# Patient Record
Sex: FEMALE | Race: WHITE | Employment: OTHER | URBAN - METROPOLITAN AREA
[De-identification: names, ages, dates, MRNs, and addresses within clinical notes are randomized per-mention and may not be internally consistent; named-entity substitution may affect disease eponyms.]

---

## 2018-07-09 ENCOUNTER — HOSPITAL ENCOUNTER (EMERGENCY)
Age: 33
Discharge: HOME | End: 2018-07-09
Attending: EMERGENCY MEDICINE
Payer: COMMERCIAL

## 2018-07-09 VITALS
RESPIRATION RATE: 16 BRPM | SYSTOLIC BLOOD PRESSURE: 121 MMHG | OXYGEN SATURATION: 100 % | TEMPERATURE: 98.7 F | DIASTOLIC BLOOD PRESSURE: 76 MMHG | HEART RATE: 74 BPM

## 2018-07-09 DIAGNOSIS — F41.9 ANXIETY: Primary | ICD-10-CM

## 2018-07-09 PROCEDURE — 93005 ELECTROCARDIOGRAM TRACING: CPT

## 2018-07-09 PROCEDURE — 74011250637 HC RX REV CODE- 250/637: Performed by: EMERGENCY MEDICINE

## 2018-07-09 PROCEDURE — 99283 EMERGENCY DEPT VISIT LOW MDM: CPT

## 2018-07-09 RX ORDER — QUETIAPINE FUMARATE 300 MG/1
600 TABLET, FILM COATED ORAL
COMMUNITY
End: 2018-09-28 | Stop reason: SDUPTHER

## 2018-07-09 RX ORDER — DIAZEPAM 5 MG/1
10 TABLET ORAL
Status: COMPLETED | OUTPATIENT
Start: 2018-07-09 | End: 2018-07-09

## 2018-07-09 RX ORDER — MIRTAZAPINE 45 MG/1
45 TABLET, FILM COATED ORAL
COMMUNITY
End: 2018-09-03

## 2018-07-09 RX ORDER — SERTRALINE HYDROCHLORIDE 100 MG/1
200 TABLET, FILM COATED ORAL DAILY
Qty: 20 TAB | Refills: 0 | Status: SHIPPED | OUTPATIENT
Start: 2018-07-09 | End: 2018-07-19

## 2018-07-09 RX ORDER — ATENOLOL 25 MG/1
25 TABLET ORAL DAILY
COMMUNITY
End: 2018-09-03

## 2018-07-09 RX ORDER — SERTRALINE HYDROCHLORIDE 100 MG/1
100 TABLET, FILM COATED ORAL DAILY
COMMUNITY
End: 2018-07-09

## 2018-07-09 RX ADMIN — DIAZEPAM 10 MG: 5 TABLET ORAL at 18:34

## 2018-07-09 NOTE — ED TRIAGE NOTES
Situational anxiety   Moved here from [facility] 6 weeks ago. Sober for 9 months from meth. Coming up on mother's death.     Needs some anxiety medication

## 2018-07-09 NOTE — DISCHARGE INSTRUCTIONS
Anxiety Disorder: Care Instructions  Your Care Instructions    Anxiety is a normal reaction to stress. Difficult situations can cause you to have symptoms such as sweaty palms and a nervous feeling. In an anxiety disorder, the symptoms are far more severe. Constant worry, muscle tension, trouble sleeping, nausea and diarrhea, and other symptoms can make normal daily activities difficult or impossible. These symptoms may occur for no reason, and they can affect your work, school, or social life. Medicines, counseling, and self-care can all help. Follow-up care is a key part of your treatment and safety. Be sure to make and go to all appointments, and call your doctor if you are having problems. It's also a good idea to know your test results and keep a list of the medicines you take. How can you care for yourself at home? · Take medicines exactly as directed. Call your doctor if you think you are having a problem with your medicine. · Go to your counseling sessions and follow-up appointments. · Recognize and accept your anxiety. Then, when you are in a situation that makes you anxious, say to yourself, \"This is not an emergency. I feel uncomfortable, but I am not in danger. I can keep going even if I feel anxious. \"  · Be kind to your body:  ¨ Relieve tension with exercise or a massage. ¨ Get enough rest.  ¨ Avoid alcohol, caffeine, nicotine, and illegal drugs. They can increase your anxiety level and cause sleep problems. ¨ Learn and do relaxation techniques. See below for more about these techniques. · Engage your mind. Get out and do something you enjoy. Go to a funny movie, or take a walk or hike. Plan your day. Having too much or too little to do can make you anxious. · Keep a record of your symptoms. Discuss your fears with a good friend or family member, or join a support group for people with similar problems. Talking to others sometimes relieves stress.   · Get involved in social groups, or volunteer to help others. Being alone sometimes makes things seem worse than they are. · Get at least 30 minutes of exercise on most days of the week to relieve stress. Walking is a good choice. You also may want to do other activities, such as running, swimming, cycling, or playing tennis or team sports. Relaxation techniques  Do relaxation exercises 10 to 20 minutes a day. You can play soothing, relaxing music while you do them, if you wish. · Tell others in your house that you are going to do your relaxation exercises. Ask them not to disturb you. · Find a comfortable place, away from all distractions and noise. · Lie down on your back, or sit with your back straight. · Focus on your breathing. Make it slow and steady. · Breathe in through your nose. Breathe out through either your nose or mouth. · Breathe deeply, filling up the area between your navel and your rib cage. Breathe so that your belly goes up and down. · Do not hold your breath. · Breathe like this for 5 to 10 minutes. Notice the feeling of calmness throughout your whole body. As you continue to breathe slowly and deeply, relax by doing the following for another 5 to 10 minutes:  · Tighten and relax each muscle group in your body. You can begin at your toes and work your way up to your head. · Imagine your muscle groups relaxing and becoming heavy. · Empty your mind of all thoughts. · Let yourself relax more and more deeply. · Become aware of the state of calmness that surrounds you. · When your relaxation time is over, you can bring yourself back to alertness by moving your fingers and toes and then your hands and feet and then stretching and moving your entire body. Sometimes people fall asleep during relaxation, but they usually wake up shortly afterward. · Always give yourself time to return to full alertness before you drive a car or do anything that might cause an accident if you are not fully alert.  Never play a relaxation tape while you drive a car. When should you call for help? Call 911 anytime you think you may need emergency care. For example, call if:  ? · You feel you cannot stop from hurting yourself or someone else. ? Keep the numbers for these national suicide hotlines: 0-703-971-TALK (3-100.942.2728) and 5-053-XNMBJGJ (1-994.528.6388). If you or someone you know talks about suicide or feeling hopeless, get help right away. ? Watch closely for changes in your health, and be sure to contact your doctor if:  ? · You have anxiety or fear that affects your life. ? · You have symptoms of anxiety that are new or different from those you had before. Where can you learn more? Go to http://ascencion-kenzie.info/. Enter P754 in the search box to learn more about \"Anxiety Disorder: Care Instructions. \"  Current as of: May 12, 2017  Content Version: 11.4  © 6075-6637 [employer]. Care instructions adapted under license by [employer] (which disclaims liability or warranty for this information). If you have questions about a medical condition or this instruction, always ask your healthcare professional. [address] any warranty or liability for your use of this information.

## 2018-07-09 NOTE — ED PROVIDER NOTES
EMERGENCY DEPARTMENT HISTORY AND PHYSICAL EXAM    Date: 7/9/2018  Patient Name: Malik Willis    History of Presenting Illness     Chief Complaint   Patient presents with    Anxiety         History Provided By: Patient    Chief Complaint: anxiety  Duration: 2 Days  Timing:  Acute  Location: NA  Quality: anxousness  Severity: Moderate  Modifying Factors: h/o schizophrenia, bipolar and PTSD; mother's death's anniversary this month  Associated Symptoms: denies any other associated signs or symptoms      Additional History (Context): Malik Willis is a 27 y.o. female with anxiety, schizophrenia, BIPOLAR, meth addiction who presents with anxiousness with upcoming mother's death's anniversary. Says she moved here from [place] 6 weeks ago and has filed paperwork with [employer] today to get coverage. On seroquel, zoloft, metoprolol. Says she's been narcotic and benzo free x 3yrs. Prior hysterectomy. Moved here b/c of  and his family. PCP: None    Current Facility-Administered Medications   Medication Dose Route Frequency Provider Last Rate Last Dose    diazePAM (VALIUM) tablet 10 mg  10 mg Oral NOW DIMAS Gallegos         Current Outpatient Prescriptions   Medication Sig Dispense Refill    QUEtiapine (SEROQUEL) 300 mg tablet Take 600 mg by mouth nightly.  atenolol (TENORMIN) 25 mg tablet Take 25 mg by mouth daily.  mirtazapine (REMERON) 45 mg tablet Take 45 mg by mouth nightly.  sertraline (ZOLOFT) 100 mg tablet Take 2 Tabs by mouth daily for 10 days. Indications: Post Traumatic Stress Disorder 20 Tab 0       Past History     Past Medical History:  Past Medical History:   Diagnosis Date    Hep C w/o coma, chronic (HCC)     Schizo[facility]affective psychosis ([facility] [address]     Tachycardia        Past Surgical History:  No past surgical history on file. Family History:  No family history on file.     Social History:  Social History   Substance Use Topics    Smoking status: Not on file    Smokeless tobacco: Not on file    Alcohol use Not on file       Allergies: Allergies   Allergen Reactions    Gabapentin Anaphylaxis    Amoxicillin Hives    Vistaril [Hydroxyzine Pamoate] Unknown (comments)         Review of Systems   Review of Systems   Constitutional: Negative for fever. Psychiatric/Behavioral: Negative for agitation, behavioral problems, confusion, decreased concentration, dysphoric mood, hallucinations, self-injury, sleep disturbance and suicidal ideas. The patient is nervous/anxious. The patient is not hyperactive. All Other Systems Negative  Physical Exam     Vitals:    07/09/18 1743   BP: 121/76   Pulse: 74   Resp: 16   Temp: 98.7 °F (37.1 °C)   SpO2: 100%     Physical Exam   Constitutional: She is oriented to person, place, and time. She appears well-developed. HENT:   Head: Normocephalic and atraumatic. Eyes: Pupils are equal, round, and reactive to light. Neck: No JVD present. No tracheal deviation present. No thyromegaly present. Cardiovascular: Normal rate, regular rhythm and normal heart sounds. Exam reveals no gallop and no friction rub. No murmur heard. Pulmonary/Chest: Effort normal and breath sounds normal. No stridor. No respiratory distress. She has no wheezes. She has no rales. She exhibits no tenderness. Abdominal: Soft. She exhibits no distension and no mass. There is no tenderness. There is no rebound and no guarding. Musculoskeletal: She exhibits no edema or tenderness. Lymphadenopathy:     She has no cervical adenopathy. Neurological: She is alert and oriented to person, place, and time. Skin: Skin is warm and dry. No rash noted. No erythema. No pallor. Psychiatric: She has a normal mood and affect. Her behavior is normal. Thought content normal.   Anxious affect   Nursing note and vitals reviewed.              Diagnostic Study Results     Labs -     Recent Results (from the past 12 hour(s))   EKG, 12 LEAD, INITIAL    Collection Time: 07/09/18  5:28 PM   Result Value Ref Range    Ventricular Rate 91 BPM    Atrial Rate 91 BPM    P-R Interval 142 ms    QRS Duration 76 ms    Q-T Interval 350 ms    QTC Calculation (Bezet) 430 ms    Calculated P Axis 67 degrees    Calculated R Axis 69 degrees    Calculated T Axis 9 degrees    Diagnosis       Normal sinus rhythm  ST abnormality, possible digitalis effect  Abnormal ECG  No previous ECGs available         Radiologic Studies -   No orders to display     CT Results  (Last 48 hours)    None        CXR Results  (Last 48 hours)    None            Medical Decision Making   I am the first provider for this patient. I reviewed the vital signs, available nursing notes, past medical history, past surgical history, family history and social history. Vital Signs-Reviewed the patient's vital signs. Records Reviewed: Nursing Notes and     Procedures:  Procedures    Provider Notes (Medical Decision Making):  performed; pt does have two Rx from [place] this year for narcotics, <25tabs. Give dose of valium PO once here then increase her Zoloft to 200mg daily to help w/anxiety. F/up with psych/addiciton specialist and . Not SI or HI. MED RECONCILIATION:  Current Facility-Administered Medications   Medication Dose Route Frequency    diazePAM (VALIUM) tablet 10 mg  10 mg Oral NOW     Current Outpatient Prescriptions   Medication Sig    QUEtiapine (SEROQUEL) 300 mg tablet Take 600 mg by mouth nightly.  atenolol (TENORMIN) 25 mg tablet Take 25 mg by mouth daily.  mirtazapine (REMERON) 45 mg tablet Take 45 mg by mouth nightly.  sertraline (ZOLOFT) 100 mg tablet Take 2 Tabs by mouth daily for 10 days. Indications: Post Traumatic Stress Disorder       Disposition:  home    DISCHARGE NOTE:   6:28 PM    Pt has been reexamined. Patient has no new complaints, changes, or physical findings. Care plan outlined and precautions discussed. Results of exam were reviewed with the patient.  All medications were reviewed with the patient; will d/c home with zolof increase. All of pt's questions and concerns were addressed. Patient was instructed and agrees to follow up with psych, as well as to return to the ED upon further deterioration. Patient is ready to go home. Follow-up Information     Follow up With Details Comments [facility] Call in 1 day please discuss your follow up care arrangements [address] [facility] [address] [place] [id number]    Jhoan Calvo MD Schedule an appointment as soon as possible for a visit in 1 day  [facility] [address] EMERGENCY DEPT  If symptoms worsen return immediately [address]  [phone]          Current Discharge Medication List      CONTINUE these medications which have CHANGED    Details   sertraline (ZOLOFT) 100 mg tablet Take 2 Tabs by mouth daily for 10 days. Indications: Post Traumatic Stress Disorder  Qty: 20 Tab, Refills: 0    Associated Diagnoses: Anxiety              Diagnosis     Clinical Impression:   1.  Anxiety

## 2018-07-10 LAB
ATRIAL RATE: 91 BPM
CALCULATED P AXIS, ECG09: 67 DEGREES
CALCULATED R AXIS, ECG10: 69 DEGREES
CALCULATED T AXIS, ECG11: 9 DEGREES
DIAGNOSIS, 93000: NORMAL
P-R INTERVAL, ECG05: 142 MS
Q-T INTERVAL, ECG07: 350 MS
QRS DURATION, ECG06: 76 MS
QTC CALCULATION (BEZET), ECG08: 430 MS
VENTRICULAR RATE, ECG03: 91 BPM

## 2018-07-12 ENCOUNTER — HOSPITAL ENCOUNTER (EMERGENCY)
Age: 33
Discharge: HOME | End: 2018-07-13
Attending: EMERGENCY MEDICINE
Payer: MEDICAID

## 2018-07-12 DIAGNOSIS — R51.9 NONINTRACTABLE HEADACHE, UNSPECIFIED CHRONICITY PATTERN, UNSPECIFIED HEADACHE TYPE: Primary | ICD-10-CM

## 2018-07-12 PROCEDURE — 85025 COMPLETE CBC W/AUTO DIFF WBC: CPT | Performed by: EMERGENCY MEDICINE

## 2018-07-12 PROCEDURE — 99284 EMERGENCY DEPT VISIT MOD MDM: CPT

## 2018-07-12 PROCEDURE — 80053 COMPREHEN METABOLIC PANEL: CPT | Performed by: EMERGENCY MEDICINE

## 2018-07-12 PROCEDURE — 84703 CHORIONIC GONADOTROPIN ASSAY: CPT | Performed by: EMERGENCY MEDICINE

## 2018-07-12 RX ORDER — METOCLOPRAMIDE HYDROCHLORIDE 5 MG/ML
10 INJECTION INTRAMUSCULAR; INTRAVENOUS
Status: COMPLETED | OUTPATIENT
Start: 2018-07-12 | End: 2018-07-13

## 2018-07-12 RX ORDER — DIPHENHYDRAMINE HYDROCHLORIDE 50 MG/ML
25 INJECTION, SOLUTION INTRAMUSCULAR; INTRAVENOUS
Status: COMPLETED | OUTPATIENT
Start: 2018-07-12 | End: 2018-07-13

## 2018-07-13 ENCOUNTER — APPOINTMENT (OUTPATIENT)
Dept: CT IMAGING | Age: 33
End: 2018-07-13
Attending: EMERGENCY MEDICINE
Payer: MEDICAID

## 2018-07-13 VITALS
RESPIRATION RATE: 16 BRPM | WEIGHT: 140 LBS | TEMPERATURE: 98.9 F | OXYGEN SATURATION: 94 % | BODY MASS INDEX: 25.76 KG/M2 | DIASTOLIC BLOOD PRESSURE: 60 MMHG | HEART RATE: 113 BPM | HEIGHT: 62 IN | SYSTOLIC BLOOD PRESSURE: 93 MMHG

## 2018-07-13 LAB
ALBUMIN SERPL-MCNC: 3.5 G/DL (ref 3.4–5)
ALBUMIN/GLOB SERPL: 1.2 {RATIO} (ref 0.8–1.7)
ALP SERPL-CCNC: 92 U/L (ref 45–117)
ALT SERPL-CCNC: 31 U/L (ref 13–56)
ANION GAP SERPL CALC-SCNC: 6 MMOL/L (ref 3–18)
AST SERPL-CCNC: 22 U/L (ref 15–37)
BASOPHILS # BLD: 0 K/UL (ref 0–0.1)
BASOPHILS NFR BLD: 0 % (ref 0–2)
BILIRUB SERPL-MCNC: 0.2 MG/DL (ref 0.2–1)
BUN SERPL-MCNC: 13 MG/DL (ref 7–18)
BUN/CREAT SERPL: 20 (ref 12–20)
CALCIUM SERPL-MCNC: 8 MG/DL (ref 8.5–10.1)
CHLORIDE SERPL-SCNC: 108 MMOL/L (ref 100–108)
CO2 SERPL-SCNC: 26 MMOL/L (ref 21–32)
CREAT SERPL-MCNC: 0.65 MG/DL (ref 0.6–1.3)
DIFFERENTIAL METHOD BLD: ABNORMAL
EOSINOPHIL # BLD: 0.2 K/UL (ref 0–0.4)
EOSINOPHIL NFR BLD: 3 % (ref 0–5)
ERYTHROCYTE [DISTWIDTH] IN BLOOD BY AUTOMATED COUNT: 12.1 % (ref 11.6–14.5)
GLOBULIN SER CALC-MCNC: 3 G/DL (ref 2–4)
GLUCOSE SERPL-MCNC: 106 MG/DL (ref 74–99)
HCG SERPL QL: NEGATIVE
HCT VFR BLD AUTO: 36.8 % (ref 35–45)
HGB BLD-MCNC: 13.3 G/DL (ref 12–16)
LYMPHOCYTES # BLD: 3.7 K/UL (ref 0.9–3.6)
LYMPHOCYTES NFR BLD: 40 % (ref 21–52)
MCH RBC QN AUTO: 33.1 PG (ref 24–34)
MCHC RBC AUTO-ENTMCNC: 36.1 G/DL (ref 31–37)
MCV RBC AUTO: 91.5 FL (ref 74–97)
MONOCYTES # BLD: 0.8 K/UL (ref 0.05–1.2)
MONOCYTES NFR BLD: 9 % (ref 3–10)
NEUTS SEG # BLD: 4.4 K/UL (ref 1.8–8)
NEUTS SEG NFR BLD: 48 % (ref 40–73)
PLATELET # BLD AUTO: 289 K/UL (ref 135–420)
PMV BLD AUTO: 8.8 FL (ref 9.2–11.8)
POTASSIUM SERPL-SCNC: 3.3 MMOL/L (ref 3.5–5.5)
PROT SERPL-MCNC: 6.5 G/DL (ref 6.4–8.2)
RBC # BLD AUTO: 4.02 M/UL (ref 4.2–5.3)
SODIUM SERPL-SCNC: 140 MMOL/L (ref 136–145)
WBC # BLD AUTO: 9.2 K/UL (ref 4.6–13.2)

## 2018-07-13 PROCEDURE — 96375 TX/PRO/DX INJ NEW DRUG ADDON: CPT

## 2018-07-13 PROCEDURE — 96374 THER/PROPH/DIAG INJ IV PUSH: CPT

## 2018-07-13 PROCEDURE — 70450 CT HEAD/BRAIN W/O DYE: CPT

## 2018-07-13 PROCEDURE — 74011250637 HC RX REV CODE- 250/637: Performed by: EMERGENCY MEDICINE

## 2018-07-13 PROCEDURE — 96361 HYDRATE IV INFUSION ADD-ON: CPT

## 2018-07-13 PROCEDURE — 74011250636 HC RX REV CODE- 250/636: Performed by: EMERGENCY MEDICINE

## 2018-07-13 RX ORDER — ACETAMINOPHEN 500 MG
1000 TABLET ORAL
Qty: 50 TAB | Refills: 0 | Status: SHIPPED | OUTPATIENT
Start: 2018-07-13 | End: 2018-08-06

## 2018-07-13 RX ORDER — ONDANSETRON 4 MG/1
4 TABLET, ORALLY DISINTEGRATING ORAL
Qty: 12 TAB | Refills: 0 | Status: SHIPPED | OUTPATIENT
Start: 2018-07-13 | End: 2018-08-06

## 2018-07-13 RX ORDER — IBUPROFEN 600 MG/1
600 TABLET ORAL
Qty: 20 TAB | Refills: 0 | Status: SHIPPED | OUTPATIENT
Start: 2018-07-13 | End: 2018-08-06

## 2018-07-13 RX ORDER — POTASSIUM CHLORIDE 20 MEQ/1
20 TABLET, EXTENDED RELEASE ORAL
Status: COMPLETED | OUTPATIENT
Start: 2018-07-13 | End: 2018-07-13

## 2018-07-13 RX ADMIN — SODIUM CHLORIDE 1000 ML: 900 INJECTION, SOLUTION INTRAVENOUS at 00:24

## 2018-07-13 RX ADMIN — METOCLOPRAMIDE 10 MG: 5 INJECTION, SOLUTION INTRAMUSCULAR; INTRAVENOUS at 00:27

## 2018-07-13 RX ADMIN — DIPHENHYDRAMINE HYDROCHLORIDE 25 MG: 50 INJECTION, SOLUTION INTRAMUSCULAR; INTRAVENOUS at 00:25

## 2018-07-13 RX ADMIN — POTASSIUM CHLORIDE 20 MEQ: 1500 TABLET, EXTENDED RELEASE ORAL at 00:49

## 2018-07-13 NOTE — ED NOTES
Pt was laying on left side asleep. When pt woken to take medications patient stated that her pain was 9/9. Pt has low BP but is asymptomatic.

## 2018-07-13 NOTE — ED PROVIDER NOTES
HPI Comments: Ayan Nogueira is a 27 y.o. Female who states she has h/o chiari for several years with c/o increased posterior headaches, intermittent tingling in hands, dropping things, nv for last several days to weeks. Seems to be worse in last few days. No fever, syncope, diff swallowing. Increased pressure to back of head. Taking tylenol with min relief. Blurred vision with no diplopia    The history is provided by the patient. Past Medical History:   Diagnosis Date    Hep C w/o coma, chronic (HCC)     Schizo-affective psychosis (HCC)     Tachycardia        History reviewed. No pertinent surgical history. History reviewed. No pertinent family history. Social History     Social History    Marital status: LEGALLY      Spouse name: N/A    Number of children: N/A    Years of education: N/A     Occupational History    Not on file. Social History Main Topics    Smoking status: Former Smoker    Smokeless tobacco: Never Used    Alcohol use No    Drug use: No    Sexual activity: Not on file     Other Topics Concern    Not on file     Social History Narrative         ALLERGIES: Gabapentin; Sulfa (sulfonamide antibiotics); Amoxicillin; Toradol [ketorolac]; and Vistaril [hydroxyzine pamoate]    Review of Systems   Constitutional: Negative for fever. HENT: Negative for facial swelling, sore throat and trouble swallowing. Eyes: Positive for photophobia and visual disturbance. Respiratory: Negative for cough and shortness of breath. Cardiovascular: Negative for chest pain. Gastrointestinal: Negative for abdominal pain. Endocrine: Negative for polyuria. Genitourinary: Negative for difficulty urinating and dysuria. No incontinence, saddle anesthesia, bowel issues     Musculoskeletal: Negative for gait problem. Skin: Negative for rash. Allergic/Immunologic: Negative for immunocompromised state. Neurological: Positive for dizziness, light-headedness and headaches. Negative for syncope. Psychiatric/Behavioral: Negative for sleep disturbance. Vitals:    07/13/18 0017 07/13/18 0020 07/13/18 0030 07/13/18 0115   BP: 106/64  96/46 93/47   Pulse:       Resp:       Temp:       SpO2:  94% 93% 92%   Weight:       Height:                Physical Exam   Constitutional: She is oriented to person, place, and time. She appears well-developed and well-nourished. No distress. HENT:   Head: Normocephalic and atraumatic. Right Ear: External ear normal.   Left Ear: External ear normal.   Nose: Nose normal.   Mouth/Throat: Uvula is midline, oropharynx is clear and moist and mucous membranes are normal.   Eyes: Conjunctivae are normal. No scleral icterus. Neck: Neck supple. Cardiovascular: Regular rhythm, normal heart sounds and intact distal pulses. Tachycardia present. Pulmonary/Chest: Effort normal and breath sounds normal.   Abdominal: Soft. There is no tenderness. Musculoskeletal: She exhibits no edema. Neurological: She is alert and oriented to person, place, and time. She displays no atrophy and no tremor. No cranial nerve deficit (3-12) or sensory deficit. She exhibits normal muscle tone. She displays no seizure activity. Coordination and gait normal. GCS eye subscore is 4. GCS verbal subscore is 5. GCS motor subscore is 6. Skin: Skin is warm and dry. She is not diaphoretic. Psychiatric: Her behavior is normal.   Nursing note and vitals reviewed.        [facility]      ED Course       Procedures      Vitals:  Patient Vitals for the past 12 hrs:   Temp Pulse Resp BP SpO2   07/13/18 0115 - - - 93/47 92 %   07/13/18 0030 - - - 96/46 93 %   07/13/18 0020 - - - - 94 %   07/13/18 0017 - - - 106/64 -   07/12/18 2345 - - - 91/52 93 %   07/12/18 2330 - - - 102/62 94 %   07/12/18 2256 98.9 °F (37.2 °C) (!) 113 16 132/78 97 %         Medications ordered:   Medications   sodium chloride 0.9 % bolus infusion 1,000 mL (1,000 mL IntraVENous New Bag 7/13/18 0024)   metoclopramide HCl [facility]) injection 10 mg (10 mg IntraVENous Given 7/13/18 0027)   diphenhydrAMINE (BENADRYL) injection 25 mg (25 mg IntraVENous Given 7/13/18 0025)   potassium chloride (K-DUR, KLOR-CON) SR tablet 20 mEq (20 mEq Oral Given 7/13/18 0049)         Lab findings:  Recent Results (from the past 12 hour(s))   CBC WITH AUTOMATED DIFF    Collection Time: 07/12/18 11:23 PM   Result Value Ref Range    WBC 9.2 4.6 - 13.2 K/uL    RBC 4.02 (L) 4.20 - 5.30 M/uL    HGB 13.3 12.0 - 16.0 g/dL    HCT 36.8 35.0 - 45.0 %    MCV 91.5 74.0 - 97.0 FL    MCH 33.1 24.0 - 34.0 PG    MCHC 36.1 31.0 - 37.0 g/dL    RDW 12.1 11.6 - 14.5 %    PLATELET 842 018 - 780 K/uL    MPV 8.8 (L) 9.2 - 11.8 FL    NEUTROPHILS 48 40 - 73 %    LYMPHOCYTES 40 21 - 52 %    MONOCYTES 9 3 - 10 %    EOSINOPHILS 3 0 - 5 %    BASOPHILS 0 0 - 2 %    ABS. NEUTROPHILS 4.4 1.8 - 8.0 K/UL    ABS. LYMPHOCYTES 3.7 (H) 0.9 - 3.6 K/UL    ABS. MONOCYTES 0.8 0.05 - 1.2 K/UL    ABS. EOSINOPHILS 0.2 0.0 - 0.4 K/UL    ABS. BASOPHILS 0.0 0.0 - 0.1 K/UL    DF AUTOMATED     METABOLIC PANEL, COMPREHENSIVE    Collection Time: 07/12/18 11:23 PM   Result Value Ref Range    Sodium 140 136 - 145 mmol/L    Potassium 3.3 (L) 3.5 - 5.5 mmol/L    Chloride 108 100 - 108 mmol/L    CO2 26 21 - 32 mmol/L    Anion gap 6 3.0 - 18 mmol/L    Glucose 106 (H) 74 - 99 mg/dL    BUN 13 7.0 - 18 MG/DL    Creatinine 0.65 0.6 - 1.3 MG/DL    BUN/Creatinine ratio 20 12 - 20      GFR est AA >60 >60 ml/min/1.73m2    GFR est non-AA >60 >60 ml/min/1.73m2    Calcium 8.0 (L) 8.5 - 10.1 MG/DL    Bilirubin, total 0.2 0.2 - 1.0 MG/DL    ALT (SGPT) 31 13 - 56 U/L    AST (SGOT) 22 15 - 37 U/L    Alk.  phosphatase 92 45 - 117 U/L    Protein, total 6.5 6.4 - 8.2 g/dL    Albumin 3.5 3.4 - 5.0 g/dL    Globulin 3.0 2.0 - 4.0 g/dL    A-G Ratio 1.2 0.8 - 1.7     HCG QL SERUM    Collection Time: 07/12/18 11:23 PM   Result Value Ref Range    HCG, Ql. NEGATIVE  NEG         EKG interpretation by ED Physician:      X-Ray, CT or other radiology findings or impressions:  CT HEAD WO CONT    (Results Pending)   nothing acute per prelim report      Progress notes, Consult notes or additional Procedure notes:   Doubt need for emergent mri, consult  Can f/u as pt  I have discussed with patient and/or family/sig other the results, interpretation of any imaging if performed, suspected diagnosis and treatment plan to include instructions regarding the diagnoses listed to which understanding was expressed with all questions answered      Reevaluation of patient:   stable    Disposition:  Diagnosis:   1. Nonintractable headache, unspecified chronicity pattern, unspecified headache type        Disposition: home    Follow-up Information     Follow up With Details Comments Contact Info    [facility] Schedule an appointment as soon as possible for a visit  [address] [place] [address] [phone]            Patient's Medications   Start Taking    ACETAMINOPHEN (TYLENOL EXTRA STRENGTH) 500 MG TABLET    Take 2 Tabs by mouth every six (6) hours as needed for Pain. IBUPROFEN (MOTRIN) 600 MG TABLET    Take 1 Tab by mouth every six (6) hours as needed for Pain. ONDANSETRON (ZOFRAN ODT) 4 MG DISINTEGRATING TABLET    Take 1 Tab by mouth every eight (8) hours as needed for Nausea. Continue Taking    ATENOLOL (TENORMIN) 25 MG TABLET    Take 25 mg by mouth daily. MIRTAZAPINE (REMERON) 45 MG TABLET    Take 45 mg by mouth nightly. QUETIAPINE (SEROQUEL) 300 MG TABLET    Take 600 mg by mouth nightly. SERTRALINE (ZOLOFT) 100 MG TABLET    Take 2 Tabs by mouth daily for 10 days.  Indications: Post Traumatic Stress Disorder   These Medications have changed    No medications on file   Stop Taking    No medications on file

## 2018-08-06 ENCOUNTER — APPOINTMENT (OUTPATIENT)
Dept: CT IMAGING | Age: 33
End: 2018-08-06
Attending: EMERGENCY MEDICINE
Payer: MEDICAID

## 2018-08-06 ENCOUNTER — HOSPITAL ENCOUNTER (EMERGENCY)
Age: 33
Discharge: HOME | End: 2018-08-06
Attending: EMERGENCY MEDICINE
Payer: MEDICAID

## 2018-08-06 ENCOUNTER — APPOINTMENT (OUTPATIENT)
Dept: GENERAL RADIOLOGY | Age: 33
End: 2018-08-06
Attending: EMERGENCY MEDICINE
Payer: MEDICAID

## 2018-08-06 VITALS
BODY MASS INDEX: 25.61 KG/M2 | SYSTOLIC BLOOD PRESSURE: 134 MMHG | TEMPERATURE: 99.9 F | RESPIRATION RATE: 20 BRPM | DIASTOLIC BLOOD PRESSURE: 75 MMHG | HEART RATE: 106 BPM | WEIGHT: 140 LBS | OXYGEN SATURATION: 98 %

## 2018-08-06 DIAGNOSIS — R10.84 ABDOMINAL PAIN, GENERALIZED: Primary | ICD-10-CM

## 2018-08-06 DIAGNOSIS — R10.9 BILATERAL FLANK PAIN: ICD-10-CM

## 2018-08-06 DIAGNOSIS — R11.2 INTRACTABLE VOMITING WITH NAUSEA, UNSPECIFIED VOMITING TYPE: ICD-10-CM

## 2018-08-06 LAB
ALBUMIN SERPL-MCNC: 3.9 G/DL (ref 3.4–5)
ALBUMIN/GLOB SERPL: 1.2 {RATIO} (ref 0.8–1.7)
ALP SERPL-CCNC: 88 U/L (ref 45–117)
ALT SERPL-CCNC: 20 U/L (ref 13–56)
AMPHET UR QL SCN: NEGATIVE
ANION GAP SERPL CALC-SCNC: 9 MMOL/L (ref 3–18)
APPEARANCE UR: CLEAR
AST SERPL-CCNC: 14 U/L (ref 15–37)
BACTERIA URNS QL MICRO: NEGATIVE /HPF
BARBITURATES UR QL SCN: NEGATIVE
BASOPHILS # BLD: 0 K/UL (ref 0–0.1)
BASOPHILS NFR BLD: 0 % (ref 0–2)
BENZODIAZ UR QL: NEGATIVE
BILIRUB DIRECT SERPL-MCNC: <0.1 MG/DL (ref 0–0.2)
BILIRUB SERPL-MCNC: 0.4 MG/DL (ref 0.2–1)
BILIRUB UR QL: NEGATIVE
BUN SERPL-MCNC: 5 MG/DL (ref 7–18)
BUN/CREAT SERPL: 8 (ref 12–20)
CALCIUM SERPL-MCNC: 8.7 MG/DL (ref 8.5–10.1)
CANNABINOIDS UR QL SCN: POSITIVE
CHLORIDE SERPL-SCNC: 110 MMOL/L (ref 100–108)
CO2 SERPL-SCNC: 23 MMOL/L (ref 21–32)
COCAINE UR QL SCN: NEGATIVE
COLOR UR: YELLOW
CREAT SERPL-MCNC: 0.65 MG/DL (ref 0.6–1.3)
DIFFERENTIAL METHOD BLD: ABNORMAL
EOSINOPHIL # BLD: 0.4 K/UL (ref 0–0.4)
EOSINOPHIL NFR BLD: 3 % (ref 0–5)
EPITH CASTS URNS QL MICRO: NORMAL /LPF (ref 0–5)
ERYTHROCYTE [DISTWIDTH] IN BLOOD BY AUTOMATED COUNT: 13 % (ref 11.6–14.5)
GLOBULIN SER CALC-MCNC: 3.3 G/DL (ref 2–4)
GLUCOSE SERPL-MCNC: 117 MG/DL (ref 74–99)
GLUCOSE UR STRIP.AUTO-MCNC: NEGATIVE MG/DL
HCG UR QL: NEGATIVE
HCT VFR BLD AUTO: 35.9 % (ref 35–45)
HDSCOM,HDSCOM: ABNORMAL
HGB BLD-MCNC: 12.7 G/DL (ref 12–16)
HGB UR QL STRIP: NEGATIVE
KETONES UR QL STRIP.AUTO: NEGATIVE MG/DL
LACTATE BLD-SCNC: 1.5 MMOL/L (ref 0.4–2)
LEUKOCYTE ESTERASE UR QL STRIP.AUTO: ABNORMAL
LIPASE SERPL-CCNC: 121 U/L (ref 73–393)
LYMPHOCYTES # BLD: 3.5 K/UL (ref 0.9–3.6)
LYMPHOCYTES NFR BLD: 30 % (ref 21–52)
MCH RBC QN AUTO: 33.1 PG (ref 24–34)
MCHC RBC AUTO-ENTMCNC: 35.4 G/DL (ref 31–37)
MCV RBC AUTO: 93.5 FL (ref 74–97)
METHADONE UR QL: NEGATIVE
MONOCYTES # BLD: 0.9 K/UL (ref 0.05–1.2)
MONOCYTES NFR BLD: 7 % (ref 3–10)
NEUTS SEG # BLD: 6.9 K/UL (ref 1.8–8)
NEUTS SEG NFR BLD: 60 % (ref 40–73)
NITRITE UR QL STRIP.AUTO: NEGATIVE
OPIATES UR QL: NEGATIVE
PCP UR QL: NEGATIVE
PH UR STRIP: 5 [PH] (ref 5–8)
PLATELET # BLD AUTO: 269 K/UL (ref 135–420)
PMV BLD AUTO: 9.2 FL (ref 9.2–11.8)
POTASSIUM SERPL-SCNC: 3.4 MMOL/L (ref 3.5–5.5)
PROT SERPL-MCNC: 7.2 G/DL (ref 6.4–8.2)
PROT UR STRIP-MCNC: NEGATIVE MG/DL
RBC # BLD AUTO: 3.84 M/UL (ref 4.2–5.3)
RBC #/AREA URNS HPF: 0 /HPF (ref 0–5)
SODIUM SERPL-SCNC: 142 MMOL/L (ref 136–145)
SP GR UR REFRACTOMETRY: 1.01 (ref 1–1.03)
UROBILINOGEN UR QL STRIP.AUTO: 0.2 EU/DL (ref 0.2–1)
WBC # BLD AUTO: 11.7 K/UL (ref 4.6–13.2)
WBC URNS QL MICRO: NORMAL /HPF (ref 0–4)

## 2018-08-06 PROCEDURE — 80076 HEPATIC FUNCTION PANEL: CPT | Performed by: EMERGENCY MEDICINE

## 2018-08-06 PROCEDURE — 96374 THER/PROPH/DIAG INJ IV PUSH: CPT

## 2018-08-06 PROCEDURE — 96375 TX/PRO/DX INJ NEW DRUG ADDON: CPT

## 2018-08-06 PROCEDURE — 81025 URINE PREGNANCY TEST: CPT | Performed by: EMERGENCY MEDICINE

## 2018-08-06 PROCEDURE — 74011250636 HC RX REV CODE- 250/636: Performed by: EMERGENCY MEDICINE

## 2018-08-06 PROCEDURE — 93005 ELECTROCARDIOGRAM TRACING: CPT

## 2018-08-06 PROCEDURE — 87040 BLOOD CULTURE FOR BACTERIA: CPT | Performed by: EMERGENCY MEDICINE

## 2018-08-06 PROCEDURE — 83690 ASSAY OF LIPASE: CPT | Performed by: EMERGENCY MEDICINE

## 2018-08-06 PROCEDURE — 80048 BASIC METABOLIC PNL TOTAL CA: CPT | Performed by: EMERGENCY MEDICINE

## 2018-08-06 PROCEDURE — 74011250637 HC RX REV CODE- 250/637: Performed by: EMERGENCY MEDICINE

## 2018-08-06 PROCEDURE — 71045 X-RAY EXAM CHEST 1 VIEW: CPT

## 2018-08-06 PROCEDURE — 81001 URINALYSIS AUTO W/SCOPE: CPT | Performed by: EMERGENCY MEDICINE

## 2018-08-06 PROCEDURE — 74177 CT ABD & PELVIS W/CONTRAST: CPT

## 2018-08-06 PROCEDURE — 74011636320 HC RX REV CODE- 636/320: Performed by: EMERGENCY MEDICINE

## 2018-08-06 PROCEDURE — 85025 COMPLETE CBC W/AUTO DIFF WBC: CPT | Performed by: EMERGENCY MEDICINE

## 2018-08-06 PROCEDURE — 83605 ASSAY OF LACTIC ACID: CPT

## 2018-08-06 PROCEDURE — 99285 EMERGENCY DEPT VISIT HI MDM: CPT

## 2018-08-06 PROCEDURE — 80307 DRUG TEST PRSMV CHEM ANLYZR: CPT | Performed by: EMERGENCY MEDICINE

## 2018-08-06 RX ORDER — ONDANSETRON HYDROCHLORIDE 8 MG/1
8 TABLET, FILM COATED ORAL
Qty: 12 TAB | Refills: 0 | Status: SHIPPED | OUTPATIENT
Start: 2018-08-06 | End: 2018-09-28 | Stop reason: SDUPTHER

## 2018-08-06 RX ORDER — LEVOFLOXACIN 5 MG/ML
750 INJECTION, SOLUTION INTRAVENOUS EVERY 24 HOURS
Status: DISCONTINUED | OUTPATIENT
Start: 2018-08-06 | End: 2018-08-06

## 2018-08-06 RX ORDER — OXYCODONE AND ACETAMINOPHEN 5; 325 MG/1; MG/1
1 TABLET ORAL
Status: COMPLETED | OUTPATIENT
Start: 2018-08-06 | End: 2018-08-06

## 2018-08-06 RX ORDER — MORPHINE SULFATE 4 MG/ML
4 INJECTION INTRAVENOUS
Status: COMPLETED | OUTPATIENT
Start: 2018-08-06 | End: 2018-08-06

## 2018-08-06 RX ORDER — METRONIDAZOLE 500 MG/100ML
500 INJECTION, SOLUTION INTRAVENOUS EVERY 8 HOURS
Status: DISCONTINUED | OUTPATIENT
Start: 2018-08-06 | End: 2018-08-06

## 2018-08-06 RX ORDER — SERTRALINE HYDROCHLORIDE 100 MG/1
200 TABLET, FILM COATED ORAL DAILY
COMMUNITY
End: 2018-09-11 | Stop reason: SDUPTHER

## 2018-08-06 RX ORDER — ONDANSETRON 2 MG/ML
4 INJECTION INTRAMUSCULAR; INTRAVENOUS
Status: COMPLETED | OUTPATIENT
Start: 2018-08-06 | End: 2018-08-06

## 2018-08-06 RX ORDER — SODIUM CHLORIDE 0.9 % (FLUSH) 0.9 %
5-10 SYRINGE (ML) INJECTION AS NEEDED
Status: DISCONTINUED | OUTPATIENT
Start: 2018-08-06 | End: 2018-08-07 | Stop reason: HOSPADM

## 2018-08-06 RX ADMIN — ONDANSETRON 4 MG: 2 INJECTION, SOLUTION INTRAMUSCULAR; INTRAVENOUS at 21:39

## 2018-08-06 RX ADMIN — MORPHINE SULFATE 4 MG: 4 INJECTION INTRAVENOUS at 20:59

## 2018-08-06 RX ADMIN — IOPAMIDOL 92 ML: 612 INJECTION, SOLUTION INTRAVENOUS at 21:47

## 2018-08-06 RX ADMIN — OXYCODONE HYDROCHLORIDE AND ACETAMINOPHEN 1 TABLET: 5; 325 TABLET ORAL at 22:12

## 2018-08-07 LAB
ATRIAL RATE: 104 BPM
CALCULATED P AXIS, ECG09: 63 DEGREES
CALCULATED R AXIS, ECG10: 77 DEGREES
CALCULATED T AXIS, ECG11: 26 DEGREES
DIAGNOSIS, 93000: NORMAL
P-R INTERVAL, ECG05: 144 MS
Q-T INTERVAL, ECG07: 348 MS
QRS DURATION, ECG06: 78 MS
QTC CALCULATION (BEZET), ECG08: 457 MS
VENTRICULAR RATE, ECG03: 104 BPM

## 2018-08-07 NOTE — DISCHARGE INSTRUCTIONS

## 2018-08-07 NOTE — ED PROVIDER NOTES
EMERGENCY DEPARTMENT HISTORY AND PHYSICAL EXAM    Date: 8/6/2018  Patient Name: Freddie Godoy    History of Presenting Illness     Chief Complaint   Patient presents with    Vaginal Pain    Flank Pain    Diarrhea         History Provided By: Patient and Fiance    Chief Complaint: abd pain and distension  Duration: 3 Days  Timing:  Acute  Location: generalized distension, RUQ abd pain  Quality: Aching and Pressure  Severity: Severe  Modifying Factors: h/o hepatitis C  Associated Symptoms: subjective fever, chills      Additional History (Context): Freddie Godoy is a 27 y.o. female with methamphetamine abuse, hepatitis C who presents with abd pain, distension, subjective fever/chills x 3d. +nausea. Denies vomiting, but has had frequent loose non-bloody stools. Was approved for harvoni when she lived in [facility] but came to this area for her SO. Is waiting for [facility] approval of her insurance. Pain in her abd also extends to her flank. Feels there's pressure from the distension exerting itself on her pelvic floor. Prior abd surgeries include cholecystectomy and appendectomy. RML wedge resection. PCP: None    Current Facility-Administered Medications   Medication Dose Route Frequency Provider Last Rate Last Dose    sodium chloride (NS) flush 5-10 mL  5-10 mL IntraVENous PRN DIMAS Coronado         Current Outpatient Prescriptions   Medication Sig Dispense Refill    sertraline (ZOLOFT) 100 mg tablet Take 200 mg by mouth daily.  ondansetron hcl (ZOFRAN) 8 mg tablet Take 1 Tab by mouth every eight (8) hours as needed for Nausea for up to 12 doses. 12 Tab 0    QUEtiapine (SEROQUEL) 300 mg tablet Take 600 mg by mouth nightly.  atenolol (TENORMIN) 25 mg tablet Take 25 mg by mouth daily.  mirtazapine (REMERON) 45 mg tablet Take 45 mg by mouth nightly.          Past History     Past Medical History:  Past Medical History:   Diagnosis Date    Hep C w/o coma, chronic ([address]     [facility]ive psychosis [facility]     Tachycardia        Past Surgical History:  Past Surgical History:   Procedure Laterality Date    HX APPENDECTOMY      HX CHOLECYSTECTOMY      HX HYSTERECTOMY      HX OTHER SURGICAL      right middle lobe wedge resection       Family History:  History reviewed. No pertinent family history. Social History:  Social History   Substance Use Topics    Smoking status: Former Smoker     Packs/day: 1.00    Smokeless tobacco: Never Used    Alcohol use No       Allergies: Allergies   Allergen Reactions    Gabapentin Anaphylaxis    Sulfa (Sulfonamide Antibiotics) Anaphylaxis    Amoxicillin Hives    Toradol [Ketorolac] Hives    Vistaril [Hydroxyzine Pamoate] Unknown (comments)         Review of Systems   Review of Systems   Constitutional: Positive for chills. HENT: Negative. Eyes: Negative. Respiratory: Negative. Cardiovascular: Negative. Gastrointestinal: Positive for abdominal distention, abdominal pain, diarrhea and nausea. Negative for blood in stool and vomiting. Endocrine: Negative. Genitourinary: Negative. Musculoskeletal: Negative. Skin: Negative. Allergic/Immunologic: Negative. Neurological: Negative. Hematological: Negative. Psychiatric/Behavioral: Negative. All other systems reviewed and are negative. All Other Systems Negative  Physical Exam     Vitals:    08/06/18 1923 08/06/18 2059 08/06/18 2107   BP: 134/75     Pulse: (!) 106     Resp: 20     Temp: 100.1 °F (37.8 °C)  99.9 °F (37.7 °C)   SpO2: 98%     Weight:  63.5 kg (140 lb)      Physical Exam   Constitutional: She is oriented to person, place, and time. She appears well-developed. She appears distressed. HENT:   Head: Normocephalic and atraumatic. Eyes: Pupils are equal, round, and reactive to light. Neck: No JVD present. No tracheal deviation present. No thyromegaly present. Cardiovascular: Regular rhythm and normal heart sounds.   Exam reveals no gallop and no friction rub.    No murmur heard. Tachycardiac rate   Pulmonary/Chest: Effort normal and breath sounds normal. No stridor. No respiratory distress. She has no wheezes. She has no rales. She exhibits no tenderness. Abdominal: Soft. She exhibits distension. She exhibits no mass. There is tenderness. There is no rebound and no guarding. +RUQ TTP   Musculoskeletal: She exhibits no edema or tenderness. Lymphadenopathy:     She has no cervical adenopathy. Neurological: She is alert and oriented to person, place, and time. Skin: Skin is warm and dry. No rash noted. No erythema. No pallor. Psychiatric: She has a normal mood and affect. Her behavior is normal. Thought content normal.   Nursing note and vitals reviewed.              Diagnostic Study Results     Labs -     Recent Results (from the past 12 hour(s))   URINALYSIS W/ RFLX MICROSCOPIC    Collection Time: 08/06/18  7:27 PM   Result Value Ref Range    Color YELLOW      Appearance CLEAR      Specific gravity 1.013 1.005 - 1.030      pH (UA) 5.0 5.0 - 8.0      Protein NEGATIVE  NEG mg/dL    Glucose NEGATIVE  NEG mg/dL    Ketone NEGATIVE  NEG mg/dL    Bilirubin NEGATIVE  NEG      Blood NEGATIVE  NEG      Urobilinogen 0.2 0.2 - 1.0 EU/dL    Nitrites NEGATIVE  NEG      Leukocyte Esterase TRACE (A) NEG     URINE MICROSCOPIC ONLY    Collection Time: 08/06/18  7:27 PM   Result Value Ref Range    WBC 1 to 3 0 - 4 /hpf    RBC 0 0 - 5 /hpf    Epithelial cells FEW 0 - 5 /lpf    Bacteria NEGATIVE  NEG /hpf   DRUG SCREEN, URINE    Collection Time: 08/06/18  7:27 PM   Result Value Ref Range    BENZODIAZEPINES NEGATIVE  NEG      BARBITURATES NEGATIVE  NEG      THC (TH-CANNABINOL) POSITIVE (A) NEG      OPIATES NEGATIVE  NEG      PCP(PHENCYCLIDINE) NEGATIVE  NEG      COCAINE NEGATIVE  NEG      AMPHETAMINES NEGATIVE  NEG      METHADONE NEGATIVE  NEG      HDSCOM (NOTE)    HCG URINE, QL    Collection Time: 08/06/18  7:27 PM   Result Value Ref Range    HCG urine, QL NEGATIVE  NEG CBC WITH AUTOMATED DIFF    Collection Time: 08/06/18  8:32 PM   Result Value Ref Range    WBC 11.7 4.6 - 13.2 K/uL    RBC 3.84 (L) 4.20 - 5.30 M/uL    HGB 12.7 12.0 - 16.0 g/dL    HCT 35.9 35.0 - 45.0 %    MCV 93.5 74.0 - 97.0 FL    MCH 33.1 24.0 - 34.0 PG    MCHC 35.4 31.0 - 37.0 g/dL    RDW 13.0 11.6 - 14.5 %    PLATELET 212 517 - 985 K/uL    MPV 9.2 9.2 - 11.8 FL    NEUTROPHILS 60 40 - 73 %    LYMPHOCYTES 30 21 - 52 %    MONOCYTES 7 3 - 10 %    EOSINOPHILS 3 0 - 5 %    BASOPHILS 0 0 - 2 %    ABS. NEUTROPHILS 6.9 1.8 - 8.0 K/UL    ABS. LYMPHOCYTES 3.5 0.9 - 3.6 K/UL    ABS. MONOCYTES 0.9 0.05 - 1.2 K/UL    ABS. EOSINOPHILS 0.4 0.0 - 0.4 K/UL    ABS. BASOPHILS 0.0 0.0 - 0.1 K/UL    DF AUTOMATED     METABOLIC PANEL, BASIC    Collection Time: 08/06/18  8:32 PM   Result Value Ref Range    Sodium 142 136 - 145 mmol/L    Potassium 3.4 (L) 3.5 - 5.5 mmol/L    Chloride 110 (H) 100 - 108 mmol/L    CO2 23 21 - 32 mmol/L    Anion gap 9 3.0 - 18 mmol/L    Glucose 117 (H) 74 - 99 mg/dL    BUN 5 (L) 7.0 - 18 MG/DL    Creatinine 0.65 0.6 - 1.3 MG/DL    BUN/Creatinine ratio 8 (L) 12 - 20      GFR est AA >60 >60 ml/min/1.73m2    GFR est non-AA >60 >60 ml/min/1.73m2    Calcium 8.7 8.5 - 10.1 MG/DL   HEPATIC FUNCTION PANEL    Collection Time: 08/06/18  8:32 PM   Result Value Ref Range    Protein, total 7.2 6.4 - 8.2 g/dL    Albumin 3.9 3.4 - 5.0 g/dL    Globulin 3.3 2.0 - 4.0 g/dL    A-G Ratio 1.2 0.8 - 1.7      Bilirubin, total 0.4 0.2 - 1.0 MG/DL    Bilirubin, direct <0.1 0.0 - 0.2 MG/DL    Alk.  phosphatase 88 45 - 117 U/L    AST (SGOT) 14 (L) 15 - 37 U/L    ALT (SGPT) 20 13 - 56 U/L   LIPASE    Collection Time: 08/06/18  8:32 PM   Result Value Ref Range    Lipase 121 73 - 393 U/L   POC LACTIC ACID    Collection Time: 08/06/18  8:32 PM   Result Value Ref Range    Lactic Acid (POC) 1.5 0.4 - 2.0 mmol/L   EKG, 12 LEAD, INITIAL    Collection Time: 08/06/18  9:02 PM   Result Value Ref Range    Ventricular Rate 104 BPM    Atrial Rate 104 BPM    P-R Interval 144 ms    QRS Duration 78 ms    Q-T Interval 348 ms    QTC Calculation (Bezet) 457 ms    Calculated P Axis 63 degrees    Calculated R Axis 77 degrees    Calculated T Axis 26 degrees    Diagnosis       Sinus tachycardia  Possible Left atrial enlargement  Borderline ECG  When compared with ECG of 09-JUL-2018 17:28,  No significant change was found         Radiologic Studies -   XR CHEST PORT    (Results Pending)   CT ABD PELV W CONT    (Results Pending)     CT Results  (Last 48 hours)    None        CXR Results  (Last 48 hours)    None            Medical Decision Making   I am the first provider for this patient. I reviewed the vital signs, available nursing notes, past medical history, past surgical history, family history and social history. Vital Signs-Reviewed the patient's vital signs. Records Reviewed: Nursing Notes    Procedures:  EKG  Date/Time: 8/6/2018 9:42 PM  Performed by: Caroline Duarte by: Vanna Mckenna     Interpretation:     Interpretation: abnormal    Rate:     ECG rate:  106    ECG rate assessment: tachycardic    Rhythm:     Rhythm: sinus tachycardia    Ectopy:     Ectopy: none    QRS:     QRS axis:  Normal    QRS intervals:  Normal  Conduction:     Conduction: normal    ST segments:     ST segments:  Normal  T waves:     T waves: normal          Provider Notes (Medical Decision Making): CT scan shows no acute abnormalities in the abd or pelvis. Lung base ground glass opacities, atelectasis or edema. Less likely infectious. Scarring lung left base. Cholecystectomy, hysterectomy. CXR shows nothing acute. Labs stable. One dose of narcotic and feels well. Gh/o medical marijuana; should remain positive in urine x 90 d from lasat use. F/up with PCP referral.  Asks for Zofran Rx.     MED RECONCILIATION:  Current Facility-Administered Medications   Medication Dose Route Frequency    sodium chloride (NS) flush 5-10 mL  5-10 mL IntraVENous PRN Current Outpatient Prescriptions   Medication Sig    sertraline (ZOLOFT) 100 mg tablet Take 200 mg by mouth daily.  ondansetron hcl (ZOFRAN) 8 mg tablet Take 1 Tab by mouth every eight (8) hours as needed for Nausea for up to 12 doses.  QUEtiapine (SEROQUEL) 300 mg tablet Take 600 mg by mouth nightly.  atenolol (TENORMIN) 25 mg tablet Take 25 mg by mouth daily.  mirtazapine (REMERON) 45 mg tablet Take 45 mg by mouth nightly. Disposition:  home    DISCHARGE NOTE:   11:14 PM    Pt has been reexamined. Patient has no new complaints, changes, or physical findings. Care plan outlined and precautions discussed. Results of labs, CT, CXR were reviewed with the patient. All medications were reviewed with the patient; will d/c home with Zofran. All of pt's questions and concerns were addressed. Patient was instructed and agrees to follow up with PCP, as well as to return to the ED upon further deterioration. Patient is ready to go home. Follow-up Information     Follow up With Details Comments [address], [place] Schedule an appointment as soon as possible for a visit in 1 day  [address] [address] EMERGENCY DEPT  If symptoms worsen return immediately [address]  [phone]          Current Discharge Medication List      START taking these medications    Details   ondansetron hcl (ZOFRAN) 8 mg tablet Take 1 Tab by mouth every eight (8) hours as needed for Nausea for up to 12 doses. Qty: 12 Tab, Refills: 0                 Diagnosis     Clinical Impression:   1. Abdominal pain, generalized    2. Bilateral flank pain    3.  Intractable vomiting with nausea, unspecified vomiting type

## 2018-08-12 LAB
BACTERIA SPEC CULT: NORMAL
BACTERIA SPEC CULT: NORMAL
SERVICE CMNT-IMP: NORMAL
SERVICE CMNT-IMP: NORMAL

## 2018-09-03 ENCOUNTER — APPOINTMENT (OUTPATIENT)
Dept: GENERAL RADIOLOGY | Age: 33
End: 2018-09-03
Attending: PHYSICIAN ASSISTANT
Payer: MEDICAID

## 2018-09-03 ENCOUNTER — HOSPITAL ENCOUNTER (EMERGENCY)
Age: 33
Discharge: HOME | End: 2018-09-03
Attending: EMERGENCY MEDICINE
Payer: MEDICAID

## 2018-09-03 VITALS
HEART RATE: 101 BPM | OXYGEN SATURATION: 99 % | TEMPERATURE: 99.4 F | RESPIRATION RATE: 16 BRPM | DIASTOLIC BLOOD PRESSURE: 86 MMHG | SYSTOLIC BLOOD PRESSURE: 108 MMHG | BODY MASS INDEX: 26.68 KG/M2 | HEIGHT: 62 IN | WEIGHT: 145 LBS

## 2018-09-03 DIAGNOSIS — B37.31 CANDIDA VAGINITIS: Primary | ICD-10-CM

## 2018-09-03 DIAGNOSIS — Z76.0 MEDICATION REFILL: ICD-10-CM

## 2018-09-03 DIAGNOSIS — R07.9 ACUTE CHEST PAIN: ICD-10-CM

## 2018-09-03 LAB
ALBUMIN SERPL-MCNC: 4 G/DL (ref 3.4–5)
ALBUMIN/GLOB SERPL: 1 {RATIO} (ref 0.8–1.7)
ALP SERPL-CCNC: 92 U/L (ref 45–117)
ALT SERPL-CCNC: 22 U/L (ref 13–56)
ANION GAP SERPL CALC-SCNC: 8 MMOL/L (ref 3–18)
APPEARANCE UR: CLEAR
AST SERPL-CCNC: 17 U/L (ref 15–37)
BASOPHILS # BLD: 0.1 K/UL (ref 0–0.1)
BASOPHILS NFR BLD: 1 % (ref 0–2)
BILIRUB SERPL-MCNC: 0.2 MG/DL (ref 0.2–1)
BILIRUB UR QL: NEGATIVE
BUN SERPL-MCNC: 8 MG/DL (ref 7–18)
BUN/CREAT SERPL: 10 (ref 12–20)
CALCIUM SERPL-MCNC: 8.5 MG/DL (ref 8.5–10.1)
CHLORIDE SERPL-SCNC: 109 MMOL/L (ref 100–108)
CK MB CFR SERPL CALC: NORMAL % (ref 0–4)
CK MB SERPL-MCNC: <1 NG/ML (ref 5–25)
CK SERPL-CCNC: 63 U/L (ref 26–192)
CO2 SERPL-SCNC: 22 MMOL/L (ref 21–32)
COLOR UR: YELLOW
CREAT SERPL-MCNC: 0.8 MG/DL (ref 0.6–1.3)
DIFFERENTIAL METHOD BLD: ABNORMAL
EOSINOPHIL # BLD: 0.3 K/UL (ref 0–0.4)
EOSINOPHIL NFR BLD: 3 % (ref 0–5)
ERYTHROCYTE [DISTWIDTH] IN BLOOD BY AUTOMATED COUNT: 12.8 % (ref 11.6–14.5)
GLOBULIN SER CALC-MCNC: 4.1 G/DL (ref 2–4)
GLUCOSE SERPL-MCNC: 104 MG/DL (ref 74–99)
GLUCOSE UR STRIP.AUTO-MCNC: NEGATIVE MG/DL
HCT VFR BLD AUTO: 41.4 % (ref 35–45)
HGB BLD-MCNC: 14.7 G/DL (ref 12–16)
HGB UR QL STRIP: NEGATIVE
KETONES UR QL STRIP.AUTO: NEGATIVE MG/DL
LEUKOCYTE ESTERASE UR QL STRIP.AUTO: NEGATIVE
LYMPHOCYTES # BLD: 3.8 K/UL (ref 0.9–3.6)
LYMPHOCYTES NFR BLD: 38 % (ref 21–52)
MCH RBC QN AUTO: 33.5 PG (ref 24–34)
MCHC RBC AUTO-ENTMCNC: 35.5 G/DL (ref 31–37)
MCV RBC AUTO: 94.3 FL (ref 74–97)
MONOCYTES # BLD: 0.6 K/UL (ref 0.05–1.2)
MONOCYTES NFR BLD: 6 % (ref 3–10)
NEUTS SEG # BLD: 5.1 K/UL (ref 1.8–8)
NEUTS SEG NFR BLD: 52 % (ref 40–73)
NITRITE UR QL STRIP.AUTO: NEGATIVE
PH UR STRIP: 5 [PH] (ref 5–8)
PLATELET # BLD AUTO: 264 K/UL (ref 135–420)
PMV BLD AUTO: 8.6 FL (ref 9.2–11.8)
POTASSIUM SERPL-SCNC: 3.6 MMOL/L (ref 3.5–5.5)
PROT SERPL-MCNC: 8.1 G/DL (ref 6.4–8.2)
PROT UR STRIP-MCNC: NEGATIVE MG/DL
RBC # BLD AUTO: 4.39 M/UL (ref 4.2–5.3)
SERVICE CMNT-IMP: NORMAL
SODIUM SERPL-SCNC: 139 MMOL/L (ref 136–145)
SP GR UR REFRACTOMETRY: 1.02 (ref 1–1.03)
TROPONIN I SERPL-MCNC: <0.02 NG/ML (ref 0–0.04)
UROBILINOGEN UR QL STRIP.AUTO: 1 EU/DL (ref 0.2–1)
WBC # BLD AUTO: 9.8 K/UL (ref 4.6–13.2)
WET PREP GENITAL: NORMAL

## 2018-09-03 PROCEDURE — 71045 X-RAY EXAM CHEST 1 VIEW: CPT

## 2018-09-03 PROCEDURE — 81003 URINALYSIS AUTO W/O SCOPE: CPT | Performed by: PHYSICIAN ASSISTANT

## 2018-09-03 PROCEDURE — 93005 ELECTROCARDIOGRAM TRACING: CPT

## 2018-09-03 PROCEDURE — 99285 EMERGENCY DEPT VISIT HI MDM: CPT

## 2018-09-03 PROCEDURE — 87210 SMEAR WET MOUNT SALINE/INK: CPT | Performed by: EMERGENCY MEDICINE

## 2018-09-03 PROCEDURE — 85025 COMPLETE CBC W/AUTO DIFF WBC: CPT | Performed by: PHYSICIAN ASSISTANT

## 2018-09-03 PROCEDURE — 82550 ASSAY OF CK (CPK): CPT | Performed by: PHYSICIAN ASSISTANT

## 2018-09-03 PROCEDURE — 74011250637 HC RX REV CODE- 250/637: Performed by: EMERGENCY MEDICINE

## 2018-09-03 PROCEDURE — 80053 COMPREHEN METABOLIC PANEL: CPT | Performed by: PHYSICIAN ASSISTANT

## 2018-09-03 PROCEDURE — 87491 CHLMYD TRACH DNA AMP PROBE: CPT | Performed by: EMERGENCY MEDICINE

## 2018-09-03 RX ORDER — ATENOLOL 50 MG/1
50 TABLET ORAL
Status: COMPLETED | OUTPATIENT
Start: 2018-09-03 | End: 2018-09-03

## 2018-09-03 RX ORDER — ATENOLOL 50 MG/1
50 TABLET ORAL DAILY
Qty: 14 TAB | Refills: 0 | Status: SHIPPED | OUTPATIENT
Start: 2018-09-03 | End: 2018-09-28 | Stop reason: SDUPTHER

## 2018-09-03 RX ORDER — FLUCONAZOLE 150 MG/1
150 TABLET ORAL
Status: COMPLETED | OUTPATIENT
Start: 2018-09-03 | End: 2018-09-03

## 2018-09-03 RX ORDER — MIRTAZAPINE 45 MG/1
45 TABLET, FILM COATED ORAL
Qty: 14 TAB | Refills: 0 | Status: SHIPPED | OUTPATIENT
Start: 2018-09-03 | End: 2018-09-17

## 2018-09-03 RX ADMIN — ATENOLOL 50 MG: 50 TABLET ORAL at 13:44

## 2018-09-03 RX ADMIN — FLUCONAZOLE 150 MG: 150 TABLET ORAL at 13:44

## 2018-09-03 NOTE — DISCHARGE INSTRUCTIONS
Candidiasis: Care Instructions  Your Care Instructions  Candidiasis (say \"azu-ppe-WE-uh-barbra\") is a yeast infection. Yeast normally lives in your body. But it can cause problems if your body's defenses don't work as they should. Some medicines can increase your chance of getting a yeast infection. These include antibiotics, steroids, and cancer drugs. And some diseases like AIDS and diabetes can make you more likely to get yeast infections. There are different types of yeast infections. Carly Rios is a yeast infection in the mouth. It usually occurs in people with weak immune systems. It causes white patches inside the mouth and throat. Yeast infections of the skin usually occur in skin folds where the skin stays moist. They cause red, oozing patches on your skin. Babies can get these infections under the diaper. People who often wear gloves can get them on their hands. Many women get vaginal yeast infections. They are most common when women take antibiotics. These infections can cause the vagina to itch and burn. They also cause white discharge that looks like cottage cheese. In rare cases, yeast infects the blood. This can cause serious disease. This kind of infection is treated with medicine given through a needle into a vein (IV). After you start treatment, a yeast infection usually goes away quickly. But if your immune system is weak, the infection may come back. Tell your doctor if you get yeast infections often. Follow-up care is a key part of your treatment and safety. Be sure to make and go to all appointments, and call your doctor if you are having problems. It's also a good idea to know your test results and keep a list of the medicines you take. How can you care for yourself at home? · Take your medicines exactly as prescribed. Call your doctor if you think you are having a problem with your medicine. · Use antibiotics only as directed by your doctor. · Eat yogurt with live cultures.  It has bacteria called lactobacillus. It may help prevent some types of yeast infections. · Keep your skin clean and dry. Put powder on moist places. · If you are using a cream or suppository to treat a vaginal yeast infection, don't use condoms or a diaphragm. Use a different type of birth control. · Eat a healthy diet and get regular exercise. This will help keep your immune system strong. When should you call for help? Watch closely for changes in your health, and be sure to contact your doctor if:    · You do not get better as expected. Where can you learn more? Go to http://[employer].info/. Enter V794 in the search box to learn more about \"Candidiasis: Care Instructions. \"  Current as of: October 6, 2017  Content Version: 11.7  © 6921-7327 [employer]. Care instructions adapted under license by [employer] (which disclaims liability or warranty for this information). If you have questions about a medical condition or this instruction, always ask your healthcare professional. [place] [address] any warranty or liability for your use of this information. Chest Pain: Care Instructions  Your Care Instructions    There are many things that can cause chest pain. Some are not serious and will get better on their own in a few days. But some kinds of chest pain need more testing and treatment. Your doctor may have recommended a follow-up visit in the next 8 to 12 hours. If you are not getting better, you may need more tests or treatment. Even though your doctor has released you, you still need to watch for any problems. The doctor carefully checked you, but sometimes problems can develop later. If you have new symptoms or if your symptoms do not get better, get medical care right away.   If you have worse or different chest pain or pressure that lasts more than 5 minutes or you passed out (lost consciousness), call 911 or seek other emergency help right away. A medical visit is only one step in your treatment. Even if you feel better, you still need to do what your doctor recommends, such as going to all suggested follow-up appointments and taking medicines exactly as directed. This will help you recover and help prevent future problems. How can you care for yourself at home? · Rest until you feel better. · Take your medicine exactly as prescribed. Call your doctor if you think you are having a problem with your medicine. · Do not drive after taking a prescription pain medicine. When should you call for help? Call 911 if:    · You passed out (lost consciousness).     · You have severe difficulty breathing.     · You have symptoms of a heart attack. These may include:  ¨ Chest pain or pressure, or a strange feeling in your chest.  ¨ Sweating. ¨ Shortness of breath. ¨ Nausea or vomiting. ¨ Pain, pressure, or a strange feeling in your back, neck, jaw, or upper belly or in one or both shoulders or arms. ¨ Lightheadedness or sudden weakness. ¨ A fast or irregular heartbeat. After you call 911, the  may tell you to chew 1 adult-strength or 2 to 4 low-dose aspirin. Wait for an ambulance. Do not try to drive yourself.    Call your doctor today if:    · You have any trouble breathing.     · Your chest pain gets worse.     · You are dizzy or lightheaded, or you feel like you may faint.     · You are not getting better as expected.     · You are having new or different chest pain. Where can you learn more? Go to http://[employer]encion-kenzie.info/. Enter A120 in the search box to learn more about \"Chest Pain: Care Instructions. \"  Current as of: November 20, 2017  Content Version: 11.7  © 5411-9138 [employer]. Care instructions adapted under license by [employer] (which disclaims liability or warranty for this information).  If you have questions about a medical condition or this instruction, always ask your healthcare professional. [address] any warranty or liability for your use of this information.

## 2018-09-03 NOTE — ED PROVIDER NOTES
EMERGENCY DEPARTMENT HISTORY AND PHYSICAL EXAM 
 
1:00 PM 
 
 
Date: 9/3/2018 Patient Name: Loco Curtis History of Presenting Illness Chief Complaint Patient presents with  Vaginal Pain x4 months  Chest Pain x1 week History Provided By: Patient Chief Complaint: CP 
Duration: 1 Weeks Timing:  Intermittent Location: Diffuse chest 
Quality: \"like a spiderweb in my chest\", aching Severity: 7 out of 10 Modifying Factors: none reported Associated Symptoms: vaginal pain, swelling, dyspareunia Additional History (Context): Loco Curtis is a 27 y.o. female with hep C, COPD, psychiatric disorder, methamphetamine abuse, tachycardia who presents with intermittent, severe chest discomfort and aching for 1 wk. Pt states she has been out of atenolol, zoloft for 1 month; she still has seroquel. Pt is also c/o dyspareunia, worsening vaginal pain, swelling, and growths x4 months. She states she had a hysterectomy 4 months ago. Pt uses tobacco, no etOH, illicit drug use. Denies SI. Pt is also out of her inhalers. PCP: Rena Rodriguez MD 
 
Current Outpatient Prescriptions Medication Sig Dispense Refill  mirtazapine (REMERON) 45 mg tablet Take 1 Tab by mouth nightly for 14 days. 14 Tab 0  
 atenolol (TENORMIN) 50 mg tablet Take 1 Tab by mouth daily. 14 Tab 0  
 albuterol sulfate (PROAIR RESPICLICK) 90 mcg/actuation aepb Take 2 Puffs by inhalation every four (4) hours as needed. 1 Inhaler 0  
 sertraline (ZOLOFT) 100 mg tablet Take 200 mg by mouth daily.  ondansetron hcl (ZOFRAN) 8 mg tablet Take 1 Tab by mouth every eight (8) hours as needed for Nausea for up to 12 doses. 12 Tab 0  
 QUEtiapine (SEROQUEL) 300 mg tablet Take 600 mg by mouth nightly. Past History Past Medical History: 
Past Medical History:  
Diagnosis Date  Hep C w/o coma, chronic (HCC)  Methamphetamine abuse in remission  Schizo-affective psychosis ([facility] [address]  Tachycardia Past Surgical History: 
Past Surgical History:  
Procedure Laterality Date  HX APPENDECTOMY  HX CHOLECYSTECTOMY  HX HYSTERECTOMY  HX OTHER SURGICAL    
 right middle lobe wedge resection Family History: 
History reviewed. No pertinent family history. Social History: 
Social History Substance Use Topics  Smoking status: Former Smoker Packs/day: 1.00  Smokeless tobacco: Never Used  Alcohol use No  
 
 
Allergies: Allergies Allergen Reactions  Gabapentin Anaphylaxis  Sulfa (Sulfonamide Antibiotics) Anaphylaxis  Amoxicillin Hives  Toradol [Ketorolac] Hives  Vistaril [Hydroxyzine Pamoate] Unknown (comments) Review of Systems Review of Systems Constitutional: Negative for fever. Cardiovascular: Positive for chest pain. Negative for leg swelling. Genitourinary: Positive for dyspareunia and vaginal pain. Positive for vaginal swelling All other systems reviewed and are negative. Physical Exam  
 
Visit Vitals  /84 (BP 1 Location: Right arm, BP Patient Position: Sitting)  Pulse (!) 101  Temp 99.4 °F (37.4 °C)  Resp 16  
 Ht 5' 2\" (1.575 m)  Wt 65.8 kg (145 lb)  SpO2 98%  BMI 26.52 kg/m2 Physical Exam  
Constitutional: She is oriented to person, place, and time. She appears well-developed and well-nourished. No distress. HENT:  
Head: Normocephalic and atraumatic. Mouth/Throat: Oropharynx is clear and moist.  
Eyes: Conjunctivae and EOM are normal. Pupils are equal, round, and reactive to light. No scleral icterus. Neck: Normal range of motion. Neck supple. Cardiovascular: Normal rate, regular rhythm and normal heart sounds. No murmur heard. Pulmonary/Chest: Effort normal and breath sounds normal. No respiratory distress. Abdominal: Soft. Bowel sounds are normal. She exhibits no distension. There is no tenderness. Genitourinary: Genitourinary Comments: Chaperoned by Avel Villegas, ED Tech Cuff intact Mild amount of white vaginal discharge Musculoskeletal: She exhibits no edema. Lymphadenopathy:  
  She has no cervical adenopathy. Neurological: She is alert and oriented to person, place, and time. Coordination normal.  
Skin: Skin is warm and dry. No rash noted. Psychiatric: She has a normal mood and affect. Her behavior is normal.  
Nursing note and vitals reviewed. Diagnostic Study Results Labs - Recent Results (from the past 12 hour(s)) URINALYSIS W/ RFLX MICROSCOPIC Collection Time: 09/03/18 12:58 PM  
Result Value Ref Range Color YELLOW Appearance CLEAR Specific gravity 1.019 1.005 - 1.030    
 pH (UA) 5.0 5.0 - 8.0 Protein NEGATIVE  NEG mg/dL Glucose NEGATIVE  NEG mg/dL Ketone NEGATIVE  NEG mg/dL Bilirubin NEGATIVE  NEG Blood NEGATIVE  NEG Urobilinogen 1.0 0.2 - 1.0 EU/dL Nitrites NEGATIVE  NEG Leukocyte Esterase NEGATIVE  NEG    
EKG, 12 LEAD, INITIAL Collection Time: 09/03/18 12:59 PM  
Result Value Ref Range Ventricular Rate 84 BPM  
 Atrial Rate 84 BPM  
 P-R Interval 148 ms QRS Duration 78 ms Q-T Interval 358 ms QTC Calculation (Bezet) 423 ms Calculated P Axis 69 degrees Calculated R Axis 76 degrees Calculated T Axis 34 degrees Diagnosis Normal sinus rhythm with sinus arrhythmia Possible Left atrial enlargement Borderline ECG When compared with ECG of 06-AUG-2018 21:02, No significant change was found CARDIAC PANEL,(CK, CKMB & TROPONIN) Collection Time: 09/03/18  1:14 PM  
Result Value Ref Range CK 63 26 - 192 U/L  
 CK - MB <1.0 <3.6 ng/ml CK-MB Index  0.0 - 4.0 % CALCULATION NOT PERFORMED WHEN RESULT IS BELOW LINEAR LIMIT Troponin-I, Qt. <0.02 0.0 - 0.045 NG/ML  
CBC WITH AUTOMATED DIFF Collection Time: 09/03/18  1:14 PM  
Result Value Ref Range WBC 9.8 4.6 - 13.2 K/uL  
 RBC 4.39 4.20 - 5.30 M/uL HGB 14.7 12.0 - 16.0 g/dL HCT 41.4 35.0 - 45.0 % MCV 94.3 74.0 - 97.0 FL  
 MCH 33.5 24.0 - 34.0 PG  
 MCHC 35.5 31.0 - 37.0 g/dL  
 RDW 12.8 11.6 - 14.5 % PLATELET 615 509 - 262 K/uL MPV 8.6 (L) 9.2 - 11.8 FL  
 NEUTROPHILS 52 40 - 73 % LYMPHOCYTES 38 21 - 52 % MONOCYTES 6 3 - 10 % EOSINOPHILS 3 0 - 5 % BASOPHILS 1 0 - 2 %  
 ABS. NEUTROPHILS 5.1 1.8 - 8.0 K/UL  
 ABS. LYMPHOCYTES 3.8 (H) 0.9 - 3.6 K/UL  
 ABS. MONOCYTES 0.6 0.05 - 1.2 K/UL  
 ABS. EOSINOPHILS 0.3 0.0 - 0.4 K/UL  
 ABS. BASOPHILS 0.1 0.0 - 0.1 K/UL  
 DF AUTOMATED METABOLIC PANEL, COMPREHENSIVE Collection Time: 09/03/18  1:14 PM  
Result Value Ref Range Sodium 139 136 - 145 mmol/L Potassium 3.6 3.5 - 5.5 mmol/L Chloride 109 (H) 100 - 108 mmol/L  
 CO2 22 21 - 32 mmol/L Anion gap 8 3.0 - 18 mmol/L Glucose 104 (H) 74 - 99 mg/dL BUN 8 7.0 - 18 MG/DL Creatinine 0.80 0.6 - 1.3 MG/DL  
 BUN/Creatinine ratio 10 (L) 12 - 20 GFR est AA >60 >60 ml/min/1.73m2 GFR est non-AA >60 >60 ml/min/1.73m2 Calcium 8.5 8.5 - 10.1 MG/DL Bilirubin, total 0.2 0.2 - 1.0 MG/DL  
 ALT (SGPT) 22 13 - 56 U/L  
 AST (SGOT) 17 15 - 37 U/L Alk. phosphatase 92 45 - 117 U/L Protein, total 8.1 6.4 - 8.2 g/dL Albumin 4.0 3.4 - 5.0 g/dL Globulin 4.1 (H) 2.0 - 4.0 g/dL A-G Ratio 1.0 0.8 - 1.7 WET PREP Collection Time: 09/03/18  1:30 PM  
Result Value Ref Range Special Requests: NO SPECIAL REQUESTS Wet prep NO YEAST,TRICHOMONAS OR CLUE CELLS NOTED Radiologic Studies -  
XR CHEST PORT    As read by Sudeep Springer MD no acute process No results found. Medical Decision Making I am the first provider for this patient. I reviewed the vital signs, available nursing notes, past medical history, past surgical history, family history and social history. Vital Signs-Reviewed the patient's vital signs.  
 
Pulse Oximetry Analysis -  98% on room air (Interpretation) wnl 
 
 Cardiac Monitor: 
Rate: 101 EKG: Interpreted by the EP. Time Interpreted: 1300 Rate: 84 Rhythm: Normal Sinus Rhythm Interpretation: no acute STT wave changes Records Reviewed: Nursing Notes (Time of Review: 1:00 PM) ED Course: Progress Notes, Reevaluation, and Consults: 
 
 
 
Provider Notes (Medical Decision Making): MDM Number of Diagnoses or Management Options Acute chest pain:  
Candida vaginitis:  
Medication refill:  
Diagnosis management comments: Chest pain per pt related to not having her psychiatric medications also c/o vaginal pain/discharge Heart score 2 in ED Amount and/or Complexity of Data Reviewed Clinical lab tests: ordered and reviewed Tests in the radiology section of CPT®: ordered and reviewed Diagnosis Clinical Impression: 1. Candida vaginitis 2. Medication refill 3. Acute chest pain Disposition: home Follow-up Information Follow up With Details Comments Contact [facility] EMERGENCY DEPT  As needed, If symptoms worsen [address] 
[phone] Roni Rausch MD Schedule an appointment as soon as possible for a visit for ED follow up appointment  [phone] [phone] 
[phone] Brandy Royal MD Schedule an appointment as soon as possible for a visit for ED follow up appointment  [address] [facility] [phone] [address] [phone] 
[phone] Patient's Medications Start Taking ALBUTEROL SULFATE (PROAIR RESPICLICK) 90 MCG/ACTUATION AEPB    Take 2 Puffs by inhalation every four (4) hours as needed. ATENOLOL (TENORMIN) 50 MG TABLET    Take 1 Tab by mouth daily. Continue Taking ONDANSETRON HCL (ZOFRAN) 8 MG TABLET    Take 1 Tab by mouth every eight (8) hours as needed for Nausea for up to 12 doses. QUETIAPINE (SEROQUEL) 300 MG TABLET    Take 600 mg by mouth nightly. SERTRALINE (ZOLOFT) 100 MG TABLET    Take 200 mg by mouth daily. These Medications have changed Modified Medication Previous Medication MIRTAZAPINE (REMERON) 45 MG TABLET mirtazapine (REMERON) 45 mg tablet Take 1 Tab by mouth nightly for 14 days. Take 45 mg by mouth nightly. Stop Taking ATENOLOL (TENORMIN) 25 MG TABLET    Take 25 mg by mouth daily. _______________________________ Attestations: 
Scribe Attestation Heath Reardon acting as a scribe for and in the presence of Noreen Cooks, MD     
September 03, 2018 at [facility] PM 
    
Provider Attestation:     
I personally performed the services described in the documentation, reviewed the documentation, as recorded by the scribe in my presence, and it accurately and completely records my words and actions. September 03, 2018 at 2:08 PM - Noreen Cooks, MD   
_______________________________

## 2018-09-04 LAB
ATRIAL RATE: 84 BPM
CALCULATED P AXIS, ECG09: 69 DEGREES
CALCULATED R AXIS, ECG10: 76 DEGREES
CALCULATED T AXIS, ECG11: 34 DEGREES
DIAGNOSIS, 93000: NORMAL
P-R INTERVAL, ECG05: 148 MS
Q-T INTERVAL, ECG07: 358 MS
QRS DURATION, ECG06: 78 MS
QTC CALCULATION (BEZET), ECG08: 423 MS
VENTRICULAR RATE, ECG03: 84 BPM

## 2018-09-05 LAB
C TRACH RRNA SPEC QL NAA+PROBE: NEGATIVE
N GONORRHOEA RRNA SPEC QL NAA+PROBE: NEGATIVE
SPECIMEN SOURCE: NORMAL

## 2018-09-11 ENCOUNTER — OFFICE VISIT (OUTPATIENT)
Dept: FAMILY MEDICINE CLINIC | Age: 33
End: 2018-09-11

## 2018-09-11 VITALS
TEMPERATURE: 99.1 F | HEART RATE: 98 BPM | DIASTOLIC BLOOD PRESSURE: 72 MMHG | WEIGHT: 147.4 LBS | BODY MASS INDEX: 27.12 KG/M2 | SYSTOLIC BLOOD PRESSURE: 108 MMHG | HEIGHT: 62 IN | OXYGEN SATURATION: 98 % | RESPIRATION RATE: 18 BRPM

## 2018-09-11 DIAGNOSIS — J41.1 MUCOPURULENT CHRONIC BRONCHITIS (HCC): ICD-10-CM

## 2018-09-11 DIAGNOSIS — B00.9 HSV-1 (HERPES SIMPLEX VIRUS 1) INFECTION: ICD-10-CM

## 2018-09-11 DIAGNOSIS — L73.9 FOLLICULITIS: ICD-10-CM

## 2018-09-11 DIAGNOSIS — K60.2 ANAL FISSURE AND FISTULA: ICD-10-CM

## 2018-09-11 DIAGNOSIS — R00.0 TACHYCARDIA: ICD-10-CM

## 2018-09-11 DIAGNOSIS — M62.838 MUSCLE SPASM: ICD-10-CM

## 2018-09-11 DIAGNOSIS — F43.10 PTSD (POST-TRAUMATIC STRESS DISORDER): ICD-10-CM

## 2018-09-11 DIAGNOSIS — Z23 NEED FOR PNEUMOCOCCAL VACCINATION: ICD-10-CM

## 2018-09-11 DIAGNOSIS — G62.9 NEUROPATHY: ICD-10-CM

## 2018-09-11 DIAGNOSIS — K60.3 ANAL FISSURE AND FISTULA: ICD-10-CM

## 2018-09-11 DIAGNOSIS — B18.2 HEP C W/O COMA, CHRONIC (HCC): ICD-10-CM

## 2018-09-11 DIAGNOSIS — Z23 NEEDS FLU SHOT: ICD-10-CM

## 2018-09-11 DIAGNOSIS — F41.9 ANXIETY: Primary | ICD-10-CM

## 2018-09-11 RX ORDER — ACYCLOVIR 400 MG/1
400 TABLET ORAL
COMMUNITY
End: 2018-09-11 | Stop reason: SDUPTHER

## 2018-09-11 RX ORDER — GABAPENTIN 100 MG/1
100 CAPSULE ORAL 3 TIMES DAILY
Qty: 90 CAP | Refills: 2 | Status: SHIPPED | OUTPATIENT
Start: 2018-09-11 | End: 2018-09-28 | Stop reason: DRUGHIGH

## 2018-09-11 RX ORDER — BUSPIRONE HYDROCHLORIDE 7.5 MG/1
7.5 TABLET ORAL
Qty: 90 TAB | Refills: 1 | Status: SHIPPED | OUTPATIENT
Start: 2018-09-11 | End: 2018-09-28 | Stop reason: DRUGHIGH

## 2018-09-11 RX ORDER — BACLOFEN 20 MG/1
20 TABLET ORAL 3 TIMES DAILY
COMMUNITY
End: 2018-09-11 | Stop reason: SDUPTHER

## 2018-09-11 RX ORDER — BACLOFEN 20 MG/1
20 TABLET ORAL 3 TIMES DAILY
Qty: 90 TAB | Refills: 11 | Status: SHIPPED | OUTPATIENT
Start: 2018-09-11 | End: 2019-09-20 | Stop reason: SDUPTHER

## 2018-09-11 RX ORDER — ACYCLOVIR 400 MG/1
400 TABLET ORAL
Qty: 40 TAB | Refills: 1 | Status: SHIPPED | OUTPATIENT
Start: 2018-09-11 | End: 2019-05-31

## 2018-09-11 RX ORDER — SERTRALINE HYDROCHLORIDE 100 MG/1
200 TABLET, FILM COATED ORAL DAILY
Qty: 60 TAB | Refills: 11 | Status: SHIPPED | OUTPATIENT
Start: 2018-09-11 | End: 2019-10-16

## 2018-09-11 RX ORDER — BUDESONIDE AND FORMOTEROL FUMARATE DIHYDRATE 160; 4.5 UG/1; UG/1
2 AEROSOL RESPIRATORY (INHALATION) 2 TIMES DAILY
Qty: 1 INHALER | Refills: 2 | Status: SHIPPED | OUTPATIENT
Start: 2018-09-11 | End: 2018-12-27 | Stop reason: SDUPTHER

## 2018-09-11 RX ORDER — DOXYCYCLINE 100 MG/1
100 TABLET ORAL 2 TIMES DAILY
Qty: 20 TAB | Refills: 0 | Status: SHIPPED | OUTPATIENT
Start: 2018-09-11 | End: 2018-09-21

## 2018-09-11 NOTE — PROGRESS NOTES
1. Have you been to the ER, urgent care clinic since your last visit? Hospitalized since your last visit? Yes. [facility] ED for chest pain & GYN concerns 2. Have you seen or consulted any other health care providers outside of the [address] [place] [address] since your last visit? Include any pap smears or colon screening. Yes.

## 2018-09-11 NOTE — MR AVS SNAPSHOT
[address] [place] [phone] 
[phone] Patient: Erika Peterson MRN: [id number] :1985 Visit Information Date & Time Provider Department Dept. Phone Encounter #  
 2018  2:30 PM [facility] [phone] Follow-up Instructions Return in about 1 month (around 10/11/2018) for medication evaluation. Upcoming Health Maintenance Date Due DTaP/Tdap/Td series (1 - Tdap) 2004 PAP AKA CERVICAL CYTOLOGY 2004 Influenza Age 5 to Adult 2018 Allergies as of 2018  Review Complete On: 2018 By: Jose Juan Zepeda MD  
  
 Severity Noted Reaction Type Reactions Sulfa (Sulfonamide Antibiotics) High 2018    Anaphylaxis Amoxicillin  2018    Hives Toradol [Ketorolac]  2018    Hives Vistaril [Hydroxyzine Pamoate]  2018    Unknown (comments) Current Immunizations  Reviewed on 2018 Name Date Influenza Vaccine (Quad) PF 2018 Pneumococcal Polysaccharide (PPSV-23) 2018 Reviewed by Latosha Anderson LPN on  at  3:45 PM  
You Were Diagnosed With   
  
 Codes Comments Anxiety    -  Primary ICD-10-CM: F41.9 ICD-9-CM: 300.00 PTSD (post-traumatic stress disorder)     ICD-10-CM: F43.10 ICD-9-CM: 309.81 Tachycardia     ICD-10-CM: R00.0 ICD-9-CM: 785.0 Hep C w/o coma, chronic (HCC)     ICD-10-CM: B18.2 ICD-9-CM: 070.54 Anal fissure and fistula     ICD-10-CM: K60.2, K60.3 ICD-9-CM: 565.0, 565.1 Muscle spasm     ICD-10-CM: E16.229 ICD-9-CM: 728.85 HSV-1 (herpes simplex virus 1) infection     ICD-10-CM: B00.9 ICD-9-CM: 054.9 Folliculitis     EZK-94-PN: L73.9 ICD-9-CM: 704.8 Needs flu shot     ICD-10-CM: Z81 ICD-9-CM: V04.81 Need for pneumococcal vaccination     ICD-10-CM: X14 ICD-9-CM: V03.82   
 Mucopurulent chronic bronchitis ([facility] [address]     ICD-10-CM: J41.1 ICD-9-CM: 491.1 Neuropathy     ICD-10-CM: G62.9 ICD-9-CM: 127. 9 Vitals BP Pulse Temp Resp Height(growth percentile) Weight(growth percentile) 108/72 (BP 1 Location: Right arm, BP Patient Position: Sitting) 98 99.1 °F (37.3 °C) (Oral) 18 5' 2\" (1.575 m) 147 lb 6.4 oz (66.9 kg) SpO2 BMI OB Status Smoking Status 98% 26.96 kg/m2 Hysterectomy Former Smoker Vitals History BMI and BSA Data Body Mass Index Body Surface Area  
 26.96 kg/m 2 1.71 m 2 Preferred Pharmacy Pharmacy Name Phone [facility] - [address], [address] [phone] Your Updated Medication List  
  
   
This list is accurate as of 9/11/18  3:45 PM.  Always use your most recent med list.  
  
  
  
  
 acyclovir 400 mg tablet Commonly known as:  ZOVIRAX Take 1 Tab by mouth every four (4) hours (while awake). albuterol sulfate 90 mcg/actuation Aepb Commonly known as:  Toro Kiang Take 2 Puffs by inhalation every four (4) hours as needed. atenolol 50 mg tablet Commonly known as:  TENORMIN Take 1 Tab by mouth daily. baclofen 20 mg tablet Commonly known as:  LIORESAL Take 1 Tab by mouth three (3) times daily. budesonide-formoterol 160-4.5 mcg/actuation Hfaa Commonly known as:  SYMBICORT Take 2 Puffs by inhalation two (2) times a day. busPIRone 7.5 mg tablet Commonly known as:  BUSPAR Take 1 Tab by mouth three (3) times daily as needed. doxycycline 100 mg tablet Commonly known as:  ADOXA Take 1 Tab by mouth two (2) times a day for 10 days. gabapentin 100 mg capsule Commonly known as:  NEURONTIN Take 1 Cap by mouth three (3) times daily. mirtazapine 45 mg tablet Commonly known as:  [place] Take 1 Tab by mouth nightly for 14 days. ondansetron hcl 8 mg tablet Commonly known as:  [place]  
 Take 1 Tab by mouth every eight (8) hours as needed for Nausea for up to 12 doses. SEROquel 300 mg tablet Generic drug:  QUEtiapine Take 600 mg by mouth nightly. sertraline 100 mg tablet Commonly known as:  ZOLOFT Take 2 Tabs by mouth daily. Prescriptions Sent to Pharmacy Refills  
 busPIRone (BUSPAR) 7.5 mg tablet 1 Sig: Take 1 Tab by mouth three (3) times daily as needed. Class: Normal  
 Pharmacy: [address] [place] [address],  [facility] Ph #: [phone] Route: Oral  
 baclofen (LIORESAL) 20 mg tablet 11 Sig: Take 1 Tab by mouth three (3) times daily. Class: Normal  
 Pharmacy: [address] [place] [address],  [facility] Ph #: [phone] Route: Oral  
 acyclovir (ZOVIRAX) 400 mg tablet 1 Sig: Take 1 Tab by mouth every four (4) hours (while awake). Class: Normal  
 Pharmacy: [address] [place] [address],  [facility] Ph #: [phone] Route: Oral  
 sertraline (ZOLOFT) 100 mg tablet 11 Sig: Take 2 Tabs by mouth daily. Class: Normal  
 Pharmacy: [address] [place] [address],  [facility] Ph #: [phone] Route: Oral  
 doxycycline (ADOXA) 100 mg tablet 0 Sig: Take 1 Tab by mouth two (2) times a day for 10 days. Class: Normal  
 Pharmacy: [address] [place] [address],  [facility] Ph #: [phone] Route: Oral  
 budesonide-formoterol (SYMBICORT) 160-4.5 mcg/actuation HFAA 2 Sig: Take 2 Puffs by inhalation two (2) times a day. Class: Normal  
 Pharmacy: [address] [place] [address],  [facility] Ph #: [phone] Route: Inhalation  
 gabapentin (NEURONTIN) 100 mg capsule 2 Sig: Take 1 Cap by mouth three (3) times daily. Class: Normal  
 Pharmacy: [address] [place] [address],  [facility] Ph #: [phone] Route: Oral  
  
We Performed the Following INFLUENZA VIRUS VAC QUAD,SPLIT,PRESV FREE SYRINGE IM [id number] CPT(R)] PNEUMOCOCCAL POLYSACCHARIDE VACCINE, 23-VALENT, ADULT OR IMMUNOSUPPRESSED PT DOSE, [75949 CPT(R)] REFERRAL TO GASTROENTEROLOGY [RNQ19 Custom] Comments:  
 Please evaluate patient for hepatitis c.  
 REFERRAL TO SURGERY [GTJ132 Custom] Comments:  
 Please evaluate pt for chronic fistula s/p anal trauma with repair Follow-up Instructions Return in about 1 month (around 10/11/2018) for medication evaluation. Referral Information Referral ID Referred By Referred To  
  
 [id number] RAVI ORTEGA MD   
   [address] [facility] Phone: [phone] Fax: [phone] Visits Status Start Date End Date 1 New Request 9/11/18 9/11/19 If your referral has a status of pending review or denied, additional information will be sent to support the outcome of this decision. Referral ID Referred By Referred To  
 [id number] RAVI ORTEGA MD  
   [id number] [place] [address] [address] [place] [address] Phone: [phone] Fax: [phone] Visits Status Start Date End Date 1 New Request 9/11/18 9/11/19 If your referral has a status of pending review or denied, additional information will be sent to support the outcome of this decision. Introducing [facility]! [employer] introduces [email or web address] patient portal. Now you can access parts of your medical record, email your doctor's office, and request medication refills online. 1. In your internet browser, go to [email or web address] 2. Click on the First Time User? Click Here link in the Sign In box. You will see the New Member Sign Up page. 3. Enter your [email or web address] Access Code exactly as it appears below. You will not need to use this code after youve completed the sign-up process. If you do not sign up before the expiration date, you must request a new code. · [email or web address] Access Code: [email or web address] Expires: 10/7/2018  5:35 PM 
 
4. Enter the last four digits of your Social Security Number (xxxx) and Date of Birth (mm/dd/yyyy) as indicated and click Submit. You will be taken to the next sign-up page. 5. Create a [employer] ID. This will be your [employer] login ID and cannot be changed, so think of one that is secure and easy to remember. 6. Create a [employer] password. You can change your password at any time. 7. Enter your Password Reset Question and Answer. This can be used at a later time if you forget your password. 8. Enter your e-mail address. You will receive e-mail notification when new information is available in [address] 9. Click Sign Up. You can now view and download portions of your medical record. 10. Click the Download Summary menu link to download a portable copy of your medical information. If you have questions, please visit the Frequently Asked Questions section of the [employer] website. Remember, [employer] is NOT to be used for urgent needs. For medical emergencies, dial 911. Now available from your iPhone and Android! Please provide this summary of care documentation to your next provider. Your primary care clinician is listed as Malka Galeana. If you have any questions after today's visit, please call [phone].
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
## 2018-09-24 ENCOUNTER — TELEPHONE (OUTPATIENT)
Dept: FAMILY MEDICINE CLINIC | Age: 33
End: 2018-09-24

## 2018-09-24 NOTE — TELEPHONE ENCOUNTER
Patient called stating she has chest pains and it comes sporadically. Patient continue to say that her mother  from illness and she was concerned. Nurse advised patient to got to ER and for patient to schedule a follow up appointment for medication evaluation. Pt verbalized understanding and will go to nearest ER. This encounter will be closed.

## 2018-09-26 ENCOUNTER — OFFICE VISIT (OUTPATIENT)
Dept: SURGERY | Age: 33
End: 2018-09-26

## 2018-09-26 VITALS
RESPIRATION RATE: 16 BRPM | OXYGEN SATURATION: 96 % | WEIGHT: 150 LBS | SYSTOLIC BLOOD PRESSURE: 102 MMHG | HEIGHT: 62 IN | HEART RATE: 99 BPM | TEMPERATURE: 98.4 F | DIASTOLIC BLOOD PRESSURE: 68 MMHG | BODY MASS INDEX: 27.6 KG/M2

## 2018-09-26 DIAGNOSIS — K62.89 MASS OF PERIRECTAL SOFT TISSUE: Primary | ICD-10-CM

## 2018-09-26 DIAGNOSIS — K64.8 HEMORRHOIDS WITH COMPLICATION: ICD-10-CM

## 2018-09-26 RX ORDER — HYDROCORTISONE 25 MG/G
CREAM TOPICAL
Qty: 30 G | Refills: 2 | Status: SHIPPED | OUTPATIENT
Start: 2018-09-26 | End: 2019-05-31

## 2018-09-26 RX ORDER — MIRTAZAPINE 45 MG/1
45 TABLET, FILM COATED ORAL
COMMUNITY
End: 2018-09-28 | Stop reason: SDUPTHER
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
## 2018-09-26 NOTE — PATIENT INSTRUCTIONS
If you have any questions or concerns about today's appointment, the verbal and/or written instructions you were given for follow up care, please call our office at [phone].     [facility] - [place] [phone] office  [phone]

## 2018-09-28 ENCOUNTER — OFFICE VISIT (OUTPATIENT)
Dept: FAMILY MEDICINE CLINIC | Age: 33
End: 2018-09-28

## 2018-09-28 ENCOUNTER — HOSPITAL ENCOUNTER (EMERGENCY)
Age: 33
Discharge: HOME | End: 2018-09-28
Attending: EMERGENCY MEDICINE
Payer: MEDICAID

## 2018-09-28 ENCOUNTER — APPOINTMENT (OUTPATIENT)
Dept: CT IMAGING | Age: 33
End: 2018-09-28
Attending: EMERGENCY MEDICINE
Payer: MEDICAID

## 2018-09-28 VITALS
BODY MASS INDEX: 28.26 KG/M2 | DIASTOLIC BLOOD PRESSURE: 73 MMHG | HEIGHT: 62 IN | SYSTOLIC BLOOD PRESSURE: 119 MMHG | HEART RATE: 98 BPM | OXYGEN SATURATION: 98 % | TEMPERATURE: 98.1 F | RESPIRATION RATE: 17 BRPM | WEIGHT: 153.6 LBS

## 2018-09-28 VITALS
RESPIRATION RATE: 21 BRPM | OXYGEN SATURATION: 95 % | BODY MASS INDEX: 28.16 KG/M2 | SYSTOLIC BLOOD PRESSURE: 116 MMHG | TEMPERATURE: 99.1 F | HEIGHT: 62 IN | DIASTOLIC BLOOD PRESSURE: 97 MMHG | HEART RATE: 99 BPM | WEIGHT: 153 LBS

## 2018-09-28 DIAGNOSIS — R00.0 TACHYCARDIA: Primary | ICD-10-CM

## 2018-09-28 DIAGNOSIS — K60.2 ANAL FISSURE AND FISTULA: ICD-10-CM

## 2018-09-28 DIAGNOSIS — R10.9 ABDOMINAL CRAMPING: ICD-10-CM

## 2018-09-28 DIAGNOSIS — G56.03 BILATERAL CARPAL TUNNEL SYNDROME: ICD-10-CM

## 2018-09-28 DIAGNOSIS — N39.0 ACUTE UTI: Primary | ICD-10-CM

## 2018-09-28 DIAGNOSIS — F41.9 ANXIETY: ICD-10-CM

## 2018-09-28 DIAGNOSIS — Q07.00 ARNOLD-CHIARI MALFORMATION (HCC): ICD-10-CM

## 2018-09-28 DIAGNOSIS — K60.3 ANAL FISSURE AND FISTULA: ICD-10-CM

## 2018-09-28 DIAGNOSIS — R11.0 NAUSEA: ICD-10-CM

## 2018-09-28 DIAGNOSIS — G62.9 NEUROPATHY: ICD-10-CM

## 2018-09-28 LAB
ALBUMIN SERPL-MCNC: 3.9 G/DL (ref 3.4–5)
ALBUMIN/GLOB SERPL: 1 {RATIO} (ref 0.8–1.7)
ALP SERPL-CCNC: 92 U/L (ref 45–117)
ALT SERPL-CCNC: 19 U/L (ref 13–56)
ANION GAP SERPL CALC-SCNC: 9 MMOL/L (ref 3–18)
APPEARANCE UR: ABNORMAL
AST SERPL-CCNC: 17 U/L (ref 15–37)
BACTERIA URNS QL MICRO: ABNORMAL /HPF
BASOPHILS # BLD: 0 K/UL (ref 0–0.1)
BASOPHILS NFR BLD: 0 % (ref 0–2)
BILIRUB SERPL-MCNC: 0.3 MG/DL (ref 0.2–1)
BILIRUB UR QL: NEGATIVE
BUN SERPL-MCNC: 7 MG/DL (ref 7–18)
BUN/CREAT SERPL: 9 (ref 12–20)
CALCIUM SERPL-MCNC: 8.5 MG/DL (ref 8.5–10.1)
CHLORIDE SERPL-SCNC: 106 MMOL/L (ref 100–108)
CO2 SERPL-SCNC: 25 MMOL/L (ref 21–32)
COLOR UR: ABNORMAL
CREAT SERPL-MCNC: 0.82 MG/DL (ref 0.6–1.3)
DIFFERENTIAL METHOD BLD: ABNORMAL
EOSINOPHIL # BLD: 0.2 K/UL (ref 0–0.4)
EOSINOPHIL NFR BLD: 1 % (ref 0–5)
EPITH CASTS URNS QL MICRO: ABNORMAL /LPF (ref 0–5)
ERYTHROCYTE [DISTWIDTH] IN BLOOD BY AUTOMATED COUNT: 13 % (ref 11.6–14.5)
GLOBULIN SER CALC-MCNC: 3.8 G/DL (ref 2–4)
GLUCOSE SERPL-MCNC: 85 MG/DL (ref 74–99)
GLUCOSE UR STRIP.AUTO-MCNC: NEGATIVE MG/DL
HCG UR QL: NEGATIVE
HCT VFR BLD AUTO: 39.6 % (ref 35–45)
HGB BLD-MCNC: 14.2 G/DL (ref 12–16)
HGB UR QL STRIP: NEGATIVE
KETONES UR QL STRIP.AUTO: ABNORMAL MG/DL
LEUKOCYTE ESTERASE UR QL STRIP.AUTO: ABNORMAL
LIPASE SERPL-CCNC: 120 U/L (ref 73–393)
LYMPHOCYTES # BLD: 2.3 K/UL (ref 0.9–3.6)
LYMPHOCYTES NFR BLD: 12 % (ref 21–52)
MCH RBC QN AUTO: 33.9 PG (ref 24–34)
MCHC RBC AUTO-ENTMCNC: 35.9 G/DL (ref 31–37)
MCV RBC AUTO: 94.5 FL (ref 74–97)
MONOCYTES # BLD: 1.5 K/UL (ref 0.05–1.2)
MONOCYTES NFR BLD: 7 % (ref 3–10)
NEUTS SEG # BLD: 15.6 K/UL (ref 1.8–8)
NEUTS SEG NFR BLD: 80 % (ref 40–73)
NITRITE UR QL STRIP.AUTO: NEGATIVE
PH UR STRIP: 6.5 [PH] (ref 5–8)
PLATELET # BLD AUTO: 273 K/UL (ref 135–420)
PMV BLD AUTO: 9 FL (ref 9.2–11.8)
POTASSIUM SERPL-SCNC: 3.7 MMOL/L (ref 3.5–5.5)
PROT SERPL-MCNC: 7.7 G/DL (ref 6.4–8.2)
PROT UR STRIP-MCNC: 30 MG/DL
RBC # BLD AUTO: 4.19 M/UL (ref 4.2–5.3)
RBC #/AREA URNS HPF: ABNORMAL /HPF (ref 0–5)
SODIUM SERPL-SCNC: 140 MMOL/L (ref 136–145)
SP GR UR REFRACTOMETRY: 1.02 (ref 1–1.03)
UROBILINOGEN UR QL STRIP.AUTO: 1 EU/DL (ref 0.2–1)
WBC # BLD AUTO: 19.7 K/UL (ref 4.6–13.2)
WBC URNS QL MICRO: ABNORMAL /HPF (ref 0–4)

## 2018-09-28 PROCEDURE — 85025 COMPLETE CBC W/AUTO DIFF WBC: CPT

## 2018-09-28 PROCEDURE — 74011250637 HC RX REV CODE- 250/637: Performed by: EMERGENCY MEDICINE

## 2018-09-28 PROCEDURE — 81025 URINE PREGNANCY TEST: CPT

## 2018-09-28 PROCEDURE — 99284 EMERGENCY DEPT VISIT MOD MDM: CPT

## 2018-09-28 PROCEDURE — 74011636320 HC RX REV CODE- 636/320: Performed by: EMERGENCY MEDICINE

## 2018-09-28 PROCEDURE — 96374 THER/PROPH/DIAG INJ IV PUSH: CPT

## 2018-09-28 PROCEDURE — 81001 URINALYSIS AUTO W/SCOPE: CPT

## 2018-09-28 PROCEDURE — 74177 CT ABD & PELVIS W/CONTRAST: CPT

## 2018-09-28 PROCEDURE — 80053 COMPREHEN METABOLIC PANEL: CPT

## 2018-09-28 PROCEDURE — 83690 ASSAY OF LIPASE: CPT

## 2018-09-28 PROCEDURE — 74011250636 HC RX REV CODE- 250/636: Performed by: EMERGENCY MEDICINE

## 2018-09-28 RX ORDER — BUSPIRONE HYDROCHLORIDE 15 MG/1
15 TABLET ORAL
Qty: 90 TAB | Refills: 11 | Status: SHIPPED | OUTPATIENT
Start: 2018-09-28 | End: 2019-10-16 | Stop reason: SDUPTHER

## 2018-09-28 RX ORDER — QUETIAPINE FUMARATE 300 MG/1
600 TABLET, FILM COATED ORAL
Status: CANCELLED | OUTPATIENT
Start: 2018-09-28

## 2018-09-28 RX ORDER — ATENOLOL 50 MG/1
50 TABLET ORAL DAILY
Qty: 30 TAB | Refills: 11 | Status: SHIPPED | OUTPATIENT
Start: 2018-09-28 | End: 2019-09-13 | Stop reason: SDUPTHER

## 2018-09-28 RX ORDER — MIRTAZAPINE 45 MG/1
45 TABLET, FILM COATED ORAL
Qty: 30 TAB | Refills: 11 | Status: SHIPPED | OUTPATIENT
Start: 2018-09-28 | End: 2019-07-29

## 2018-09-28 RX ORDER — GABAPENTIN 300 MG/1
300 CAPSULE ORAL 3 TIMES DAILY
Qty: 90 CAP | Refills: 11 | Status: SHIPPED | OUTPATIENT
Start: 2018-09-28 | End: 2019-04-26 | Stop reason: DRUGHIGH

## 2018-09-28 RX ORDER — DICYCLOMINE HYDROCHLORIDE 10 MG/1
10 CAPSULE ORAL ONCE
Status: COMPLETED | OUTPATIENT
Start: 2018-09-28 | End: 2018-09-28

## 2018-09-28 RX ORDER — ACETAMINOPHEN 325 MG/1
650 TABLET ORAL
Status: COMPLETED | OUTPATIENT
Start: 2018-09-28 | End: 2018-09-28

## 2018-09-28 RX ORDER — DICYCLOMINE HYDROCHLORIDE 10 MG/1
10 CAPSULE ORAL 4 TIMES DAILY
Qty: 20 CAP | Refills: 0 | Status: SHIPPED | OUTPATIENT
Start: 2018-09-28 | End: 2018-10-29 | Stop reason: SDUPTHER

## 2018-09-28 RX ORDER — NITROFURANTOIN 25; 75 MG/1; MG/1
100 CAPSULE ORAL 2 TIMES DAILY
Qty: 14 CAP | Refills: 0 | Status: SHIPPED | OUTPATIENT
Start: 2018-09-28 | End: 2018-10-05

## 2018-09-28 RX ORDER — NITROFURANTOIN 25; 75 MG/1; MG/1
100 CAPSULE ORAL ONCE
Status: COMPLETED | OUTPATIENT
Start: 2018-09-28 | End: 2018-09-28

## 2018-09-28 RX ORDER — ONDANSETRON HYDROCHLORIDE 8 MG/1
8 TABLET, FILM COATED ORAL
Qty: 30 TAB | Refills: 1 | Status: SHIPPED | OUTPATIENT
Start: 2018-09-28 | End: 2019-05-21 | Stop reason: SDUPTHER

## 2018-09-28 RX ORDER — LORAZEPAM 2 MG/ML
1 INJECTION INTRAMUSCULAR
Status: COMPLETED | OUTPATIENT
Start: 2018-09-28 | End: 2018-09-28

## 2018-09-28 RX ORDER — QUETIAPINE FUMARATE 300 MG/1
600 TABLET, FILM COATED ORAL
Qty: 60 TAB | Refills: 11 | Status: SHIPPED | OUTPATIENT
Start: 2018-09-28 | End: 2019-03-20 | Stop reason: DRUGHIGH

## 2018-09-28 RX ADMIN — LORAZEPAM 1 MG: 2 INJECTION, SOLUTION INTRAMUSCULAR; INTRAVENOUS at 21:35

## 2018-09-28 RX ADMIN — IOPAMIDOL 100 ML: 612 INJECTION, SOLUTION INTRAVENOUS at 21:46

## 2018-09-28 RX ADMIN — ACETAMINOPHEN 650 MG: 325 TABLET, FILM COATED ORAL at 22:33

## 2018-09-28 RX ADMIN — DICYCLOMINE HYDROCHLORIDE 10 MG: 10 CAPSULE ORAL at 22:33

## 2018-09-28 RX ADMIN — NITROFURANTOIN (MONOHYDRATE/MACROCRYSTALS) 100 MG: 75; 25 CAPSULE ORAL at 22:33

## 2018-09-28 NOTE — PROGRESS NOTES
Mainor Brooks is a 27 y.o female that is present in the office for a routine appointment for medication evaluation. 1. Have you been to the ER, urgent care clinic since your last visit? Hospitalized since your last visit? No    2. Have you seen or consulted any other health care providers outside of the [address] [place] [address] since your last visit? Include any pap smears or colon screening.  No     Health Maintenance Due   Topic Date Due    DTaP/Tdap/Td series (1 - Tdap) 11/08/2004    PAP AKA CERVICAL CYTOLOGY  11/08/2004

## 2018-09-28 NOTE — PATIENT INSTRUCTIONS
Carpal Tunnel Syndrome: Exercises  Your Care Instructions  Here are some examples of typical rehabilitation exercises for your condition. Start each exercise slowly. Ease off the exercise if you start to have pain. Your doctor or your physical or occupational therapist will tell you when you can start these exercises and which ones will work best for you. Warm-up stretches  When you no longer have pain or numbness, you can do exercises to help prevent carpal tunnel syndrome from coming back. Do not do any stretch or movement that is uncomfortable or painful. 1. Rotate your wrist up, down, and from side to side. Repeat 4 times. 2. Stretch your fingers far apart. Relax them, and then stretch them again. Repeat 4 times. 3. Stretch your thumb by pulling it back gently, holding it, and then releasing it. Repeat 4 times. How to do the exercises  Prayer stretch    1. Start with your palms together in front of your chest just below your chin. 2. Slowly lower your hands toward your waistline, keeping your hands close to your stomach and your palms together until you feel a mild to moderate stretch under your forearms. 3. Hold for at least 15 to 30 seconds. Repeat 2 to 4 times. Wrist flexor stretch    1. Extend your arm in front of you with your palm up. 2. Bend your wrist, pointing your hand toward the floor. 3. With your other hand, gently bend your wrist farther until you feel a mild to moderate stretch in your forearm. 4. Hold for at least 15 to 30 seconds. Repeat 2 to 4 times. Wrist extensor stretch    1. Repeat steps 1 through 4 of the stretch above, but begin with your extended hand palm down. Follow-up care is a key part of your treatment and safety. Be sure to make and go to all appointments, and call your doctor if you are having problems. It's also a good idea to know your test results and keep a list of the medicines you take. Where can you learn more?   Go to http://ascencion-kenzie.info/. Enter P722 in the search box to learn more about \"Carpal Tunnel Syndrome: Exercises. \"  Current as of: November 29, 2017  Content Version: 11.7  © 4598-6205 [employer]. Care instructions adapted under license by [employer] (which disclaims liability or warranty for this information). If you have questions about a medical condition or this instruction, always ask your healthcare professional. [place] [address] any warranty or liability for your use of this information.

## 2018-09-28 NOTE — MR AVS SNAPSHOT
[address] [place] [address] [address] [facility] 83 [id number] 
[phone] Patient: Maude Ramires MRN: [id number] :1985 Visit Information Date & Time Provider Department Dept. Phone Encounter #  
 2018  8:15 AM Jackie Gong, [facility]5 [facility] [phone] [id number] Follow-up Instructions Return in about 1 month (around 10/28/2018) for medication evaluation. Upcoming Health Maintenance Date Due DTaP/Tdap/Td series (1 - Tdap) 2004 PAP AKA CERVICAL CYTOLOGY 2004 Allergies as of 2018  Review Complete On: 2018 By: Bert Merino MD  
  
 Severity Noted Reaction Type Reactions Sulfa (Sulfonamide Antibiotics) High 2018    Anaphylaxis Amoxicillin  2018    Hives Toradol [Ketorolac]  2018    Hives Vistaril [Hydroxyzine Pamoate]  2018    Unknown (comments) Current Immunizations  Reviewed on 2018 Name Date Influenza Vaccine (Quad) PF 2018 Pneumococcal Polysaccharide (PPSV-23) 2018 Not reviewed this visit You Were Diagnosed With   
  
 Codes Comments Tachycardia    -  Primary ICD-10-CM: R00.0 ICD-9-CM: 785.0 Neuropathy     ICD-10-CM: G62.9 ICD-9-CM: 355.9 Anxiety     ICD-10-CM: F41.9 ICD-9-CM: 300.00 Bilateral carpal tunnel syndrome     ICD-10-CM: G56.03 
ICD-9-CM: 354.0 Arnold-Chiari malformation (HCC)     ICD-10-CM: Q07.00 ICD-9-CM: 741.00 Nausea     ICD-10-CM: R11.0 ICD-9-CM: 787.02 Anal fissure and fistula     ICD-10-CM: K60.2, K60.3 ICD-9-CM: 565.0, 565.1 Vitals BP Pulse Temp Resp Height(growth percentile) Weight(growth percentile) 119/73 (BP 1 Location: Right arm, BP Patient Position: Sitting) 98 98.1 °F (36.7 °C) (Oral) 17 5' 2\" (1.575 m) 153 lb 9.6 oz (69.7 kg) SpO2 BMI OB Status Smoking Status 98% 28.09 kg/m2 Hysterectomy Current Every Day Smoker Vitals History BMI and BSA Data Body Mass Index Body Surface Area 28.09 kg/m 2 1.75 m 2 Preferred Pharmacy Pharmacy Name Phone [facility] - [address], [address] [employer] [phone] Your Updated Medication List  
  
   
This list is accurate as of 9/28/18  8:28 AM.  Always use your most recent med list.  
  
  
  
  
 acyclovir 400 mg tablet Commonly known as:  ZOVIRAX Take 1 Tab by mouth every four (4) hours (while awake). albuterol sulfate 90 mcg/actuation Aepb Commonly known as:  Sheran Gallon Take 2 Puffs by inhalation every four (4) hours as needed. atenolol 50 mg tablet Commonly known as:  TENORMIN Take 1 Tab by mouth daily. baclofen 20 mg tablet Commonly known as:  LIORESAL Take 1 Tab by mouth three (3) times daily. budesonide-formoterol 160-4.5 mcg/actuation Hfaa Commonly known as:  SYMBICORT Take 2 Puffs by inhalation two (2) times a day. busPIRone 15 mg tablet Commonly known as:  BUSPAR Take 1 Tab by mouth three (3) times daily as needed. gabapentin 300 mg capsule Commonly known as:  NEURONTIN Take 1 Cap by mouth three (3) times daily. hydrocortisone 2.5 % rectal cream  
Commonly known as:  PROCTOSOL HC Insert  into rectum three (3) times daily as needed for Hemorrhoids. mirtazapine 45 mg tablet Commonly known as:  Beulah Flynn Take 1 Tab by mouth nightly. ondansetron hcl 8 mg tablet Commonly known as:  Robyn Dice Take 1 Tab by mouth every eight (8) hours as needed for Nausea. QUEtiapine 300 mg tablet Commonly known as:  SEROquel Take 2 Tabs by mouth nightly. sertraline 100 mg tablet Commonly known as:  ZOLOFT Take 2 Tabs by mouth daily. Prescriptions Sent to Pharmacy Refills  
 mirtazapine (REMERON) 45 mg tablet 11 Sig: Take 1 Tab by mouth nightly. Class: Normal  
 Pharmacy: [address], [address] Ph #: [phone] Route: Oral  
 ondansetron hcl (ZOFRAN) 8 mg tablet 1 Sig: Take 1 Tab by mouth every eight (8) hours as needed for Nausea. Class: Normal  
 Pharmacy: [facility], [address] Ph #: [phone] Route: Oral  
 atenolol (TENORMIN) 50 mg tablet 11 Sig: Take 1 Tab by mouth daily. Class: Normal  
 Pharmacy: [facility] [address] [facility] [address], [address] Ph #: [phone] Route: Oral  
 gabapentin (NEURONTIN) 300 mg capsule 11 Sig: Take 1 Cap by mouth three (3) times daily. Class: Normal  
 [facility] [address] Ph #: [phone] Route: Oral  
 busPIRone (BUSPAR) 15 mg tablet 11 Sig: Take 1 Tab by mouth three (3) times daily as needed. Class: Normal  
 Pharmacy: [address] [place], [place] [address]aaron [facility], [address] Ph #: [phone] Route: Oral  
 QUEtiapine (SEROQUEL) 300 mg tablet 11 Sig: Take 2 Tabs by mouth nightly. Class: Normal  
 [facility], [address] Ph #: [phone] Route: Oral  
  
We Performed the Following REFERRAL TO NEUROLOGY [UEX94 Custom] Comments:  
 Please evaluate patient for arnold chiari malformation and neuropathy. 1st available is fine. Follow-up Instructions Return in about 1 month (around 10/28/2018) for medication evaluation. To-Do List   
 10/04/2018 12:45 PM  
  Appointment with [facility] CT RM 2 at [facility] CT ([phone]) ORAL CONTRAST PREP   2 [facility] from radiology  no later than 24 hours prior to study date and time. DIET RESTRICTIONS  Nothing to eat 4 hours prior to study Drink plenty of clear liquids May to take meds May drink oral contrast if directed  GENERAL INSTRUCTIONS  If you were given premedications for IV contrast to take prior to having your study, please arrange to have someone drive you to your appointment.   If you have had a creatinine level drawn within the past 30 days, please bring most recent results to your appt. MEDICATIONS  Bring a complete list of all medications you are currently taking to include prescriptions, over-the-counter meds, herbals, vitamins & any dietary supplements. RELATED STUDY INFORMATION  Bring any films, CD's, and reports related with you on the day of your exam.  This only includes studies done outside of [address] [place] [address], [facility] [place], Erika, and Juan Miguel. QUESTIONS  Notify the CT Department if you have any questions concerning your study. [place] - [phone] [facility] - [phone] Referral Information Referral ID Referred By Referred To  
  
 [id number] Cheyenne ORTEGA MD   
   [address] [facility] [address] [address] Phone: [phone] Fax: [phone] Visits Status Start Date End Date 1 New Request 9/28/18 9/28/19 If your referral has a status of pending review or denied, additional information will be sent to support the outcome of this decision. Patient Instructions Carpal Tunnel Syndrome: Exercises Your Care Instructions Here are some examples of typical rehabilitation exercises for your condition. Start each exercise slowly. Ease off the exercise if you start to have pain. Your doctor or your physical or occupational therapist will tell you when you can start these exercises and which ones will work best for you. Warm-up stretches When you no longer have pain or numbness, you can do exercises to help prevent carpal tunnel syndrome from coming back. Do not do any stretch or movement that is uncomfortable or painful. 1. Rotate your wrist up, down, and from side to side. Repeat 4 times. 2. Stretch your fingers far apart. Relax them, and then stretch them again. Repeat 4 times. 3. Stretch your thumb by pulling it back gently, holding it, and then releasing it. Repeat 4 times. How to do the exercises Prayer stretch 1. Start with your palms together in front of your chest just below your chin. 2. Slowly lower your hands toward your waistline, keeping your hands close to your stomach and your palms together until you feel a mild to moderate stretch under your forearms. 3. Hold for at least 15 to 30 seconds. Repeat 2 to 4 times. Wrist flexor stretch 1. Extend your arm in front of you with your palm up. 2. Bend your wrist, pointing your hand toward the floor. 3. With your other hand, gently bend your wrist farther until you feel a mild to moderate stretch in your forearm. 4. Hold for at least 15 to 30 seconds. Repeat 2 to 4 times. Wrist extensor stretch 1. Repeat steps 1 through 4 of the stretch above, but begin with your extended hand palm down. Follow-up care is a key part of your treatment and safety. Be sure to make and go to all appointments, and call your doctor if you are having problems. It's also a good idea to know your test results and keep a list of the medicines you take. Where can you learn more? Go to http://ascencion-kenzie.info/. Enter N722 in the search box to learn more about \"Carpal Tunnel Syndrome: Exercises. \" Current as of: November 29, 2017 Content Version: 11.7 © 5286-3141 [employer]. Care instructions adapted under license by [employer] (which disclaims liability or warranty for this information). If you have questions about a medical condition or this instruction, always ask your healthcare professional. [place] [address] any warranty or liability for your use of this information. Introducing [facility]! Amy Goncalves introduces [employer] patient portal. Now you can access parts of your medical record, email your doctor's office, and request medication refills online. 1. In your internet browser, go to [email or web address] 2. Click on the First Time User? Click Here link in the Sign In box. You will see the New Member Sign Up page. 3. Enter your [employer] Access Code exactly as it appears below. You will not need to use this code after youve completed the sign-up process. If you do not sign up before the expiration date, you must request a new code. · [employer] Access Code: [email or web address] Expires: 10/7/2018  5:35 PM 
 
4. Enter the last four digits of your Social Security Number (xxxx) and Date of Birth (mm/dd/yyyy) as indicated and click Submit. You will be taken to the next sign-up page. 5. Create a [employer] ID. This will be your [employer] login ID and cannot be changed, so think of one that is secure and easy to remember. 6. Create a [employer] password. You can change your password at any time. 7. Enter your Password Reset Question and Answer. This can be used at a later time if you forget your password. 8. Enter your e-mail address. You will receive e-mail notification when new information is available in [address] 9. Click Sign Up. You can now view and download portions of your medical record. 10. Click the Download Summary menu link to download a portable copy of your medical information. If you have questions, please visit the Frequently Asked Questions section of the [employer] website. Remember, [employer] is NOT to be used for urgent needs. For medical emergencies, dial 911. Now available from your iPhone and Android! Please provide this summary of care documentation to your next provider. Your primary care clinician is listed as Jt Kingston. If you have any questions after today's visit, please call [phone].

## 2018-09-28 NOTE — PROGRESS NOTES
Lina Paredes is a 27 y.o.  female and presents with    Chief Complaint   Patient presents with    Medication Evaluation     Subjective:  Ms. Claritza Nieves presents with her boyfriend for follow up medications. She was evaluated by dr. Dorian Batres and will undergo imaging. Osteoarthritis and Chronic Pain:  Patient has carpal tunnel and arnold chiari syndrome, primarily affecting the neck and arms. Symptoms onset: problem is longstanding. Rheumatological ROS: ongoing significant pain in neck and arms which is stable and controlled by PRN meds. Response to treatment plan: improved with gabapentin but requesting increase in dose. Anxiety Review:  Patient is seen for anxiety disorder with situation anxiety when her boyfriend is at work. Current treatment includes seroquel, Zoloft, mirtazapine and no other therapies. Ongoing symptoms include: palpitations, paresthesias, insomnia, racing thoughts, psychomotor agitation, feelings of losing control, difficulty concentrating. Patient denies: suicidal ideation. Reported side effects from the treatment: none.     There is no problem list on file for this patient. There are no active problems to display for this patient. Current Outpatient Prescriptions   Medication Sig Dispense Refill    mirtazapine (REMERON) 45 mg tablet Take 45 mg by mouth nightly.  hydrocortisone (PROCTOSOL HC) 2.5 % rectal cream Insert  into rectum three (3) times daily as needed for Hemorrhoids. 30 g 2    busPIRone (BUSPAR) 7.5 mg tablet Take 1 Tab by mouth three (3) times daily as needed. 90 Tab 1    baclofen (LIORESAL) 20 mg tablet Take 1 Tab by mouth three (3) times daily. 90 Tab 11    acyclovir (ZOVIRAX) 400 mg tablet Take 1 Tab by mouth every four (4) hours (while awake). 40 Tab 1    sertraline (ZOLOFT) 100 mg tablet Take 2 Tabs by mouth daily. 60 Tab 11    budesonide-formoterol (SYMBICORT) 160-4.5 mcg/actuation HFAA Take 2 Puffs by inhalation two (2) times a day.  1 Inhaler 2    gabapentin (NEURONTIN) 100 mg capsule Take 1 Cap by mouth three (3) times daily. 90 Cap 2    atenolol (TENORMIN) 50 mg tablet Take 1 Tab by mouth daily. 14 Tab 0    albuterol sulfate (PROAIR RESPICLICK) 90 mcg/actuation aepb Take 2 Puffs by inhalation every four (4) hours as needed. 1 Inhaler 0    ondansetron hcl (ZOFRAN) 8 mg tablet Take 1 Tab by mouth every eight (8) hours as needed for Nausea for up to 12 doses. 12 Tab 0    QUEtiapine (SEROQUEL) 300 mg tablet Take 600 mg by mouth nightly.        Allergies   Allergen Reactions    Sulfa (Sulfonamide Antibiotics) Anaphylaxis    Amoxicillin Hives    Toradol [Ketorolac] Hives    Vistaril [Hydroxyzine Pamoate] Unknown (comments)     Past Medical History:   Diagnosis Date    Anal fissure     Hep C w/o coma, chronic (HCC)     Methamphetamine abuse in remission     Schizo-affective psychosis ([facility] [address]     Tachycardia      Past Surgical History:   Procedure Laterality Date    HX APPENDECTOMY      HX CHOLECYSTECTOMY      HX COLONOSCOPY  2014    HX HYSTERECTOMY      HX OTHER SURGICAL      right middle lobe wedge resection     Family History   Problem Relation Age of Onset    Cancer Mother 62     lung    Alcohol abuse Mother     Heart Disease Father     Alcohol abuse Father      Social History   Substance Use Topics    Smoking status: Current Every Day Smoker     Packs/day: 1.50     Years: 20.00     Types: Cigarettes    Smokeless tobacco: Never Used    Alcohol use No       ROS   General ROS: negative for - chills or fever  Ophthalmic ROS: negative for - blurry vision  ENT ROS: negative for - headaches  Endocrine ROS: negative for - polydipsia/polyuria or temperature intolerance  Gastrointestinal ROS: positive for - fissure; diarrhea  Genito-Urinary ROS: no dysuria, trouble voiding, or hematuria  Neurological ROS: negative for - numbness/tingling  Dermatological ROS: negative for - rash or skin lesion changes    All other systems reviewed and are negative. Objective:  Vitals:    09/28/18 0756   BP: 119/73   Pulse: 98   Resp: 17   Temp: 98.1 °F (36.7 °C)   TempSrc: Oral   SpO2: 98%   Weight: 153 lb 9.6 oz (69.7 kg)   Height: 5' 2\" (1.575 m)   PainSc:   6   PainLoc: Abdomen     alert, well appearing, and in no distress, oriented to person, place, and time and normal appearing weight  Mental status - anxious  Mouth - poor dentition  Chest - clear to auscultation, no wheezes, rales or rhonchi, symmetric air entry  Heart - normal rate, regular rhythm, normal S1, S2, no murmurs, rubs, clicks or gallops  Extremities - peripheral pulses normal, no pedal edema, no clubbing or cyanosis  Skin - normal coloration and turgor, no rashes, no suspicious skin lesions noted    Assessment/Plan:    1. Neuropathy  Refer to neurology; increase gabapentin for better control of symptoms  - gabapentin (NEURONTIN) 300 mg capsule; Take 1 Cap by mouth three (3) times daily. Dispense: 90 Cap; Refill: 11    2. Anxiety  Symptoms improved but with ongoing difficulty; increase dose of buspirone  - mirtazapine (REMERON) 45 mg tablet; Take 1 Tab by mouth nightly. Dispense: 30 Tab; Refill: 11  - busPIRone (BUSPAR) 15 mg tablet; Take 1 Tab by mouth three (3) times daily as needed. Dispense: 90 Tab; Refill: 11  - QUEtiapine (SEROQUEL) 300 mg tablet; Take 2 Tabs by mouth nightly. Dispense: 60 Tab; Refill: 11    3. Tachycardia  Associated with anxiety; continue atenolol  - atenolol (TENORMIN) 50 mg tablet; Take 1 Tab by mouth daily. Dispense: 30 Tab; Refill: 11    4. Bilateral carpal tunnel syndrome  Continue gabapentin; recommend wrist brace     5. Arnold-Chiari malformation (HCC)    - REFERRAL TO NEUROLOGY    6. Nausea    - ondansetron hcl (ZOFRAN) 8 mg tablet; Take 1 Tab by mouth every eight (8) hours as needed for Nausea. Dispense: 30 Tab;  Refill: 1      Lab review: no lab studies available for review at time of visit      I have discussed the diagnosis with the patient and the intended plan as seen in the above orders. The patient has received an after-visit summary and questions were answered concerning future plans. I have discussed medication side effects and warnings with the patient as well. I have reviewed the plan of care with the patient, accepted their input and they are in agreement with the treatment goals. Follow-up Disposition:  Return in about 1 month (around 10/28/2018) for medication evaluation.

## 2018-09-29 NOTE — ED NOTES
I performed a brief evaluation, including history and physical, of the patient here in triage and I have determined that pt will need further treatment and evaluation from the main side ER physician. I have placed initial orders to help in expediting patients care. September 28, 2018 at 8:29 PM - Kathya Armijo PA-C There were no vitals taken for this visit.

## 2018-09-29 NOTE — DISCHARGE INSTRUCTIONS
Abdominal Pain: Care Instructions  Your Care Instructions    Abdominal pain has many possible causes. Some aren't serious and get better on their own in a few days. Others need more testing and treatment. If your pain continues or gets worse, you need to be rechecked and may need more tests to find out what is wrong. You may need surgery to correct the problem. Don't ignore new symptoms, such as fever, nausea and vomiting, urination problems, pain that gets worse, and dizziness. These may be signs of a more serious problem. Your doctor may have recommended a follow-up visit in the next 8 to 12 hours. If you are not getting better, you may need more tests or treatment. The doctor has checked you carefully, but problems can develop later. If you notice any problems or new symptoms, get medical treatment right away. Follow-up care is a key part of your treatment and safety. Be sure to make and go to all appointments, and call your doctor if you are having problems. It's also a good idea to know your test results and keep a list of the medicines you take. How can you care for yourself at home? · Rest until you feel better. · To prevent dehydration, drink plenty of fluids, enough so that your urine is light yellow or clear like water. Choose water and other caffeine-free clear liquids until you feel better. If you have kidney, heart, or liver disease and have to limit fluids, talk with your doctor before you increase the amount of fluids you drink. · If your stomach is upset, eat mild foods, such as rice, dry toast or crackers, bananas, and applesauce. Try eating several small meals instead of two or three large ones. · Wait until 48 hours after all symptoms have gone away before you have spicy foods, alcohol, and drinks that contain caffeine. · Do not eat foods that are high in fat. · Avoid anti-inflammatory medicines such as aspirin, ibuprofen (Advil, Motrin), and naproxen (Aleve).  These can cause stomach upset. Talk to your doctor if you take daily aspirin for another health problem. When should you call for help? Call 911 anytime you think you may need emergency care. For example, call if:    · You passed out (lost consciousness).     · You pass maroon or very bloody stools.     · You vomit blood or what looks like coffee grounds.     · You have new, severe belly pain.    Call your doctor now or seek immediate medical care if:    · Your pain gets worse, especially if it becomes focused in one area of your belly.     · You have a new or higher fever.     · Your stools are black and look like tar, or they have streaks of blood.     · You have unexpected vaginal bleeding.     · You have symptoms of a urinary tract infection. These may include:  ¨ Pain when you urinate. ¨ Urinating more often than usual.  ¨ Blood in your urine.     · You are dizzy or lightheaded, or you feel like you may faint.    Watch closely for changes in your health, and be sure to contact your doctor if:    · You are not getting better after 1 day (24 hours). Where can you learn more? Go to http://ascencionPlaydekkenzie.info/. Enter R907 in the search box to learn more about \"Abdominal Pain: Care Instructions. \"  Current as of: November 20, 2017  Content Version: 11.7  © 0521-8658 [employer]. Care instructions adapted under license by [employer] (which disclaims liability or warranty for this information). If you have questions about a medical condition or this instruction, always ask your healthcare professional. [place] [address] any warranty or liability for your use of this information. Urinary Tract Infection in Women: Care Instructions  Your Care Instructions    A urinary tract infection, or UTI, is a general term for an infection anywhere between the kidneys and the urethra (where urine comes out). Most UTIs are bladder infections.  They often cause pain or burning when you urinate. UTIs are caused by bacteria and can be cured with antibiotics. Be sure to complete your treatment so that the infection goes away. Follow-up care is a key part of your treatment and safety. Be sure to make and go to all appointments, and call your doctor if you are having problems. It's also a good idea to know your test results and keep a list of the medicines you take. How can you care for yourself at home? · Take your antibiotics as directed. Do not stop taking them just because you feel better. You need to take the full course of antibiotics. · Drink extra water and other fluids for the next day or two. This may help wash out the bacteria that are causing the infection. (If you have kidney, heart, or liver disease and have to limit fluids, talk with your doctor before you increase your fluid intake.)  · Avoid drinks that are carbonated or have caffeine. They can irritate the bladder. · Urinate often. Try to empty your bladder each time. · To relieve pain, take a hot bath or lay a heating pad set on low over your lower belly or genital area. Never go to sleep with a heating pad in place. To prevent UTIs  · Drink plenty of water each day. This helps you urinate often, which clears bacteria from your system. (If you have kidney, heart, or liver disease and have to limit fluids, talk with your doctor before you increase your fluid intake.)  · Urinate when you need to. · Urinate right after you have sex. · Change sanitary pads often. · Avoid douches, bubble baths, feminine hygiene sprays, and other feminine hygiene products that have deodorants. · After going to the bathroom, wipe from front to back. When should you call for help? Call your doctor now or seek immediate medical care if:    · Symptoms such as fever, chills, nausea, or vomiting get worse or appear for the first time.     · You have new pain in your back just below your rib cage.  This is called flank pain.     · There is new blood or pus in your urine.     · You have any problems with your antibiotic medicine.    Watch closely for changes in your health, and be sure to contact your doctor if:    · You are not getting better after taking an antibiotic for 2 days.     · Your symptoms go away but then come back. Where can you learn more? Go to http://ascencion-kenzie.info/. Enter Z743 in the search box to learn more about \"Urinary Tract Infection in Women: Care Instructions. \"  Current as of: May 12, 2017  Content Version: 11.7  © 0178-6233 [employer]. Care instructions adapted under license by [employer] (which disclaims liability or warranty for this information). If you have questions about a medical condition or this instruction, always ask your healthcare professional. [address] any warranty or liability for your use of this information.

## 2018-09-29 NOTE — ED PROVIDER NOTES
EMERGENCY DEPARTMENT HISTORY AND PHYSICAL EXAM 
 
9:07 PM 
 
 
Date: 9/28/2018 Patient Name: Mingo Flores History of Presenting Illness Chief Complaint Patient presents with  Abdominal Pain History Provided By: Patient Chief Complaint: abd pain Duration:  2 weeks Timing:  Constant Location: diffuse Quality: Jetty Dense Severity: 7 out of 10 Modifying Factors: She had a hysterectomy and says every time she coughs, it feels like something is there (vagina). She took mag citrate and Zofran which helped her nausea and her constipation. Dr. Krupa Cervantes examined her and could not tell exactly what was wrong with her. Associated Symptoms: constipation and nausea. Additional History (Context): Mingo Flores is a 27 y.o. female with No significant past medical history who presents with constant sharp 7/10 diffuse abd pan that started 2 weeks ago. Pt smokes 2 packs a day. Pt is in recovery for methamphetamines. She had a hysterectomy and says every time she coughs, it feels like something is there (vagina). She took mag citrate and Zofran which helped her nausea and her constipation. Dr. Krupa Cervantes examined her and could not tell exactly what was wrong with her. Associated sx are constipation and nausea. Denies dysuria and hematuria. Pt smokes and does not drink. PCP: Jt Kingston MD 
 
Current Outpatient Prescriptions Medication Sig Dispense Refill  nitrofurantoin, macrocrystal-monohydrate, (MACROBID) 100 mg capsule Take 1 Cap by mouth two (2) times a day for 7 days. 14 Cap 0  
 dicyclomine (BENTYL) 10 mg capsule Take 1 Cap by mouth four (4) times daily for 5 days. 20 Cap 0  
 mirtazapine (REMERON) 45 mg tablet Take 1 Tab by mouth nightly. 30 Tab 11  
 ondansetron hcl (ZOFRAN) 8 mg tablet Take 1 Tab by mouth every eight (8) hours as needed for Nausea. 30 Tab 1  
 atenolol (TENORMIN) 50 mg tablet Take 1 Tab by mouth daily.  30 Tab 11  
  gabapentin (NEURONTIN) 300 mg capsule Take 1 Cap by mouth three (3) times daily. 90 Cap 11  
 busPIRone (BUSPAR) 15 mg tablet Take 1 Tab by mouth three (3) times daily as needed. 90 Tab 11  
 QUEtiapine (SEROQUEL) 300 mg tablet Take 2 Tabs by mouth nightly. 60 Tab 11  
 hydrocortisone (PROCTOSOL HC) 2.5 % rectal cream Insert  into rectum three (3) times daily as needed for Hemorrhoids. 30 g 2  
 baclofen (LIORESAL) 20 mg tablet Take 1 Tab by mouth three (3) times daily. 90 Tab 11  
 acyclovir (ZOVIRAX) 400 mg tablet Take 1 Tab by mouth every four (4) hours (while awake). 40 Tab 1  
 sertraline (ZOLOFT) 100 mg tablet Take 2 Tabs by mouth daily. 60 Tab 11  
 budesonide-formoterol (SYMBICORT) 160-4.5 mcg/actuation HFAA Take 2 Puffs by inhalation two (2) times a day. 1 Inhaler 2  
 albuterol sulfate (PROAIR RESPICLICK) 90 mcg/actuation aepb Take 2 Puffs by inhalation every four (4) hours as needed. 1 Inhaler 0 Past History Past Medical History: 
Past Medical History:  
Diagnosis Date  Anal fissure  Hep C w/o coma, chronic (HCC)  Methamphetamine abuse in remission  Schizo-affective psychosis ([facility] [address]  Tachycardia Past Surgical History: 
Past Surgical History:  
Procedure Laterality Date  HX APPENDECTOMY  HX CHOLECYSTECTOMY  HX COLONOSCOPY  2014  HX HYSTERECTOMY  HX OTHER SURGICAL    
 right middle lobe wedge resection Family History: 
Family History Problem Relation Age of Onset  Cancer Mother 62  
  lung  Alcohol abuse Mother  Heart Disease Father  Alcohol abuse Father Social History: 
Social History Substance Use Topics  Smoking status: Current Every Day Smoker Packs/day: 1.50 Years: 20.00 Types: Cigarettes  Smokeless tobacco: Never Used  Alcohol use No  
 
 
Allergies: Allergies Allergen Reactions  Sulfa (Sulfonamide Antibiotics) Anaphylaxis  Amoxicillin Hives  Toradol [Ketorolac] Hives  Vistaril [Hydroxyzine Pamoate] Unknown (comments) Review of Systems Review of Systems Gastrointestinal: Positive for abdominal pain, constipation and nausea. Genitourinary: Negative for dysuria and hematuria. All other systems reviewed and are negative. Physical Exam  
 
Visit Vitals  BP (!) 116/97  Pulse 99  Temp 99.1 °F (37.3 °C)  Resp 21  
 Ht 5' 2\" (1.575 m)  Wt 69.4 kg (153 lb)  SpO2 95%  BMI 27.98 kg/m2 Physical Exam  
Constitutional: She is oriented to person, place, and time. She appears well-developed and well-nourished. HENT:  
Head: Normocephalic and atraumatic. Neck: Neck supple. No JVD present. Cardiovascular: Normal rate and regular rhythm. Pulmonary/Chest: Effort normal and breath sounds normal. No respiratory distress. Abdominal: Soft. She exhibits distension (mild). There is tenderness (mild generalized). There is no rebound and no guarding. Genitourinary:  
Genitourinary Comments: No vaginal lesions, no rectal mass palpable No visible prolapse Musculoskeletal: She exhibits no edema. Neurological: She is alert and oriented to person, place, and time. Skin: Skin is warm and dry. No erythema. Psychiatric: Judgment normal.  
 
 
 
Diagnostic Study Results Labs - Recent Results (from the past 12 hour(s)) URINALYSIS W/ RFLX MICROSCOPIC Collection Time: 09/28/18  8:33 PM  
Result Value Ref Range Color DARK YELLOW Appearance TURBID Specific gravity 1.022 1.005 - 1.030    
 pH (UA) 6.5 5.0 - 8.0 Protein 30 (A) NEG mg/dL Glucose NEGATIVE  NEG mg/dL Ketone TRACE (A) NEG mg/dL Bilirubin NEGATIVE  NEG Blood NEGATIVE  NEG Urobilinogen 1.0 0.2 - 1.0 EU/dL Nitrites NEGATIVE  NEG Leukocyte Esterase MODERATE (A) NEG    
HCG URINE, QL Collection Time: 09/28/18  8:33 PM  
Result Value Ref Range HCG urine, QL NEGATIVE  NEG    
URINE MICROSCOPIC ONLY Collection Time: 09/28/18  8:33 PM  
Result Value Ref Range WBC 36 to 50 0 - 4 /hpf  
 RBC NONE 0 - 5 /hpf Epithelial cells 4+ 0 - 5 /lpf Bacteria 3+ (A) NEG /hpf  
CBC WITH AUTOMATED DIFF Collection Time: 09/28/18  8:53 PM  
Result Value Ref Range WBC 19.7 (H) 4.6 - 13.2 K/uL  
 RBC 4.19 (L) 4.20 - 5.30 M/uL  
 HGB 14.2 12.0 - 16.0 g/dL HCT 39.6 35.0 - 45.0 % MCV 94.5 74.0 - 97.0 FL  
 MCH 33.9 24.0 - 34.0 PG  
 MCHC 35.9 31.0 - 37.0 g/dL  
 RDW 13.0 11.6 - 14.5 % PLATELET 538 060 - 377 K/uL MPV 9.0 (L) 9.2 - 11.8 FL  
 NEUTROPHILS 80 (H) 40 - 73 % LYMPHOCYTES 12 (L) 21 - 52 % MONOCYTES 7 3 - 10 % EOSINOPHILS 1 0 - 5 % BASOPHILS 0 0 - 2 %  
 ABS. NEUTROPHILS 15.6 (H) 1.8 - 8.0 K/UL  
 ABS. LYMPHOCYTES 2.3 0.9 - 3.6 K/UL  
 ABS. MONOCYTES 1.5 (H) 0.05 - 1.2 K/UL  
 ABS. EOSINOPHILS 0.2 0.0 - 0.4 K/UL  
 ABS. BASOPHILS 0.0 0.0 - 0.1 K/UL  
 DF AUTOMATED METABOLIC PANEL, COMPREHENSIVE Collection Time: 09/28/18  8:53 PM  
Result Value Ref Range Sodium 140 136 - 145 mmol/L Potassium 3.7 3.5 - 5.5 mmol/L Chloride 106 100 - 108 mmol/L  
 CO2 25 21 - 32 mmol/L Anion gap 9 3.0 - 18 mmol/L Glucose 85 74 - 99 mg/dL BUN 7 7.0 - 18 MG/DL Creatinine 0.82 0.6 - 1.3 MG/DL  
 BUN/Creatinine ratio 9 (L) 12 - 20 GFR est AA >60 >60 ml/min/1.73m2 GFR est non-AA >60 >60 ml/min/1.73m2 Calcium 8.5 8.5 - 10.1 MG/DL Bilirubin, total 0.3 0.2 - 1.0 MG/DL  
 ALT (SGPT) 19 13 - 56 U/L  
 AST (SGOT) 17 15 - 37 U/L Alk. phosphatase 92 45 - 117 U/L Protein, total 7.7 6.4 - 8.2 g/dL Albumin 3.9 3.4 - 5.0 g/dL Globulin 3.8 2.0 - 4.0 g/dL A-G Ratio 1.0 0.8 - 1.7 LIPASE Collection Time: 09/28/18  8:53 PM  
Result Value Ref Range Lipase 120 73 - 393 U/L Radiologic Studies -  
CT ABD PELV W CONT    (Results Pending)  
 
-No acute findings to explain abd pain 
-no bowel obstruction. Appendectomy, cholecystectomy, hysterectomy. -Minimal patchy ground glass opacities at the lung bases. Improved from prior Medical Decision Making I am the first provider for this patient. I reviewed the vital signs, available nursing notes, past medical history, past surgical history, family history and social history. Vital Signs-Reviewed the patient's vital signs. Pulse Oximetry Analysis -  97% on room air Records Reviewed: Nursing Notes (Time of Review: 9:07 PM) ED Course: Progress Notes, Reevaluation, and Consults: 
 
 
Provider Notes (Medical Decision Making): 26 y/o female presents with abd pain and distention, reportedly questionable rectal mass, will check labs and ct Not consistent with torsion, psh of hysterectomy Discussed results with pt, stable for dc home. PCP and gen surgery follow up. Will treat UTI, bentyl for abd pain. Discussed return precautions. No signs of pyelo at this time. Diagnosis Clinical Impression: 1. Acute UTI 2. Abdominal cramping Disposition: HOME Follow-up Information Follow up With Details Comments Contact Info Romero Brown MD Go [place] 2 [place] [id number] [facility] [address] [id number] [address] [address] 
[phone] Samule Sever, MD Go in 2 days [place] Follow up [address] [facility] [address] [facility] [address] [id number] [phone] [facility] EMERGENCY DEPT Go to As needed, If symptoms worsen [address] 
[phone] Discharge Medication List as of 9/28/2018 10:31 PM  
  
START taking these medications Details  
nitrofurantoin, macrocrystal-monohydrate, (MACROBID) 100 mg capsule Take 1 Cap by mouth two (2) times a day for 7 days. , Print, Disp-14 Cap, R-0  
  
dicyclomine (BENTYL) 10 mg capsule Take 1 Cap by mouth four (4) times daily for 5 days. , Print, Disp-20 Cap, R-0  
  
  
CONTINUE these medications which have NOT CHANGED Details mirtazapine (REMERON) 45 mg tablet Take 1 Tab by mouth nightly., Normal, Disp-30 Tab, R-11  
  
ondansetron hcl (ZOFRAN) 8 mg tablet Take 1 Tab by mouth every eight (8) hours as needed for Nausea., Normal, Disp-30 Tab, R-1  
  
atenolol (TENORMIN) 50 mg tablet Take 1 Tab by mouth daily. , Normal, Disp-30 Tab, R-11  
  
gabapentin (NEURONTIN) 300 mg capsule Take 1 Cap by mouth three (3) times daily. , NormalDiscontinue previous doseDisp-90 Cap, R-11  
  
busPIRone (BUSPAR) 15 mg tablet Take 1 Tab by mouth three (3) times daily as needed., NormalDiscontinue previous orderDisp-90 Tab, R-11 QUEtiapine (SEROQUEL) 300 mg tablet Take 2 Tabs by mouth nightly., Normal, Disp-60 Tab, R-11  
  
hydrocortisone (PROCTOSOL HC) 2.5 % rectal cream Insert  into rectum three (3) times daily as needed for Hemorrhoids. , Normal, Disp-30 g, R-2  
  
baclofen (LIORESAL) 20 mg tablet Take 1 Tab by mouth three (3) times daily. , Normal, Disp-90 Tab, R-11  
  
acyclovir (ZOVIRAX) 400 mg tablet Take 1 Tab by mouth every four (4) hours (while awake). , Normal, Disp-40 Tab, R-1  
  
sertraline (ZOLOFT) 100 mg tablet Take 2 Tabs by mouth daily. , Normal, Disp-60 Tab, R-11  
  
budesonide-formoterol (SYMBICORT) 160-4.5 mcg/actuation HFAA Take 2 Puffs by inhalation two (2) times a day., Normal, Disp-1 Inhaler, R-2  
  
albuterol sulfate (PROAIR RESPICLICK) 90 mcg/actuation aepb Take 2 Puffs by inhalation every four (4) hours as needed. , Print, Disp-1 Inhaler, R-0  
  
  
 
_______________________________ Attestations: 
Scribe Attestation Emir Woods acting as a scribe for and in the presence of Manuel Singh DO September 28, 2018 at 9:07 PM 
    
Provider Attestation:     
I personally performed the services described in the documentation, reviewed the documentation, as recorded by the scribe in my presence, and it accurately and completely records my words and actions.  September 28, 2018 at 9:07 PM - Manuel Singh DO   
 _______________________________

## 2018-09-29 NOTE — ED NOTES
I have reviewed discharge instructions with the patient. The patient verbalized understanding. Patient armband removed and shredded. Pt ambulated out of the ED with . 2 prescription given to pt. Pt  stated he will drive home.

## 2018-10-01 ENCOUNTER — TELEPHONE (OUTPATIENT)
Dept: SURGERY | Age: 33
End: 2018-10-01

## 2018-10-01 NOTE — TELEPHONE ENCOUNTER
Patient called to see if Dr. Ramiro Rudd can speak to her on the phone in regards to her CT scan done in the ED on 9.28.18. Offered patient to come into the office to see Dr. Ramiro Rudd today 10.1 at 1300. Patient verbalized ok, then advised that her  is not home and she would feel more comfortable if he was with her and he doesn't get off until 1500. She wants to speak to Dr. Ramiro Rudd over the phone. Advised patient we will call her back Dr Ramiro Rudd is in office. Patient verbalized understanding. Call back [phone].

## 2018-10-01 NOTE — TELEPHONE ENCOUNTER
Returned patient's call from earlier today with questions regarding CT scan done over the weekend in the ED. She is also experiencing constipation and needs advice for treating that. Per Dr. Cristian Pittman, the CT scan was negative. Patient verbalized understanding. Advised patient that per Dr. Cristian Pittman she should use miralax daily with plenty of additional water or MOM as needed for constipation. Patient verbalized understanding. Dr. Cristian Pittman would like to see patient back in the office in a few weeks. Patient declined to make the appt at the time of the call stating that she will call back next week.

## 2018-10-15 ENCOUNTER — APPOINTMENT (OUTPATIENT)
Dept: GENERAL RADIOLOGY | Age: 33
End: 2018-10-15
Attending: EMERGENCY MEDICINE
Payer: MEDICAID

## 2018-10-15 ENCOUNTER — HOSPITAL ENCOUNTER (EMERGENCY)
Age: 33
Discharge: HOME | End: 2018-10-15
Attending: EMERGENCY MEDICINE
Payer: MEDICAID

## 2018-10-15 VITALS
RESPIRATION RATE: 20 BRPM | HEART RATE: 95 BPM | TEMPERATURE: 98.6 F | OXYGEN SATURATION: 96 % | SYSTOLIC BLOOD PRESSURE: 112 MMHG | DIASTOLIC BLOOD PRESSURE: 73 MMHG

## 2018-10-15 DIAGNOSIS — J44.1 COPD EXACERBATION (HCC): Primary | ICD-10-CM

## 2018-10-15 PROCEDURE — 94640 AIRWAY INHALATION TREATMENT: CPT

## 2018-10-15 PROCEDURE — 99283 EMERGENCY DEPT VISIT LOW MDM: CPT

## 2018-10-15 PROCEDURE — 74011000250 HC RX REV CODE- 250: Performed by: EMERGENCY MEDICINE

## 2018-10-15 PROCEDURE — 77030029684 HC NEB SM VOL KT MONA -A

## 2018-10-15 PROCEDURE — 71046 X-RAY EXAM CHEST 2 VIEWS: CPT

## 2018-10-15 PROCEDURE — 74011636637 HC RX REV CODE- 636/637: Performed by: EMERGENCY MEDICINE

## 2018-10-15 PROCEDURE — 74011250637 HC RX REV CODE- 250/637: Performed by: EMERGENCY MEDICINE

## 2018-10-15 RX ORDER — IPRATROPIUM BROMIDE AND ALBUTEROL SULFATE 2.5; .5 MG/3ML; MG/3ML
3 SOLUTION RESPIRATORY (INHALATION)
Status: COMPLETED | OUTPATIENT
Start: 2018-10-15 | End: 2018-10-15

## 2018-10-15 RX ORDER — ALBUTEROL SULFATE 90 UG/1
1 AEROSOL, METERED RESPIRATORY (INHALATION)
Qty: 1 INHALER | Refills: 0 | Status: SHIPPED | OUTPATIENT
Start: 2018-10-15 | End: 2018-11-30 | Stop reason: SDUPTHER

## 2018-10-15 RX ORDER — PREDNISONE 50 MG/1
50 TABLET ORAL DAILY
Qty: 5 TAB | Refills: 0 | Status: SHIPPED | OUTPATIENT
Start: 2018-10-15 | End: 2018-10-20

## 2018-10-15 RX ORDER — ALBUTEROL SULFATE 0.83 MG/ML
5 SOLUTION RESPIRATORY (INHALATION)
Status: COMPLETED | OUTPATIENT
Start: 2018-10-15 | End: 2018-10-15

## 2018-10-15 RX ORDER — PREDNISONE 20 MG/1
60 TABLET ORAL
Status: COMPLETED | OUTPATIENT
Start: 2018-10-15 | End: 2018-10-15

## 2018-10-15 RX ORDER — GUAIFENESIN/DEXTROMETHORPHAN 100-10MG/5
10 SYRUP ORAL
Status: COMPLETED | OUTPATIENT
Start: 2018-10-15 | End: 2018-10-15

## 2018-10-15 RX ADMIN — ALBUTEROL SULFATE 5 MG: 2.5 SOLUTION RESPIRATORY (INHALATION) at 12:46

## 2018-10-15 RX ADMIN — GUAIFENESIN AND DEXTROMETHORPHAN 10 ML: 100; 10 SYRUP ORAL at 12:46

## 2018-10-15 RX ADMIN — PREDNISONE 60 MG: 20 TABLET ORAL at 12:47

## 2018-10-15 RX ADMIN — IPRATROPIUM BROMIDE AND ALBUTEROL SULFATE 3 ML: .5; 3 SOLUTION RESPIRATORY (INHALATION) at 12:46

## 2018-10-15 NOTE — ED PROVIDER NOTES
EMERGENCY DEPARTMENT HISTORY AND PHYSICAL EXAM 
 
12:36 PM 
 
 
Date: 10/15/2018 Patient Name: Kimber Selby History of Presenting Illness Chief Complaint Patient presents with  Cough History Provided By: Patient Chief Complaint: Cough Duration:2 Days Timing:  Acute Location: N/A Quality: N/A Severity: Mild Modifying Factors: No modifying or aggravating factors were reported. Associated Symptoms: denies any other associated signs or symptoms Additional History (Context): Kimber Selby is a 27 y.o. female with COPD and asthma who presents with a mild cough that began 2 days ago. Pt reports that she began having a cough 2 days ago. She states she has her Pneumonia shot and her flu shot. Pt reports her mother had lung cancer. Pt uses tobacco at home, 1 pack a day, and is not on Oxygen at home. Pt denies fever. No modifying or aggravating factors were reported. No other symptoms or concerns were expressed. PCP: Asif Aguayo MD 
 
Current Outpatient Prescriptions Medication Sig Dispense Refill  albuterol (PROVENTIL HFA, VENTOLIN HFA, PROAIR HFA) 90 mcg/actuation inhaler Take 1 Puff by inhalation every four (4) hours as needed for Wheezing. 1 Inhaler 0  predniSONE (DELTASONE) 50 mg tablet Take 1 Tab by mouth daily for 5 days. 5 Tab 0  
 dextromethorphan-guaiFENesin (ROBITUSSIN-DM)  mg/5 mL syrup Take 10 mL by mouth every six (6) hours as needed for Cough. 240 mL 0  
 mirtazapine (REMERON) 45 mg tablet Take 1 Tab by mouth nightly. 30 Tab 11  
 ondansetron hcl (ZOFRAN) 8 mg tablet Take 1 Tab by mouth every eight (8) hours as needed for Nausea. 30 Tab 1  
 atenolol (TENORMIN) 50 mg tablet Take 1 Tab by mouth daily. 30 Tab 11  
 gabapentin (NEURONTIN) 300 mg capsule Take 1 Cap by mouth three (3) times daily. 90 Cap 11  
 busPIRone (BUSPAR) 15 mg tablet Take 1 Tab by mouth three (3) times daily as needed.  90 Tab 11  
  QUEtiapine (SEROQUEL) 300 mg tablet Take 2 Tabs by mouth nightly. 60 Tab 11  
 hydrocortisone (PROCTOSOL HC) 2.5 % rectal cream Insert  into rectum three (3) times daily as needed for Hemorrhoids. 30 g 2  
 baclofen (LIORESAL) 20 mg tablet Take 1 Tab by mouth three (3) times daily. 90 Tab 11  
 acyclovir (ZOVIRAX) 400 mg tablet Take 1 Tab by mouth every four (4) hours (while awake). 40 Tab 1  
 sertraline (ZOLOFT) 100 mg tablet Take 2 Tabs by mouth daily. 60 Tab 11  
 budesonide-formoterol (SYMBICORT) 160-4.5 mcg/actuation HFAA Take 2 Puffs by inhalation two (2) times a day. 1 Inhaler 2  
 albuterol sulfate (PROAIR RESPICLICK) 90 mcg/actuation aepb Take 2 Puffs by inhalation every four (4) hours as needed. 1 Inhaler 0 Past History Past Medical History: 
Past Medical History:  
Diagnosis Date  Anal fissure  Hep C w/o coma, chronic (HCC)  Methamphetamine abuse in remission  Schizo-affective psychosis ([facility] [address]  Tachycardia Past Surgical History: 
Past Surgical History:  
Procedure Laterality Date  HX APPENDECTOMY  HX CHOLECYSTECTOMY  HX COLONOSCOPY  2014  HX HYSTERECTOMY  HX OTHER SURGICAL    
 right middle lobe wedge resection Family History: 
Family History Problem Relation Age of Onset  Cancer Mother 62  
  lung  Alcohol abuse Mother  Heart Disease Father  Alcohol abuse Father Social History: 
Social History Substance Use Topics  Smoking status: Current Every Day Smoker Packs/day: 1.50 Years: 20.00 Types: Cigarettes  Smokeless tobacco: Never Used  Alcohol use No  
 
 
Allergies: Allergies Allergen Reactions  Sulfa (Sulfonamide Antibiotics) Anaphylaxis  Amoxicillin Hives  Toradol [Ketorolac] Hives  Vistaril [Hydroxyzine Pamoate] Unknown (comments) Review of Systems Review of Systems Constitutional: Negative for chills and fever. Respiratory: Positive for cough. Negative for shortness of breath. Cardiovascular: Negative for chest pain. Gastrointestinal: Negative for abdominal pain, nausea and vomiting. Skin: Negative for rash. Neurological: Negative for weakness. All other systems reviewed and are negative. Physical Exam  
 
Visit Vitals  /73  Pulse 95  Temp 98.6 °F (37 °C)  Resp 20  SpO2 96% Physical Exam  
Constitutional: She is oriented to person, place, and time. She appears well-developed and well-nourished. No distress. HENT:  
Head: Normocephalic and atraumatic. Eyes: Pupils are equal, round, and reactive to light. Neck: No JVD present. No tracheal deviation present. No thyromegaly present. Cardiovascular: Normal rate, regular rhythm and normal heart sounds. Exam reveals no gallop and no friction rub. No murmur heard. Pulmonary/Chest: Effort normal and breath sounds normal. No stridor. No respiratory distress. She has no wheezes. She has no rales. She exhibits no tenderness. Abdominal: Soft. She exhibits no distension and no mass. There is no tenderness. There is no rebound and no guarding. Musculoskeletal: She exhibits no edema or tenderness. Lymphadenopathy:  
  She has no cervical adenopathy. Neurological: She is alert and oriented to person, place, and time. Skin: Skin is warm and dry. No rash noted. She is not diaphoretic. No erythema. No pallor. Psychiatric: She has a normal mood and affect. Her behavior is normal. Thought content normal.  
Nursing note and vitals reviewed. Diagnostic Study Results Labs - No results found for this or any previous visit (from the past 12 hour(s)). Radiologic Studies -  
XR CHEST PA LAT Final Result Medical Decision Making I am the first provider for this patient.  
 
I reviewed the vital signs, available nursing notes, past medical history, past surgical history, family history and social history. Provider Notes (Medical Decision Making): excellent VS; clear BS. Is 18 weeks pregnant. Humidified air, vicks vapor rub on chest and water recommended. Vital Signs-Reviewed the patient's vital signs. Pulse Oximetry Analysis -  100% on room air (Interpretation)Normal  
 
Cardiac Monitor: 
Rate: 74bpm 
Rhythm:  Normal Sinus Rhythm Nothing acute on CXR. Treat COPD exacerbation. Records Reviewed: Nursing Notes (Time of Review: 12:36 PM) Diagnosis Clinical Impression: 1. COPD exacerbation ([address] Disposition: home Follow-up Information Follow up With Details Comments Contact Info Mitzi Rosado MD Schedule an appointment as soon as possible for a visit in 1 day  [id number] [facility] [phone] [place] [id number] 
[phone] [facility] EMERGENCY DEPT  If symptoms worsen return immediately [id number] 
[phone] Patient's Medications Start Taking ALBUTEROL (PROVENTIL HFA, VENTOLIN HFA, PROAIR HFA) 90 MCG/ACTUATION INHALER    Take 1 Puff by inhalation every four (4) hours as needed for Wheezing. DEXTROMETHORPHAN-GUAIFENESIN (ROBITUSSIN-DM)  MG/5 ML SYRUP    Take 10 mL by mouth every six (6) hours as needed for Cough. PREDNISONE (DELTASONE) 50 MG TABLET    Take 1 Tab by mouth daily for 5 days. Continue Taking ACYCLOVIR (ZOVIRAX) 400 MG TABLET    Take 1 Tab by mouth every four (4) hours (while awake). ALBUTEROL SULFATE (PROAIR RESPICLICK) 90 MCG/ACTUATION AEPB    Take 2 Puffs by inhalation every four (4) hours as needed. ATENOLOL (TENORMIN) 50 MG TABLET    Take 1 Tab by mouth daily. BACLOFEN (LIORESAL) 20 MG TABLET    Take 1 Tab by mouth three (3) times daily. BUDESONIDE-FORMOTEROL (SYMBICORT) 160-4.5 MCG/ACTUATION HFAA    Take 2 Puffs by inhalation two (2) times a day. BUSPIRONE (BUSPAR) 15 MG TABLET    Take 1 Tab by mouth three (3) times daily as needed. GABAPENTIN (NEURONTIN) 300 MG CAPSULE    Take 1 Cap by mouth three (3) times daily. HYDROCORTISONE (PROCTOSOL HC) 2.5 % RECTAL CREAM    Insert  into rectum three (3) times daily as needed for Hemorrhoids. MIRTAZAPINE (REMERON) 45 MG TABLET    Take 1 Tab by mouth nightly. ONDANSETRON HCL (ZOFRAN) 8 MG TABLET    Take 1 Tab by mouth every eight (8) hours as needed for Nausea. QUETIAPINE (SEROQUEL) 300 MG TABLET    Take 2 Tabs by mouth nightly. SERTRALINE (ZOLOFT) 100 MG TABLET    Take 2 Tabs by mouth daily. These Medications have changed No medications on file Stop Taking No medications on file  
 
_______________________________ Attestations: 
Scribe Attestation Rekha Izaguirre acting as a scribe for and in the presence of Rodgers Bernheim, [employer] October 15, 2018 at 12:36 PM 
    
Provider Attestation:     
I personally performed the services described in the documentation, reviewed the documentation, as recorded by the scribe in my presence, and it accurately and completely records my words and actions. October 15, 2018 at 12:36 PM - Rodgers Bernheim, [place] 
_______________________________

## 2018-10-15 NOTE — DISCHARGE INSTRUCTIONS
COPD Exacerbation Plan: Care Instructions  Your Care Instructions    If you have chronic obstructive pulmonary disease (COPD), your usual shortness of breath could suddenly get worse. You may start coughing more and have more mucus. This flare-up is called a COPD exacerbation (say \"hy-CBN-[id number]-BAY-sunshine\"). A lung infection or air pollution could set off an exacerbation. Sometimes it can happen after a quick change in temperature or being around chemicals. Work with your doctor to make a plan for dealing with an exacerbation. You can better manage it if you plan ahead. Follow-up care is a key part of your treatment and safety. Be sure to make and go to all appointments, and call your doctor if you are having problems. It's also a good idea to know your test results and keep a list of the medicines you take. How can you care for yourself at home? During an exacerbation  · Do not panic if you start to have one. Quick treatment at home may help you prevent serious breathing problems. If you have a COPD exacerbation plan that you developed with your doctor, follow it. · Take your medicines exactly as your doctor tells you. ¨ Use your inhaler as directed by your doctor. If your symptoms do not get better after you use your medicine, have someone take you to the emergency room. Call an ambulance if necessary. ¨ With inhaled medicines, a spacer or a nebulizer may help you get more medicine to your lungs. Ask your doctor or pharmacist how to use them properly. Practice using the spacer in front of a mirror before you have an exacerbation. This may help you get the medicine into your lungs quickly. ¨ If your doctor has given you steroid pills, take them as directed. ¨ Your doctor may have given you a prescription for antibiotics, which you can fill if you need it. ¨ Talk to your doctor if you have any problems with your medicine.  And call your doctor if you have to use your antibiotic or steroid pills.  Preventing an exacerbation  · Do not smoke. This is the most important step you can take to prevent more damage to your lungs and prevent problems. If you already smoke, it is never too late to stop. If you need help quitting, talk to your doctor about stop-smoking programs and medicines. These can increase your chances of quitting for good. · Take your daily medicines as prescribed. · Avoid colds and flu. ¨ Get a pneumococcal vaccine. ¨ Get a flu vaccine each year, as soon as it is available. Ask those you live or work with to do the same, so they will not get the flu and infect you. ¨ Try to stay away from people with colds or the flu. ¨ Wash your hands often. · Avoid secondhand smoke; air pollution; cold, dry air; hot, humid air; and high altitudes. Stay at home with your windows closed when air pollution is bad. · Learn breathing techniques for COPD, such as breathing through pursed lips. These techniques can help you breathe easier during an exacerbation. When should you call for help? Call 911 anytime you think you may need emergency care. For example, call if:    · You have severe trouble breathing.     · You have severe chest pain.    Call your doctor now or seek immediate medical care if:    · You have new or worse shortness of breath.     · You develop new chest pain.     · You are coughing more deeply or more often, especially if you notice more mucus or a change in the color of your mucus.     · You cough up blood.     · You have new or increased swelling in your legs or belly.     · You have a fever.    Watch closely for changes in your health, and be sure to contact your doctor if:    · You need to use your antibiotic or steroid pills.     · Your symptoms are getting worse. Where can you learn more? Go to http://ascencion-kenzie.info/. Enter E960 in the search box to learn more about \"COPD Exacerbation Plan: Care Instructions. \"  Current as of: December 6, 2017  Content Version: 11.8  © 7376-1537 [employer]. Care instructions adapted under license by [employer] (which disclaims liability or warranty for this information). If you have questions about a medical condition or this instruction, always ask your healthcare professional. [address] any warranty or liability for your use of this information.

## 2018-10-26 ENCOUNTER — TELEPHONE (OUTPATIENT)
Dept: FAMILY MEDICINE CLINIC | Age: 33
End: 2018-10-26

## 2018-10-26 NOTE — TELEPHONE ENCOUNTER
Pt. Is requesting medication Bentyl for her stomach pain. She has an upcoming appt. On 11/02/18. Please advise.

## 2018-10-27 ENCOUNTER — HOSPITAL ENCOUNTER (EMERGENCY)
Age: 33
Discharge: HOME | End: 2018-10-28
Attending: EMERGENCY MEDICINE
Payer: MEDICAID

## 2018-10-27 ENCOUNTER — APPOINTMENT (OUTPATIENT)
Dept: CT IMAGING | Age: 33
End: 2018-10-27
Attending: PHYSICIAN ASSISTANT
Payer: MEDICAID

## 2018-10-27 VITALS
RESPIRATION RATE: 16 BRPM | HEART RATE: 100 BPM | DIASTOLIC BLOOD PRESSURE: 68 MMHG | TEMPERATURE: 99.3 F | BODY MASS INDEX: 27.44 KG/M2 | SYSTOLIC BLOOD PRESSURE: 109 MMHG | OXYGEN SATURATION: 97 % | WEIGHT: 150 LBS

## 2018-10-27 DIAGNOSIS — D72.829 LEUKOCYTOSIS, UNSPECIFIED TYPE: ICD-10-CM

## 2018-10-27 DIAGNOSIS — R10.84 ABDOMINAL PAIN, GENERALIZED: Primary | ICD-10-CM

## 2018-10-27 DIAGNOSIS — R14.0 ABDOMINAL DISTENSION: ICD-10-CM

## 2018-10-27 DIAGNOSIS — N39.0 URINARY TRACT INFECTION WITHOUT HEMATURIA, SITE UNSPECIFIED: ICD-10-CM

## 2018-10-27 LAB
ALBUMIN SERPL-MCNC: 3.8 G/DL (ref 3.4–5)
ALBUMIN/GLOB SERPL: 1.2 {RATIO} (ref 0.8–1.7)
ALP SERPL-CCNC: 86 U/L (ref 45–117)
ALT SERPL-CCNC: 17 U/L (ref 13–56)
ANION GAP SERPL CALC-SCNC: 6 MMOL/L (ref 3–18)
APPEARANCE UR: CLEAR
AST SERPL-CCNC: 15 U/L (ref 15–37)
BACTERIA URNS QL MICRO: ABNORMAL /HPF
BASOPHILS # BLD: 0.1 K/UL (ref 0–0.1)
BASOPHILS NFR BLD: 0 % (ref 0–2)
BILIRUB SERPL-MCNC: 0.4 MG/DL (ref 0.2–1)
BILIRUB UR QL: NEGATIVE
BUN SERPL-MCNC: 5 MG/DL (ref 7–18)
BUN/CREAT SERPL: 7 (ref 12–20)
CALCIUM SERPL-MCNC: 8 MG/DL (ref 8.5–10.1)
CHLORIDE SERPL-SCNC: 107 MMOL/L (ref 100–108)
CO2 SERPL-SCNC: 26 MMOL/L (ref 21–32)
COLOR UR: YELLOW
CREAT SERPL-MCNC: 0.69 MG/DL (ref 0.6–1.3)
DIFFERENTIAL METHOD BLD: ABNORMAL
EOSINOPHIL # BLD: 0.3 K/UL (ref 0–0.4)
EOSINOPHIL NFR BLD: 2 % (ref 0–5)
EPITH CASTS URNS QL MICRO: ABNORMAL /LPF (ref 0–5)
ERYTHROCYTE [DISTWIDTH] IN BLOOD BY AUTOMATED COUNT: 13 % (ref 11.6–14.5)
GLOBULIN SER CALC-MCNC: 3.3 G/DL (ref 2–4)
GLUCOSE SERPL-MCNC: 105 MG/DL (ref 74–99)
GLUCOSE UR STRIP.AUTO-MCNC: NEGATIVE MG/DL
HCG UR QL: NEGATIVE
HCT VFR BLD AUTO: 39.2 % (ref 35–45)
HGB BLD-MCNC: 13.5 G/DL (ref 12–16)
HGB UR QL STRIP: NEGATIVE
KETONES UR QL STRIP.AUTO: ABNORMAL MG/DL
LEUKOCYTE ESTERASE UR QL STRIP.AUTO: ABNORMAL
LIPASE SERPL-CCNC: 121 U/L (ref 73–393)
LYMPHOCYTES # BLD: 3.6 K/UL (ref 0.9–3.6)
LYMPHOCYTES NFR BLD: 19 % (ref 21–52)
MCH RBC QN AUTO: 33.1 PG (ref 24–34)
MCHC RBC AUTO-ENTMCNC: 34.4 G/DL (ref 31–37)
MCV RBC AUTO: 96.1 FL (ref 74–97)
MONOCYTES # BLD: 1 K/UL (ref 0.05–1.2)
MONOCYTES NFR BLD: 5 % (ref 3–10)
NEUTS SEG # BLD: 14 K/UL (ref 1.8–8)
NEUTS SEG NFR BLD: 74 % (ref 40–73)
NITRITE UR QL STRIP.AUTO: NEGATIVE
PH UR STRIP: 5.5 [PH] (ref 5–8)
PLATELET # BLD AUTO: 261 K/UL (ref 135–420)
PMV BLD AUTO: 9 FL (ref 9.2–11.8)
POTASSIUM SERPL-SCNC: 3.4 MMOL/L (ref 3.5–5.5)
PROT SERPL-MCNC: 7.1 G/DL (ref 6.4–8.2)
PROT UR STRIP-MCNC: NEGATIVE MG/DL
RBC # BLD AUTO: 4.08 M/UL (ref 4.2–5.3)
RBC #/AREA URNS HPF: ABNORMAL /HPF (ref 0–5)
SODIUM SERPL-SCNC: 139 MMOL/L (ref 136–145)
SP GR UR REFRACTOMETRY: 1.02 (ref 1–1.03)
UROBILINOGEN UR QL STRIP.AUTO: 1 EU/DL (ref 0.2–1)
WBC # BLD AUTO: 19 K/UL (ref 4.6–13.2)
WBC URNS QL MICRO: ABNORMAL /HPF (ref 0–4)

## 2018-10-27 PROCEDURE — 80053 COMPREHEN METABOLIC PANEL: CPT | Performed by: PHYSICIAN ASSISTANT

## 2018-10-27 PROCEDURE — 83690 ASSAY OF LIPASE: CPT | Performed by: PHYSICIAN ASSISTANT

## 2018-10-27 PROCEDURE — 85025 COMPLETE CBC W/AUTO DIFF WBC: CPT | Performed by: PHYSICIAN ASSISTANT

## 2018-10-27 PROCEDURE — 81025 URINE PREGNANCY TEST: CPT | Performed by: PHYSICIAN ASSISTANT

## 2018-10-27 PROCEDURE — 99284 EMERGENCY DEPT VISIT MOD MDM: CPT

## 2018-10-27 PROCEDURE — 74177 CT ABD & PELVIS W/CONTRAST: CPT

## 2018-10-27 PROCEDURE — 81001 URINALYSIS AUTO W/SCOPE: CPT | Performed by: PHYSICIAN ASSISTANT

## 2018-10-27 PROCEDURE — 96374 THER/PROPH/DIAG INJ IV PUSH: CPT

## 2018-10-27 RX ORDER — ONDANSETRON 2 MG/ML
4 INJECTION INTRAMUSCULAR; INTRAVENOUS
Status: COMPLETED | OUTPATIENT
Start: 2018-10-27 | End: 2018-10-28

## 2018-10-28 PROCEDURE — 74011636320 HC RX REV CODE- 636/320: Performed by: EMERGENCY MEDICINE

## 2018-10-28 PROCEDURE — 74011250636 HC RX REV CODE- 250/636: Performed by: PHYSICIAN ASSISTANT

## 2018-10-28 RX ORDER — NITROFURANTOIN 25; 75 MG/1; MG/1
100 CAPSULE ORAL 2 TIMES DAILY
Qty: 10 CAP | Refills: 0 | Status: SHIPPED | OUTPATIENT
Start: 2018-10-28 | End: 2018-11-02

## 2018-10-28 RX ORDER — ONDANSETRON 4 MG/1
TABLET, ORALLY DISINTEGRATING ORAL
Qty: 10 TAB | Refills: 0 | Status: SHIPPED | OUTPATIENT
Start: 2018-10-28 | End: 2018-11-30 | Stop reason: SDUPTHER

## 2018-10-28 RX ADMIN — ONDANSETRON 4 MG: 2 INJECTION, SOLUTION INTRAMUSCULAR; INTRAVENOUS at 00:17

## 2018-10-28 RX ADMIN — IOPAMIDOL 100 ML: 612 INJECTION, SOLUTION INTRAVENOUS at 00:01

## 2018-10-28 NOTE — ED NOTES
I performed a brief evaluation, including history and physical, of the patient here in triage and I have determined that pt will need further treatment and evaluation from the main side ER physician. I have placed initial orders to help in expediting patients care. October 27, 2018 at 8:38 PM - Angella Porras PA-C Visit Vitals /90 (BP 1 Location: Right arm, BP Patient Position: At rest) Pulse 100 Temp 99.3 °F (37.4 °C) Resp 16 Wt 68 kg (150 lb) SpO2 97% BMI 27.44 kg/m²

## 2018-10-28 NOTE — DISCHARGE INSTRUCTIONS
Please return to emergency room in 8-24 hours for recheck. Return sooner if any worse! Acute abdomen cannot be ruled out despite reassuring presentation.

## 2018-10-28 NOTE — ED NOTES
Provider have reviewed discharge instructions with the patient. The patient verbalized understanding. Pt ambulated out of the ED.

## 2018-10-29 DIAGNOSIS — F41.9 ANXIETY: Primary | ICD-10-CM

## 2018-10-29 RX ORDER — DICYCLOMINE HYDROCHLORIDE 10 MG/1
10 CAPSULE ORAL 4 TIMES DAILY
Qty: 20 CAP | Refills: 0 | Status: SHIPPED | OUTPATIENT
Start: 2018-10-29 | End: 2018-11-20 | Stop reason: SDUPTHER

## 2018-11-05 ENCOUNTER — HOSPITAL ENCOUNTER (EMERGENCY)
Age: 33
Discharge: HOME | End: 2018-11-05
Attending: EMERGENCY MEDICINE
Payer: MEDICAID

## 2018-11-05 ENCOUNTER — DOCUMENTATION ONLY (OUTPATIENT)
Dept: FAMILY MEDICINE CLINIC | Age: 33
End: 2018-11-05

## 2018-11-05 ENCOUNTER — APPOINTMENT (OUTPATIENT)
Dept: ULTRASOUND IMAGING | Age: 33
End: 2018-11-05
Attending: EMERGENCY MEDICINE
Payer: MEDICAID

## 2018-11-05 VITALS
WEIGHT: 140 LBS | HEIGHT: 62 IN | RESPIRATION RATE: 16 BRPM | BODY MASS INDEX: 25.76 KG/M2 | OXYGEN SATURATION: 98 % | TEMPERATURE: 98.4 F | HEART RATE: 80 BPM | DIASTOLIC BLOOD PRESSURE: 72 MMHG | SYSTOLIC BLOOD PRESSURE: 104 MMHG

## 2018-11-05 DIAGNOSIS — R14.0 ABDOMINAL DISTENTION: ICD-10-CM

## 2018-11-05 DIAGNOSIS — R10.84 ABDOMINAL PAIN, GENERALIZED: Primary | ICD-10-CM

## 2018-11-05 LAB
APPEARANCE UR: CLEAR
BACTERIA URNS QL MICRO: ABNORMAL /HPF
BILIRUB UR QL: NEGATIVE
COLOR UR: YELLOW
EPITH CASTS URNS QL MICRO: ABNORMAL /LPF (ref 0–5)
GLUCOSE UR STRIP.AUTO-MCNC: NEGATIVE MG/DL
HCG UR QL: NEGATIVE
HGB UR QL STRIP: NEGATIVE
KETONES UR QL STRIP.AUTO: NEGATIVE MG/DL
LEUKOCYTE ESTERASE UR QL STRIP.AUTO: ABNORMAL
NITRITE UR QL STRIP.AUTO: NEGATIVE
PH UR STRIP: 6.5 [PH] (ref 5–8)
PROT UR STRIP-MCNC: NEGATIVE MG/DL
RBC #/AREA URNS HPF: 0 /HPF (ref 0–5)
SP GR UR REFRACTOMETRY: 1.01 (ref 1–1.03)
UROBILINOGEN UR QL STRIP.AUTO: 0.2 EU/DL (ref 0.2–1)
WBC URNS QL MICRO: ABNORMAL /HPF (ref 0–4)

## 2018-11-05 PROCEDURE — 74011250637 HC RX REV CODE- 250/637: Performed by: EMERGENCY MEDICINE

## 2018-11-05 PROCEDURE — 81025 URINE PREGNANCY TEST: CPT | Performed by: EMERGENCY MEDICINE

## 2018-11-05 PROCEDURE — 99284 EMERGENCY DEPT VISIT MOD MDM: CPT

## 2018-11-05 PROCEDURE — 81001 URINALYSIS AUTO W/SCOPE: CPT | Performed by: EMERGENCY MEDICINE

## 2018-11-05 PROCEDURE — 76705 ECHO EXAM OF ABDOMEN: CPT

## 2018-11-05 RX ORDER — ONDANSETRON 4 MG/1
4 TABLET, ORALLY DISINTEGRATING ORAL
Status: COMPLETED | OUTPATIENT
Start: 2018-11-05 | End: 2018-11-05

## 2018-11-05 RX ADMIN — ONDANSETRON 4 MG: 4 TABLET, ORALLY DISINTEGRATING ORAL at 12:36

## 2018-11-05 NOTE — ED PROVIDER NOTES
EMERGENCY DEPARTMENT HISTORY AND PHYSICAL EXAM 
 
11:36 AM 
 
 
Date: 11/5/2018 Patient Name: Varsha Clements History of Presenting Illness Chief Complaint Patient presents with  Abdominal Pain History Provided By: Patient Chief Complaint: Abd pain Duration:  6 wks Timing:  Constant Location: Periumbilical, diffuse Quality: \"like I'm in labor\" Severity: 10 out of 10 Modifying Factors: not improved by Miralax Associated Symptoms: abd distension, appetite change, vomiting, back pain Additional History (Context): Varsha Clemnets is a 27 y.o. female with hep C, methamphetamine abuse, psychiatric disorder, COPD who presents with severe, stabbing periumbilical abd pain C1CIO. Pt states it feels like she is in labor, she feels she has to bear down and believes she is constipated but does not have hard stools and denies being on narcotics. She notes passing gas is difficult but improved while laying on her L side. Associated sx include vomiting, loss of appetite, back pain. Pt had a CT last week which showed significant fecal matter; Miralax and medications have not improved her sx since then. PSHx hysterectomy, denies possiblity of pregnancy. PCP: Matias Tiwari MD 
 
Current Outpatient Medications Medication Sig Dispense Refill  ondansetron (ZOFRAN ODT) 4 mg disintegrating tablet Take 1-2 tablets every 6-8 hours as needed for nausea and vomiting. 10 Tab 0  
 albuterol (PROVENTIL HFA, VENTOLIN HFA, PROAIR HFA) 90 mcg/actuation inhaler Take 1 Puff by inhalation every four (4) hours as needed for Wheezing. 1 Inhaler 0  
 dextromethorphan-guaiFENesin (ROBITUSSIN-DM)  mg/5 mL syrup Take 10 mL by mouth every six (6) hours as needed for Cough. 240 mL 0  
 mirtazapine (REMERON) 45 mg tablet Take 1 Tab by mouth nightly. 30 Tab 11  
 ondansetron hcl (ZOFRAN) 8 mg tablet Take 1 Tab by mouth every eight (8) hours as needed for Nausea.  30 Tab 1  
  atenolol (TENORMIN) 50 mg tablet Take 1 Tab by mouth daily. 30 Tab 11  
 gabapentin (NEURONTIN) 300 mg capsule Take 1 Cap by mouth three (3) times daily. 90 Cap 11  
 busPIRone (BUSPAR) 15 mg tablet Take 1 Tab by mouth three (3) times daily as needed. 90 Tab 11  
 QUEtiapine (SEROQUEL) 300 mg tablet Take 2 Tabs by mouth nightly. 60 Tab 11  
 hydrocortisone (PROCTOSOL HC) 2.5 % rectal cream Insert  into rectum three (3) times daily as needed for Hemorrhoids. 30 g 2  
 baclofen (LIORESAL) 20 mg tablet Take 1 Tab by mouth three (3) times daily. 90 Tab 11  
 acyclovir (ZOVIRAX) 400 mg tablet Take 1 Tab by mouth every four (4) hours (while awake). 40 Tab 1  
 sertraline (ZOLOFT) 100 mg tablet Take 2 Tabs by mouth daily. 60 Tab 11  
 budesonide-formoterol (SYMBICORT) 160-4.5 mcg/actuation HFAA Take 2 Puffs by inhalation two (2) times a day. 1 Inhaler 2  
 albuterol sulfate (PROAIR RESPICLICK) 90 mcg/actuation aepb Take 2 Puffs by inhalation every four (4) hours as needed. 1 Inhaler 0 Past History Past Medical History: 
Past Medical History:  
Diagnosis Date  Anal fissure  Hep C w/o coma, chronic (HCC)  Methamphetamine abuse in remission ([facility] [address]  Schizo-affective psychosis ([facility] [address]  Tachycardia Past Surgical History: 
Past Surgical History:  
Procedure Laterality Date  HX APPENDECTOMY  HX CHOLECYSTECTOMY  HX COLONOSCOPY  2014  HX HYSTERECTOMY  HX OTHER SURGICAL    
 right middle lobe wedge resection Family History: 
Family History Problem Relation Age of Onset  Cancer Mother 62  
     lung  Alcohol abuse Mother  Heart Disease Father  Alcohol abuse Father Social History: 
Social History Tobacco Use  Smoking status: Current Every Day Smoker Packs/day: 1.50 Years: 20.00 Pack years: 30.00 Types: Cigarettes  Smokeless tobacco: Never Used Substance Use Topics  Alcohol use: No  
 Drug use:  No  
 
 
 Allergies: Allergies Allergen Reactions  Sulfa (Sulfonamide Antibiotics) Anaphylaxis  Amoxicillin Hives  Toradol [Ketorolac] Hives  Vistaril [Hydroxyzine Pamoate] Unknown (comments) Review of Systems Review of Systems Constitutional: Positive for appetite change. Respiratory: Positive for cough. Negative for shortness of breath. Cardiovascular: Negative for chest pain. Gastrointestinal: Positive for abdominal distention, abdominal pain, constipation, diarrhea and vomiting. Musculoskeletal: Positive for back pain. All other systems reviewed and are negative. Physical Exam  
 
Visit Vitals /72 Pulse 80 Temp 98.4 °F (36.9 °C) Resp 16 Ht 5' 2\" (1.575 m) Wt 63.5 kg (140 lb) SpO2 98% BMI 25.61 kg/m² Physical Exam  
Constitutional: She is oriented to person, place, and time. She appears well-developed and well-nourished. HENT:  
Head: Normocephalic and atraumatic. Neck: Neck supple. No JVD present. Cardiovascular: Normal rate and regular rhythm. Pulmonary/Chest: Effort normal and breath sounds normal. No respiratory distress. Abdominal: Soft. She exhibits distension. There is no tenderness. There is no rebound and no guarding. Moderate distention Musculoskeletal: She exhibits no edema. No joint tenderness Neurological: She is alert and oriented to person, place, and time. Skin: Skin is warm and dry. No erythema. Psychiatric: Judgment normal.  
 
 
 
Diagnostic Study Results Labs - Recent Results (from the past 12 hour(s)) URINALYSIS W/ RFLX MICROSCOPIC Collection Time: 11/05/18 11:25 AM  
Result Value Ref Range Color YELLOW Appearance CLEAR Specific gravity 1.007 1.005 - 1.030    
 pH (UA) 6.5 5.0 - 8.0 Protein NEGATIVE  NEG mg/dL Glucose NEGATIVE  NEG mg/dL Ketone NEGATIVE  NEG mg/dL Bilirubin NEGATIVE  NEG Blood NEGATIVE  NEG Urobilinogen 0.2 0.2 - 1.0 EU/dL Nitrites NEGATIVE  NEG Leukocyte Esterase TRACE (A) NEG    
HCG URINE, QL Collection Time: 11/05/18 11:25 AM  
Result Value Ref Range HCG urine, QL NEGATIVE  NEG    
URINE MICROSCOPIC ONLY Collection Time: 11/05/18 11:25 AM  
Result Value Ref Range WBC 0 to 3 0 - 4 /hpf  
 RBC 0 0 - 5 /hpf Epithelial cells 2+ 0 - 5 /lpf Bacteria 1+ (A) NEG /hpf Radiologic Studies -  
US ABD LTD Final Result [id number] Result Date: 11/5/2018 EXAM: Limited right upper quadrant abdominal ultrasound INDICATION: Abdominal distention and hepatitis. COMPARISON: CT performed 10/28/2018. TECHNIQUE: Real-time abdomen/right upper quadrant sonography in multiple planes was performed with image documentation. Grayscale, color flow Doppler imaging, and velocity spectral waveform analysis of the portal vein was performed (duplex imaging). _______________ FINDINGS: LIVER: Liver is mildly enlarged measuring approximately 22 cm in length with increased parenchymal echogenicity noted diffusely. No focal mass Color flow Doppler and velocity spectral waveform analysis of the portal vein shows normal (hepatopedal) direction of flow. Desmond Quacher BILIARY SYSTEM: No intrahepatic biliary dilatation. Common bile duct is normal in caliber measuring 0.6 cm. GALLBLADDER: The gallbladder is surgically absent. RIGHT KIDNEY: 11.6 cm in length. No hydronephrosis or renal mass. No visible calculi. PANCREAS: Head and body are unremarkable in appearance though the tail is obscured by overlying bowel gas. IVC: Visualized portions are unremarkable in appearance. OTHER: No ascites. _______________ IMPRESSION: 1. Hepatomegaly and diffusely increased hepatic parenchymal echogenicity compatible with steatosis. 2. Prior cholecystectomy. Medical Decision Making I am the first provider for this patient.  
 
I reviewed the vital signs, available nursing notes, past medical history, past surgical history, family history and social history. Vital Signs-Reviewed the patient's vital signs. Pulse Oximetry Analysis -  96% on room air (Interpretation) nonhypoxic Cardiac Monitor: 
Rate: 80 Rhythm:  Normal Sinus Rhythm Records Reviewed: Nursing Notes (Time of Review: 11:36 AM) 
 
ED Course: Progress Notes, Reevaluation, and Consults: 
 
Provider Notes (Medical Decision Making): 26 y/o female presents with abd distention. Seen last week, had normal CT, pt initially was told constipation, however not in report. Will obtain US. No indication for repeat labs, unremarkable last week. Discussed results with pt, stable for dc home. Advised GI follow up. Diagnosis Clinical Impression: 1. Abdominal pain, generalized 2. Abdominal distention Disposition: Discharge Follow-up Information Follow up With Specialties Details Why Contact Info Lizeth Corea MD Family Practice, Internal Medicine Schedule an appointment as soon as possible for a visit in 2 days As needed, Follow up from emergency department [id number] [id number] [phone] Medication List  
  
ASK your doctor about these medications   
acyclovir 400 mg tablet Commonly known as:  ZOVIRAX Take 1 Tab by mouth every four (4) hours (while awake). * albuterol sulfate 90 mcg/actuation Aepb Commonly known as:  Bedelia Tanana Take 2 Puffs by inhalation every four (4) hours as needed. * albuterol 90 mcg/actuation inhaler Commonly known as:  PROVENTIL HFA, VENTOLIN HFA, PROAIR HFA Take 1 Puff by inhalation every four (4) hours as needed for Wheezing. atenolol 50 mg tablet Commonly known as:  TENORMIN Take 1 Tab by mouth daily. baclofen 20 mg tablet Commonly known as:  LIORESAL Take 1 Tab by mouth three (3) times daily. budesonide-formoterol 160-4.5 mcg/actuation Hfaa Commonly known as:  SYMBICORT 
 Take 2 Puffs by inhalation two (2) times a day. busPIRone 15 mg tablet Commonly known as:  BUSPAR Take 1 Tab by mouth three (3) times daily as needed. dextromethorphan-guaiFENesin  mg/5 mL syrup Commonly known as:  ROBITUSSIN-DM Take 10 mL by mouth every six (6) hours as needed for Cough. gabapentin 300 mg capsule Commonly known as:  NEURONTIN Take 1 Cap by mouth three (3) times daily. hydrocortisone 2.5 % rectal cream 
Commonly known as:  PROCTOSOL HC Insert  into rectum three (3) times daily as needed for Hemorrhoids. mirtazapine 45 mg tablet Commonly known as:  Isis Lav Take 1 Tab by mouth nightly. ondansetron 4 mg disintegrating tablet Commonly known as:  ZOFRAN ODT Take 1-2 tablets every 6-8 hours as needed for nausea and vomiting. ondansetron hcl 8 mg tablet Commonly known as:  Baljit Aus Take 1 Tab by mouth every eight (8) hours as needed for Nausea. QUEtiapine 300 mg tablet Commonly known as:  SEROquel Take 2 Tabs by mouth nightly. sertraline 100 mg tablet Commonly known as:  ZOLOFT Take 2 Tabs by mouth daily. * This list has 2 medication(s) that are the same as other medications prescribed for you. Read the directions carefully, and ask your doctor or other care provider to review them with you.  
  
  
  
 
_______________________________ Attestations: 
Scribe Attestation Elia Villela acting as a scribe for and in the presence of Alyssa Heredia, DO November 05, 2018 at 1:51 PM 
    
Provider Attestation:     
I personally performed the services described in the documentation, reviewed the documentation, as recorded by the scribe in my presence, and it accurately and completely records my words and actions. November 05, 2018 at 1:51 PM - Alyssa Heredia, DO   
_______________________________

## 2018-11-05 NOTE — LETTER
11/5/2018 [address] [place] [phone] Dear Ms. Zaynabcynthia Roberto, We had an appointment reserved for you on 11/2/18 and were concerned when you did not show or call within 24 hours to cancel the appointment. Our policy is to call patients two days prior to their appointment to remind them of the date and time. We perform these calls as a courtesy to our patients and to allow us the opportunity to rebook the time slot should the appointment not be necessary. Recognizing that everyones time is valuable and that appointment time is limited, we ask that you provide 24 hours notice if you are unable to keep your appointment. Please call us at your earliest convenience to reschedule your appointment as your provider felt it was important to see you. Thank you for your anticipated cooperation. [address] [place] [id number] 
[phone]

## 2018-11-05 NOTE — DISCHARGE INSTRUCTIONS

## 2018-11-05 NOTE — ED NOTES
I have reviewed discharge instructions with the patient. The patient verbalized understanding. Pt walked to ed lobby in no distress and agreeable to terms of discharge.

## 2018-11-14 ENCOUNTER — HOSPITAL ENCOUNTER (EMERGENCY)
Age: 33
Discharge: HOME | End: 2018-11-14
Attending: EMERGENCY MEDICINE
Payer: MEDICAID

## 2018-11-14 VITALS
RESPIRATION RATE: 16 BRPM | SYSTOLIC BLOOD PRESSURE: 108 MMHG | TEMPERATURE: 98.7 F | HEIGHT: 62 IN | OXYGEN SATURATION: 99 % | WEIGHT: 155 LBS | HEART RATE: 90 BPM | BODY MASS INDEX: 28.52 KG/M2 | DIASTOLIC BLOOD PRESSURE: 89 MMHG

## 2018-11-14 DIAGNOSIS — R14.0 ABDOMINAL DISTENSION: Primary | ICD-10-CM

## 2018-11-14 DIAGNOSIS — R18.8 OTHER ASCITES: ICD-10-CM

## 2018-11-14 DIAGNOSIS — Z98.890 HISTORY OF ABDOMINAL PARACENTESIS: Primary | ICD-10-CM

## 2018-11-14 LAB
ALBUMIN SERPL-MCNC: 3.7 G/DL (ref 3.4–5)
ALBUMIN/GLOB SERPL: 1.1 {RATIO} (ref 0.8–1.7)
ALP SERPL-CCNC: 94 U/L (ref 45–117)
ALT SERPL-CCNC: 16 U/L (ref 13–56)
ANION GAP SERPL CALC-SCNC: 6 MMOL/L (ref 3–18)
AST SERPL-CCNC: 16 U/L (ref 15–37)
BASOPHILS # BLD: 0.1 K/UL (ref 0–0.1)
BASOPHILS NFR BLD: 1 % (ref 0–2)
BILIRUB SERPL-MCNC: 0.2 MG/DL (ref 0.2–1)
BUN SERPL-MCNC: 7 MG/DL (ref 7–18)
BUN/CREAT SERPL: 9 (ref 12–20)
CALCIUM SERPL-MCNC: 8.4 MG/DL (ref 8.5–10.1)
CHLORIDE SERPL-SCNC: 109 MMOL/L (ref 100–108)
CO2 SERPL-SCNC: 24 MMOL/L (ref 21–32)
CREAT SERPL-MCNC: 0.78 MG/DL (ref 0.6–1.3)
DIFFERENTIAL METHOD BLD: ABNORMAL
EOSINOPHIL # BLD: 0.2 K/UL (ref 0–0.4)
EOSINOPHIL NFR BLD: 2 % (ref 0–5)
ERYTHROCYTE [DISTWIDTH] IN BLOOD BY AUTOMATED COUNT: 13.1 % (ref 11.6–14.5)
GLOBULIN SER CALC-MCNC: 3.4 G/DL (ref 2–4)
GLUCOSE SERPL-MCNC: 106 MG/DL (ref 74–99)
HCT VFR BLD AUTO: 41.3 % (ref 35–45)
HGB BLD-MCNC: 14.2 G/DL (ref 12–16)
LIPASE SERPL-CCNC: 85 U/L (ref 73–393)
LYMPHOCYTES # BLD: 2.7 K/UL (ref 0.9–3.6)
LYMPHOCYTES NFR BLD: 31 % (ref 21–52)
MCH RBC QN AUTO: 33.4 PG (ref 24–34)
MCHC RBC AUTO-ENTMCNC: 34.4 G/DL (ref 31–37)
MCV RBC AUTO: 97.2 FL (ref 74–97)
MONOCYTES # BLD: 0.8 K/UL (ref 0.05–1.2)
MONOCYTES NFR BLD: 9 % (ref 3–10)
NEUTS SEG # BLD: 4.9 K/UL (ref 1.8–8)
NEUTS SEG NFR BLD: 57 % (ref 40–73)
PLATELET # BLD AUTO: 299 K/UL (ref 135–420)
PMV BLD AUTO: 9.1 FL (ref 9.2–11.8)
POTASSIUM SERPL-SCNC: 4.1 MMOL/L (ref 3.5–5.5)
PROT SERPL-MCNC: 7.1 G/DL (ref 6.4–8.2)
RBC # BLD AUTO: 4.25 M/UL (ref 4.2–5.3)
SODIUM SERPL-SCNC: 139 MMOL/L (ref 136–145)
WBC # BLD AUTO: 8.6 K/UL (ref 4.6–13.2)

## 2018-11-14 PROCEDURE — 80053 COMPREHEN METABOLIC PANEL: CPT

## 2018-11-14 PROCEDURE — 83690 ASSAY OF LIPASE: CPT

## 2018-11-14 PROCEDURE — 99284 EMERGENCY DEPT VISIT MOD MDM: CPT

## 2018-11-14 PROCEDURE — 74011250637 HC RX REV CODE- 250/637: Performed by: EMERGENCY MEDICINE

## 2018-11-14 PROCEDURE — 85025 COMPLETE CBC W/AUTO DIFF WBC: CPT

## 2018-11-14 RX ORDER — NAPROXEN 250 MG/1
500 TABLET ORAL ONCE
Status: COMPLETED | OUTPATIENT
Start: 2018-11-14 | End: 2018-11-14

## 2018-11-14 RX ORDER — NAPROXEN 500 MG/1
500 TABLET ORAL 2 TIMES DAILY WITH MEALS
Qty: 20 TAB | Refills: 0 | Status: SHIPPED | OUTPATIENT
Start: 2018-11-14 | End: 2018-11-24

## 2018-11-14 RX ADMIN — NAPROXEN 500 MG: 250 TABLET ORAL at 10:27

## 2018-11-14 NOTE — DISCHARGE INSTRUCTIONS
Ascites: Care Instructions  Your Care Instructions  Ascites (say \"uh-SY-teez\") is a buildup of extra fluid in the belly. It can cause your belly to swell. It can also make it hard for you to breathe. Many diseases can cause ascites. But most people who get it have a liver problem. When the liver gets damaged, it can cause fluid to back up from the liver or from blood vessels. Then this fluid builds up in the belly. Your doctor may take a sample of the fluid in your belly with a thin needle. The sample is then tested to help find out the cause of the ascites. Treatment may include medicine. It may also include changing the way you eat so you don't eat a lot of salt. If your ascites is very bad and hard to treat, your doctor may need to use a needle to take out the fluid. Follow-up care is a key part of your treatment and safety. Be sure to make and go to all appointments, and call your doctor if you are having problems. It's also a good idea to know your test results and keep a list of the medicines you take. How can you care for yourself at home? · Be safe with medicines. Take your medicines exactly as prescribed. Call your doctor if you have any problems with your medicine. You will get more details on the specific medicines your doctor prescribes. · Eat low-salt foods, and don't add salt to your food. If you eat a lot of salt, it's harder to get rid of the extra fluid. Salt is in many prepared foods. These include rodriguez, canned foods, snack foods, sauces, and soups. Look for products that are low-sodium or have reduced salt. · Do not drink any alcohol until you are all better. Alcohol will damage the liver more. If your liver disease is caused by drinking alcohol, do not drink alcohol at all. Tell your doctor if you need help to quit. Counseling, support groups, and sometimes medicines can help you stay sober. · Talk to your doctor before you take any other medicines.  These include over-the-counter medicines, vitamins, and herbal products. · Make sure your doctor knows all the medicines you take. Some medicines, such as acetaminophen (Tylenol), can make liver problems worse. When should you call for help? Call 911 anytime you think you may need emergency care. For example, call if:    · You have trouble breathing.     · You vomit blood or what looks like coffee grounds.    Call your doctor now or seek immediate medical care if:    · You have new or worse belly pain.     · You have a fever.    Watch closely for changes in your health, and be sure to contact your doctor if:    · You have any problems.     · Your belly is getting bigger.     · You are gaining weight. Where can you learn more? Go to http://ascencion-kenzie.info/. Enter B970 in the search box to learn more about \"Ascites: Care Instructions. \"  Current as of: March 28, 2018  Content Version: 11.8  © 3865-5334 [employer], [employer]. Care instructions adapted under license by [employer] (which disclaims liability or warranty for this information). If you have questions about a medical condition or this instruction, always ask your healthcare professional. [address] any warranty or liability for your use of this information.

## 2018-11-14 NOTE — ED TRIAGE NOTES
Patient arrives via EMS with \"severe abdominal pain that feels like contractions\" for 3 months. Patient states no possibility of pregnancy since hysterectomy. Patient also states her ankles have started swelling in the last few days.

## 2018-11-14 NOTE — ED PROVIDER NOTES
EMERGENCY DEPARTMENT HISTORY AND PHYSICAL EXAM 
 
9:54 AM 
 
 
Date: 11/14/2018 Patient Name: Familia Hlal History of Presenting Illness Chief Complaint Patient presents with  Abdominal Pain Ankle Swelling History Provided By: Patient Additional History (Context): Familia Hall is a 26 y.o. female with Hep C who presents with persistent and worsening abd distention since her last ED visit around 10 days ago. Reports associated vomiting and \"contraction\" feelings of discomfort in her RUQ and LUQ, but these are not a new sxs. Awaiting GI f/u in 1 month. PCP: Jose Brantley MD 
 
Current Outpatient Medications Medication Sig Dispense Refill  naproxen (NAPROSYN) 500 mg tablet Take 1 Tab by mouth two (2) times daily (with meals) for 10 days. 20 Tab 0  
 ondansetron (ZOFRAN ODT) 4 mg disintegrating tablet Take 1-2 tablets every 6-8 hours as needed for nausea and vomiting. 10 Tab 0  
 albuterol (PROVENTIL HFA, VENTOLIN HFA, PROAIR HFA) 90 mcg/actuation inhaler Take 1 Puff by inhalation every four (4) hours as needed for Wheezing. 1 Inhaler 0  
 dextromethorphan-guaiFENesin (ROBITUSSIN-DM)  mg/5 mL syrup Take 10 mL by mouth every six (6) hours as needed for Cough. 240 mL 0  
 mirtazapine (REMERON) 45 mg tablet Take 1 Tab by mouth nightly. 30 Tab 11  
 ondansetron hcl (ZOFRAN) 8 mg tablet Take 1 Tab by mouth every eight (8) hours as needed for Nausea. 30 Tab 1  
 atenolol (TENORMIN) 50 mg tablet Take 1 Tab by mouth daily. 30 Tab 11  
 gabapentin (NEURONTIN) 300 mg capsule Take 1 Cap by mouth three (3) times daily. 90 Cap 11  
 busPIRone (BUSPAR) 15 mg tablet Take 1 Tab by mouth three (3) times daily as needed. 90 Tab 11  
 QUEtiapine (SEROQUEL) 300 mg tablet Take 2 Tabs by mouth nightly. 60 Tab 11  
 hydrocortisone (PROCTOSOL HC) 2.5 % rectal cream Insert  into rectum three (3) times daily as needed for Hemorrhoids.  30 g 2  
  baclofen (LIORESAL) 20 mg tablet Take 1 Tab by mouth three (3) times daily. 90 Tab 11  
 acyclovir (ZOVIRAX) 400 mg tablet Take 1 Tab by mouth every four (4) hours (while awake). 40 Tab 1  
 sertraline (ZOLOFT) 100 mg tablet Take 2 Tabs by mouth daily. 60 Tab 11  
 budesonide-formoterol (SYMBICORT) 160-4.5 mcg/actuation HFAA Take 2 Puffs by inhalation two (2) times a day. 1 Inhaler 2  
 albuterol sulfate (PROAIR RESPICLICK) 90 mcg/actuation aepb Take 2 Puffs by inhalation every four (4) hours as needed. 1 Inhaler 0 Past History Past Medical History: 
Past Medical History:  
Diagnosis Date  Anal fissure  Hep C w/o coma, chronic (HCC)  Methamphetamine abuse in remission ([facility]  Schizo-affective psychosis ([facility]  Tachycardia Past Surgical History: 
Past Surgical History:  
Procedure Laterality Date  HX APPENDECTOMY  HX CHOLECYSTECTOMY  HX COLONOSCOPY  2014  HX HYSTERECTOMY  HX OTHER SURGICAL    
 right middle lobe wedge resection Family History: 
Family History Problem Relation Age of Onset  Cancer Mother 62  
     lung  Alcohol abuse Mother  Heart Disease Father  Alcohol abuse Father Social History: 
Social History Tobacco Use  Smoking status: Current Every Day Smoker Packs/day: 1.50 Years: 20.00 Pack years: 30.00 Types: Cigarettes  Smokeless tobacco: Never Used Substance Use Topics  Alcohol use: No  
 Drug use: No  
 
 
Allergies: Allergies Allergen Reactions  Sulfa (Sulfonamide Antibiotics) Anaphylaxis  Amoxicillin Hives  Toradol [Ketorolac] Hives  Vistaril [Hydroxyzine Pamoate] Unknown (comments) Review of Systems Review of Systems Constitutional: Negative for chills and fever. HENT: Negative for ear pain and sore throat. Eyes: Negative for pain and visual disturbance. Respiratory: Negative for cough and shortness of breath. Cardiovascular: Negative for chest pain and palpitations. Gastrointestinal: Positive for abdominal distention, abdominal pain, nausea and vomiting. Negative for diarrhea. Genitourinary: Negative for flank pain. Musculoskeletal: Negative for back pain and neck pain. BLE edema Neurological: Negative for syncope and headaches. Psychiatric/Behavioral: Negative for agitation. The patient is not nervous/anxious. Physical Exam  
 
Visit Vitals /78 (BP 1 Location: Left arm, BP Patient Position: At rest) Pulse 90 Temp 98.7 °F (37.1 °C) Resp 16 Ht 5' 2\" (1.575 m) Wt 70.3 kg (155 lb) SpO2 98% BMI 28.35 kg/m² Physical Exam  
Constitutional: She is oriented to person, place, and time. She appears well-developed and well-nourished. No distress. HENT:  
Head: Normocephalic and atraumatic. Mouth/Throat: Oropharynx is clear and moist.  
Eyes: Pupils are equal, round, and reactive to light. No scleral icterus. Neck: Neck supple. No tracheal deviation present. Cardiovascular: Normal rate and regular rhythm. No murmur heard. Pulmonary/Chest: Effort normal and breath sounds normal. No respiratory distress. Abdominal: Soft. She exhibits distension. She exhibits no mass. There is no tenderness. There is no rebound and no guarding.  
+ fluid shift with small amount of ascites seen on bedside ultrasound Musculoskeletal: Normal range of motion. She exhibits no deformity. Trace BLE edema Neurological: She is alert and oriented to person, place, and time. No cranial nerve deficit. Coordination normal.  
No asterixis Skin: Skin is warm and dry. No rash noted. She is not diaphoretic. No erythema. Psychiatric: She has a normal mood and affect. Nursing note and vitals reviewed. Diagnostic Study Results Labs - Labs Reviewed CBC WITH AUTOMATED DIFF - Abnormal; Notable for the following components:  
    Result Value MCV 97.2 (*) MPV 9.1 (*) All other components within normal limits METABOLIC PANEL, COMPREHENSIVE - Abnormal; Notable for the following components:  
 Chloride 109 (*) Glucose 106 (*)   
 BUN/Creatinine ratio 9 (*) Calcium 8.4 (*) All other components within normal limits LIPASE Radiologic Studies - No orders to display Medical Decision Making I am the first provider for this patient. I reviewed the vital signs, available nursing notes, past medical history, past surgical history, family history and social history. Vital Signs-Reviewed the patient's vital signs. Records Reviewed: Nursing Notes brief look at last ED visit (Time of Review: 9:54 AM) MDM, Progress Notes, Reevaluation, and Consults: DDX: Hep C, ascites, IBS/IBD, cirrhosis, steatosis, not concerned for SBP (no fever, abd pain on exam with normal vitals) Presents with abd distention and persistent worsening discomfort since last ED visit. Dx with Hep C earlier this year. Not on medication, awaiting GI f/u in 1 month. Currently taking zofran and bentyl with some relief. Benign abd exam, no concern for SBP or obstruction. Clinically appears to have ascites but only small amount seen on bedside US, may be amenable to interventional radiology paracentesis, discussed with Dr. Andrew Gambino. Labs show no significant abnormality. No emergency needs for any procedure or additional imaging today. Given NSAID for pain relief, pt denies allergy to oral NSAID. ED Course as of Nov 14 1031 Wed Nov 14, 2018  
5476 Dr. Andrew Gambino will schedule patient for therapeutic paracentesis. Discussed by phone.   [facility] Russ ED Course User Index [KG] Marty Favor, DO The patient was given: 
Medications  
naproxen (NAPROSYN) tablet 500 mg (500 mg Oral Given 11/14/18 1027) Diagnosis Clinical Impression: 1. Abdominal distension 2. Other ascites Disposition: home Follow-up Information Follow up With Specialties Details Why Contact [facility] EMERGENCY DEPT Emergency Medicine  If symptoms worsen [address] 
[phone] Chaitanya Guzman MD Family Practice, Internal Medicine  Check in with Dr. Simone Whitaker who plans to schedule you for a therapeutic paracentesis  [address] [facility] [address] [id number] [place] [address] [phone] Please follow-up with gastroenterology as scheduled. Medication List  
  
START taking these medications   
naproxen 500 mg tablet Commonly known as:  NAPROSYN Take 1 Tab by mouth two (2) times daily (with meals) for 10 days. ASK your doctor about these medications   
acyclovir 400 mg tablet Commonly known as:  ZOVIRAX Take 1 Tab by mouth every four (4) hours (while awake). * albuterol sulfate 90 mcg/actuation Aepb Commonly known as:  Dilia Hones Take 2 Puffs by inhalation every four (4) hours as needed. * albuterol 90 mcg/actuation inhaler Commonly known as:  PROVENTIL HFA, VENTOLIN HFA, PROAIR HFA Take 1 Puff by inhalation every four (4) hours as needed for Wheezing. atenolol 50 mg tablet Commonly known as:  TENORMIN Take 1 Tab by mouth daily. baclofen 20 mg tablet Commonly known as:  LIORESAL Take 1 Tab by mouth three (3) times daily. budesonide-formoterol 160-4.5 mcg/actuation Hfaa Commonly known as:  SYMBICORT Take 2 Puffs by inhalation two (2) times a day. busPIRone 15 mg tablet Commonly known as:  BUSPAR Take 1 Tab by mouth three (3) times daily as needed. dextromethorphan-guaiFENesin  mg/5 mL syrup Commonly known as:  ROBITUSSIN-DM Take 10 mL by mouth every six (6) hours as needed for Cough. gabapentin 300 mg capsule Commonly known as:  NEURONTIN Take 1 Cap by mouth three (3) times daily. hydrocortisone 2.5 % rectal cream 
Commonly known as:  PROCTOSOL HC Insert  into rectum three (3) times daily as needed for Hemorrhoids. mirtazapine 45 mg tablet Commonly known as:  Wynn Agent Take 1 Tab by mouth nightly. ondansetron 4 mg disintegrating tablet Commonly known as:  ZOFRAN ODT Take 1-2 tablets every 6-8 hours as needed for nausea and vomiting. ondansetron hcl 8 mg tablet Commonly known as:  Sonya Loges Take 1 Tab by mouth every eight (8) hours as needed for Nausea. QUEtiapine 300 mg tablet Commonly known as:  SEROquel Take 2 Tabs by mouth nightly. sertraline 100 mg tablet Commonly known as:  ZOLOFT Take 2 Tabs by mouth daily. * This list has 2 medication(s) that are the same as other medications prescribed for you. Read the directions carefully, and ask your doctor or other care provider to review them with you. Where to Get Your Medications Information about where to get these medications is not yet available Ask your nurse or doctor about these medications · naproxen 500 mg tablet 
  
 
_______________________________ Scribe Attestation Beverley Paredes acting as a scribe for and in the presence of Mariann Duarte DO November 14, 2018 at 10:12 AM 
    
Provider Attestation:     
I personally performed the services described in the documentation, reviewed the documentation, as recorded by the scribe in my presence, and it accurately and completely records my words and actions.  November 14, 2018 at 10:12 AM - Mariann Duarte DO

## 2018-11-19 DIAGNOSIS — K74.60 CIRRHOSIS OF LIVER WITH ASCITES, UNSPECIFIED HEPATIC CIRRHOSIS TYPE (HCC): Primary | ICD-10-CM

## 2018-11-19 DIAGNOSIS — R18.8 CIRRHOSIS OF LIVER WITH ASCITES, UNSPECIFIED HEPATIC CIRRHOSIS TYPE (HCC): Primary | ICD-10-CM

## 2018-11-20 DIAGNOSIS — F41.9 ANXIETY: ICD-10-CM

## 2018-11-20 RX ORDER — DICYCLOMINE HYDROCHLORIDE 10 MG/1
CAPSULE ORAL
Qty: 20 CAP | Refills: 0 | Status: SHIPPED | OUTPATIENT
Start: 2018-11-20 | End: 2018-11-30 | Stop reason: SDUPTHER

## 2018-11-21 ENCOUNTER — OFFICE VISIT (OUTPATIENT)
Dept: FAMILY MEDICINE CLINIC | Age: 33
End: 2018-11-21

## 2018-11-21 VITALS
OXYGEN SATURATION: 97 % | WEIGHT: 156 LBS | SYSTOLIC BLOOD PRESSURE: 119 MMHG | RESPIRATION RATE: 18 BRPM | HEIGHT: 62 IN | DIASTOLIC BLOOD PRESSURE: 76 MMHG | HEART RATE: 91 BPM | BODY MASS INDEX: 28.71 KG/M2 | TEMPERATURE: 98.7 F

## 2018-11-21 DIAGNOSIS — R18.8 CIRRHOSIS OF LIVER WITH ASCITES, UNSPECIFIED HEPATIC CIRRHOSIS TYPE (HCC): ICD-10-CM

## 2018-11-21 DIAGNOSIS — R10.11 RUQ ABDOMINAL PAIN: ICD-10-CM

## 2018-11-21 DIAGNOSIS — J41.1 MUCOPURULENT CHRONIC BRONCHITIS (HCC): Primary | ICD-10-CM

## 2018-11-21 DIAGNOSIS — K74.60 CIRRHOSIS OF LIVER WITH ASCITES, UNSPECIFIED HEPATIC CIRRHOSIS TYPE (HCC): ICD-10-CM

## 2018-11-21 DIAGNOSIS — F41.9 ANXIETY: ICD-10-CM

## 2018-11-21 RX ORDER — ALBUTEROL SULFATE 0.83 MG/ML
2.5 SOLUTION RESPIRATORY (INHALATION)
Qty: 60 EACH | Refills: 0 | Status: SHIPPED | OUTPATIENT
Start: 2018-11-21

## 2018-11-21 RX ORDER — OXYCODONE HYDROCHLORIDE 5 MG/1
5 TABLET ORAL
Qty: 12 TAB | Refills: 0 | Status: SHIPPED | OUTPATIENT
Start: 2018-11-21 | End: 2018-11-30 | Stop reason: SDUPTHER

## 2018-11-21 NOTE — PROGRESS NOTES
Ktae Cheney is a 26 y.o.  female and presents with    Chief Complaint   Patient presents with    Abdominal Pain     Subjective:  Abdominal Pain  Patient complains of abdominal pain. The pain is described as cramping, and is 8/10 in intensity. Pain is located in the RUQ, LUQ without radiation. Onset was several weeks ago. Symptoms have been gradually worsening since. Aggravating factors: movement. Alleviating factors: recumbency. Associated symptoms: anorexia, constipation and nausea. The patient denies flatus and frequency. Patient Active Problem List   Diagnosis Code    Anxiety F41.9    Neuropathy G62.9    Arnold-Chiari malformation ([facility]) Q07.00    Bilateral carpal tunnel syndrome G56.03     Patient Active Problem List    Diagnosis Date Noted    Anxiety 09/28/2018    Neuropathy 09/28/2018    Arnold-Chiari malformation ([facility] 09/28/2018    Bilateral carpal tunnel syndrome 09/28/2018     Current Outpatient Medications   Medication Sig Dispense Refill    dicyclomine (BENTYL) 10 mg capsule TAKE ONE CAPSULE BY MOUTH FOUR TIMES A DAY FOR 5 DAYS 20 Cap 0    naproxen (NAPROSYN) 500 mg tablet Take 1 Tab by mouth two (2) times daily (with meals) for 10 days. 20 Tab 0    ondansetron (ZOFRAN ODT) 4 mg disintegrating tablet Take 1-2 tablets every 6-8 hours as needed for nausea and vomiting. 10 Tab 0    albuterol (PROVENTIL HFA, VENTOLIN HFA, PROAIR HFA) 90 mcg/actuation inhaler Take 1 Puff by inhalation every four (4) hours as needed for Wheezing. 1 Inhaler 0    dextromethorphan-guaiFENesin (ROBITUSSIN-DM)  mg/5 mL syrup Take 10 mL by mouth every six (6) hours as needed for Cough. 240 mL 0    mirtazapine (REMERON) 45 mg tablet Take 1 Tab by mouth nightly. 30 Tab 11    ondansetron hcl (ZOFRAN) 8 mg tablet Take 1 Tab by mouth every eight (8) hours as needed for Nausea. 30 Tab 1    atenolol (TENORMIN) 50 mg tablet Take 1 Tab by mouth daily.  30 Tab 11    gabapentin (NEURONTIN) 300 mg capsule Take 1 Cap by mouth three (3) times daily. 90 Cap 11    busPIRone (BUSPAR) 15 mg tablet Take 1 Tab by mouth three (3) times daily as needed. 90 Tab 11    QUEtiapine (SEROQUEL) 300 mg tablet Take 2 Tabs by mouth nightly. 60 Tab 11    hydrocortisone (PROCTOSOL HC) 2.5 % rectal cream Insert  into rectum three (3) times daily as needed for Hemorrhoids. 30 g 2    baclofen (LIORESAL) 20 mg tablet Take 1 Tab by mouth three (3) times daily. 90 Tab 11    acyclovir (ZOVIRAX) 400 mg tablet Take 1 Tab by mouth every four (4) hours (while awake). 40 Tab 1    sertraline (ZOLOFT) 100 mg tablet Take 2 Tabs by mouth daily. 60 Tab 11    budesonide-formoterol (SYMBICORT) 160-4.5 mcg/actuation HFAA Take 2 Puffs by inhalation two (2) times a day. 1 Inhaler 2    albuterol sulfate (PROAIR RESPICLICK) 90 mcg/actuation aepb Take 2 Puffs by inhalation every four (4) hours as needed. 1 Inhaler 0     Allergies   Allergen Reactions    Sulfa (Sulfonamide Antibiotics) Anaphylaxis    Amoxicillin Hives    Toradol [Ketorolac] Hives    Vistaril [Hydroxyzine Pamoate] Unknown (comments)     Past Medical History:   Diagnosis Date    Anal fissure     Hep C w/o coma, chronic (HCC)     Methamphetamine abuse in remission (HCC)     Schizo-affective psychosis (HCC)     Tachycardia      Past Surgical History:   Procedure Laterality Date    HX APPENDECTOMY      HX CHOLECYSTECTOMY      HX COLONOSCOPY  2014    HX HYSTERECTOMY      HX OTHER SURGICAL      right middle lobe wedge resection     Family History   Problem Relation Age of Onset    Cancer Mother 62        lung    Alcohol abuse Mother     Heart Disease Father     Alcohol abuse Father      Social History     Tobacco Use    Smoking status: Current Every Day Smoker     Packs/day: 1.50     Years: 20.00     Pack years: 30.00     Types: Cigarettes    Smokeless tobacco: Never Used   Substance Use Topics    Alcohol use: No       ROS   Constitutional: Negative for chills and fever. HENT: Negative for ear pain and sore throat. Eyes: Negative for pain and visual disturbance. Respiratory: Negative for cough and shortness of breath. Cardiovascular: Negative for chest pain and palpitations. Gastrointestinal: Positive for abdominal distention, abdominal pain, nausea and vomiting. Negative for diarrhea. Genitourinary: Negative for flank pain. Musculoskeletal: Negative for back pain and neck pain. Neurological: Negative for syncope and headaches. Psychiatric/Behavioral: Negative for agitation. The patient     All other systems reviewed and are negative. Objective:  Vitals:    11/21/18 1249   BP: 119/76   Pulse: 91   Resp: 18   Temp: 98.7 °F (37.1 °C)   TempSrc: Oral   SpO2: 97%   Weight: 156 lb (70.8 kg)   Height: 5' 2\" (1.575 m)   PainSc:   8   PainLoc: Abdomen     Constitutional: She is oriented to person, place, and time. She appears well-developed and well-nourished. No distress. HENT:   Head: Normocephalic and atraumatic. Mouth/Throat: Oropharynx is clear and moist.   Eyes: Pupils are equal, round, and reactive to light. No scleral icterus. Neck: Neck supple. No tracheal deviation present. Cardiovascular: Normal rate and regular rhythm. No murmur heard. Pulmonary/Chest: Effort normal and breath sounds normal. No respiratory distress. Abdominal: Soft. She exhibits distension. She exhibits no mass. There is no tenderness. There is no rebound and no guarding.   + fluid shift with small amount of ascites seen on bedside ultrasound   Musculoskeletal: Normal range of motion. She exhibits no deformity. Trace BLE edema   Neurological: She is alert and oriented to person, place, and time. No cranial nerve deficit. Coordination normal.   No asterixis    Skin: Skin is warm and dry. No rash noted. She is not diaphoretic. No erythema. Psychiatric: anxious    LABS     TESTS  abd US   IMPRESSION:        1.  Hepatomegaly and diffusely increased hepatic parenchymal echogenicity  compatible with steatosis.     2. Prior cholecystectomy    Assessment/Plan:    1. Mucopurulent chronic bronchitis (HCC)  Inhaled therapy with nebulizer ordered  - AMB SUPPLY ORDER  - albuterol (PROVENTIL VENTOLIN) 2.5 mg /3 mL (0.083 %) nebulizer solution; 3 mL by Nebulization route every four (4) hours as needed for Wheezing. Dispense: 60 Each; Refill: 0    2. Cirrhosis of liver with ascites, unspecified hepatic cirrhosis type ([facility]  appt scheduled with GI    3. RUQ abdominal pain  Pain management with small amount of opioid due to addictive behavior  - oxyCODONE IR (ROXICODONE) 5 mg immediate release tablet; Take 1 Tab by mouth every four (4) hours as needed for Pain. Max Daily Amount: 30 mg. Dispense: 12 Tab; Refill: 0    4. Anxiety  Continue current medications      Lab review: labs reviewed, I note that comprehensive metabolic panel mildly abnormal but acceptable      I have discussed the diagnosis with the patient and the intended plan as seen in the above orders. The patient has received an after-visit summary and questions were answered concerning future plans. I have discussed medication side effects and warnings with the patient as well. I have reviewed the plan of care with the patient, accepted their input and they are in agreement with the treatment goals. Follow-up Disposition:  Return if symptoms worsen or fail to improve.

## 2018-11-21 NOTE — PROGRESS NOTES
Chief Complaint   Patient presents with    Abdominal Pain     1. Have you been to the ER, urgent care clinic since your last visit? Hospitalized since your last visit? Yes When: 10/27,11/05,11/14 Where: [address] Reason for visit: Abdominal pain    2. Have you seen or consulted any other health care providers outside of the [address] [place] [address] since your last visit? Include any pap smears or colon screening.  No

## 2018-11-30 ENCOUNTER — OFFICE VISIT (OUTPATIENT)
Dept: FAMILY MEDICINE CLINIC | Age: 33
End: 2018-11-30

## 2018-11-30 ENCOUNTER — HOSPITAL ENCOUNTER (OUTPATIENT)
Dept: LAB | Age: 33
Discharge: HOME | End: 2018-11-30
Payer: MEDICAID

## 2018-11-30 VITALS
BODY MASS INDEX: 30 KG/M2 | OXYGEN SATURATION: 96 % | HEART RATE: 88 BPM | TEMPERATURE: 97.1 F | DIASTOLIC BLOOD PRESSURE: 75 MMHG | HEIGHT: 62 IN | RESPIRATION RATE: 16 BRPM | SYSTOLIC BLOOD PRESSURE: 115 MMHG | WEIGHT: 163 LBS

## 2018-11-30 DIAGNOSIS — J41.1 MUCOPURULENT CHRONIC BRONCHITIS (HCC): ICD-10-CM

## 2018-11-30 DIAGNOSIS — R10.11 RUQ ABDOMINAL PAIN: ICD-10-CM

## 2018-11-30 DIAGNOSIS — R11.0 NAUSEA: Primary | ICD-10-CM

## 2018-11-30 DIAGNOSIS — F41.9 ANXIETY: ICD-10-CM

## 2018-11-30 PROCEDURE — 80307 DRUG TEST PRSMV CHEM ANLYZR: CPT

## 2018-11-30 RX ORDER — ALBUTEROL SULFATE 90 UG/1
1 AEROSOL, METERED RESPIRATORY (INHALATION)
Qty: 1 INHALER | Refills: 0 | Status: SHIPPED | OUTPATIENT
Start: 2018-11-30 | End: 2019-05-13 | Stop reason: SDUPTHER

## 2018-11-30 RX ORDER — ONDANSETRON 4 MG/1
TABLET, ORALLY DISINTEGRATING ORAL
Qty: 30 TAB | Refills: 0 | Status: SHIPPED | OUTPATIENT
Start: 2018-11-30 | End: 2019-05-09 | Stop reason: SDUPTHER

## 2018-11-30 RX ORDER — OXYCODONE HYDROCHLORIDE 5 MG/1
5 TABLET ORAL
Qty: 12 TAB | Refills: 0 | Status: SHIPPED | OUTPATIENT
Start: 2018-11-30 | End: 2019-02-01 | Stop reason: SDUPTHER

## 2018-11-30 RX ORDER — DICYCLOMINE HYDROCHLORIDE 10 MG/1
CAPSULE ORAL
Qty: 120 CAP | Refills: 1 | Status: SHIPPED | OUTPATIENT
Start: 2018-11-30 | End: 2019-05-31

## 2018-11-30 NOTE — PROGRESS NOTES
Chief Complaint   Patient presents with    Medication Refill     zofran, oxycodone, Pro air, bentyl     1. Have you been to the ER, urgent care clinic since your last visit? Hospitalized since your last visit? No    2. Have you seen or consulted any other health care providers outside of the [address] [place] [address] since your last visit? Include any pap smears or colon screening.  Yes When: Future appt: 1/3/2019 Where: Gastroenterology

## 2018-11-30 NOTE — PROGRESS NOTES
Hang Patterson is a 26 y.o.  female and presents with    Chief Complaint   Patient presents with    Medication Refill     zofran, oxycodone, Pro air, bentyl     Subjective:  Abdominal Pain  Ms. Florentino Umana returns for medication evaluation. She has started oxycodone for abdominal pain. She is not taking the medication daily and when she uses it she takes half a tablet. She does not have pain today. The pain is described as cramping, and is 8/10 in intensity at its worst. Pain is located in the RUQ, LUQ without radiation. Onset was several weeks ago. Symptoms have been gradually worsening since. Aggravating factors: movement. Alleviating factors: recumbency. Associated symptoms: anorexia, constipation and nausea. The patient denies flatus and frequency. She is requesting refill of zofran, bentyl. Patient Active Problem List   Diagnosis Code    Anxiety F41.9    Neuropathy G62.9    Arnold-Chiari malformation ([facility]) Q07.00    Bilateral carpal tunnel syndrome G56.03     Patient Active Problem List    Diagnosis Date Noted    Anxiety 09/28/2018    Neuropathy 09/28/2018    [facility] 09/28/2018    Bilateral carpal tunnel syndrome 09/28/2018     Current Outpatient Medications   Medication Sig Dispense Refill    albuterol (PROVENTIL VENTOLIN) 2.5 mg /3 mL (0.083 %) nebulizer solution 3 mL by Nebulization route every four (4) hours as needed for Wheezing. 60 Each 0    oxyCODONE IR (ROXICODONE) 5 mg immediate release tablet Take 1 Tab by mouth every four (4) hours as needed for Pain. Max Daily Amount: 30 mg. 12 Tab 0    dicyclomine (BENTYL) 10 mg capsule TAKE ONE CAPSULE BY MOUTH FOUR TIMES A DAY FOR 5 DAYS 20 Cap 0    ondansetron (ZOFRAN ODT) 4 mg disintegrating tablet Take 1-2 tablets every 6-8 hours as needed for nausea and vomiting.  10 Tab 0    albuterol (PROVENTIL HFA, VENTOLIN HFA, PROAIR HFA) 90 mcg/actuation inhaler Take 1 Puff by inhalation every four (4) hours as needed for Wheezing. 1 Inhaler 0    mirtazapine (REMERON) 45 mg tablet Take 1 Tab by mouth nightly. 30 Tab 11    ondansetron hcl (ZOFRAN) 8 mg tablet Take 1 Tab by mouth every eight (8) hours as needed for Nausea. 30 Tab 1    atenolol (TENORMIN) 50 mg tablet Take 1 Tab by mouth daily. 30 Tab 11    gabapentin (NEURONTIN) 300 mg capsule Take 1 Cap by mouth three (3) times daily. 90 Cap 11    busPIRone (BUSPAR) 15 mg tablet Take 1 Tab by mouth three (3) times daily as needed. 90 Tab 11    QUEtiapine (SEROQUEL) 300 mg tablet Take 2 Tabs by mouth nightly. 60 Tab 11    hydrocortisone (PROCTOSOL HC) 2.5 % rectal cream Insert  into rectum three (3) times daily as needed for Hemorrhoids. 30 g 2    baclofen (LIORESAL) 20 mg tablet Take 1 Tab by mouth three (3) times daily. 90 Tab 11    acyclovir (ZOVIRAX) 400 mg tablet Take 1 Tab by mouth every four (4) hours (while awake). 40 Tab 1    sertraline (ZOLOFT) 100 mg tablet Take 2 Tabs by mouth daily. 60 Tab 11    budesonide-formoterol (SYMBICORT) 160-4.5 mcg/actuation HFAA Take 2 Puffs by inhalation two (2) times a day. 1 Inhaler 2    albuterol sulfate (PROAIR RESPICLICK) 90 mcg/actuation aepb Take 2 Puffs by inhalation every four (4) hours as needed.  1 Inhaler 0     Allergies   Allergen Reactions    Sulfa (Sulfonamide Antibiotics) Anaphylaxis    Amoxicillin Hives    Toradol [Ketorolac] Hives    Vistaril [Hydroxyzine Pamoate] Unknown (comments)     Past Medical History:   Diagnosis Date    Anal fissure     Hep C w/o coma, chronic (HCC)     Methamphetamine abuse in remission (HCC)     Schizo-affective psychosis (HCC)     Tachycardia      Past Surgical History:   Procedure Laterality Date    HX APPENDECTOMY      HX CHOLECYSTECTOMY      HX COLONOSCOPY  2014    HX HYSTERECTOMY      HX OTHER SURGICAL      right middle lobe wedge resection     Family History   Problem Relation Age of Onset    Cancer Mother 62        lung    Alcohol abuse Mother     Heart Disease Father     Alcohol abuse Father      Social History     Tobacco Use    Smoking status: Current Every Day Smoker     Packs/day: 1.50     Years: 20.00     Pack years: 30.00     Types: Cigarettes    Smokeless tobacco: Never Used   Substance Use Topics    Alcohol use: No       ROS   Constitutional: Negative for chills and fever. HENT: Negative for ear pain and sore throat.    Eyes: Negative for pain and visual disturbance. Respiratory: Negative for cough and shortness of breath.    Cardiovascular: Negative for chest pain and palpitations. Gastrointestinal: Positive for abdominal distention, abdominal pain, nausea and vomiting. Negative for diarrhea. Genitourinary: Negative for flank pain. Musculoskeletal: Negative for back pain and neck pain.   Neurological: Negative for syncope and headaches. Psychiatric/Behavioral: she is happy to go and visit her son after 10 years. She is flying to [place] in 5 days to be reunited with him. She has concentration difficulty; her irritation is improved with the zoloft. All other systems reviewed and are negative. Objective:  Vitals:    11/30/18 0902   BP: 115/75   Pulse: 88   Resp: 16   Temp: 97.1 °F (36.2 °C)   TempSrc: Oral   SpO2: 96%   Weight: 163 lb (73.9 kg)   Height: 5' 2\" (1.575 m)   PainSc:   0 - No pain       alert, well appearing, and in no distress, oriented to person, place, and time and overweight  Mental status - anxious  Chest - clear to auscultation, no wheezes, rales or rhonchi, symmetric air entry  Heart - normal rate, regular rhythm, normal S1, S2, no murmurs, rubs, clicks or gallops  Abdomen - soft, nontender, + mild distension; no masses   Neuro - no asterixis    Assessment/Plan:    1. RUQ abdominal pain  Using medication intermittently for severe pain; she has #3 remaining; she has appointment with GI in 5 weeks  - oxyCODONE IR (ROXICODONE) 5 mg immediate release tablet;  Take 1 Tab by mouth every four (4) hours as needed for Pain. Max Daily Amount: 30 mg. Dispense: 12 Tab; Refill: 0    2. Anxiety  Continue medication for diarrhea  - dicyclomine (BENTYL) 10 mg capsule; TAKE ONE CAPSULE BY MOUTH FOUR TIMES A DAY  Dispense: 120 Cap; Refill: 1    3. Nausea  Continue antiemetic  - ondansetron (ZOFRAN ODT) 4 mg disintegrating tablet; Take 1 tablet every 8 hours as needed for nausea and vomiting. Dispense: 30 Tab; Refill: 0    4. Mucopurulent chronic bronchitis (HCC)  Continue inhaled therapy  - albuterol (PROVENTIL HFA, VENTOLIN HFA, PROAIR HFA) 90 mcg/actuation inhaler; Take 1 Puff by inhalation every four (4) hours as needed for Wheezing. Dispense: 1 Inhaler; Refill: 0      Lab review: orders written for new lab studies as appropriate; see orders      I have discussed the diagnosis with the patient and the intended plan as seen in the above orders. The patient has received an after-visit summary and questions were answered concerning future plans. I have discussed medication side effects and warnings with the patient as well. I have reviewed the plan of care with the patient, accepted their input and they are in agreement with the treatment goals. Follow-up Disposition:  Return if symptoms worsen or fail to improve.

## 2018-12-01 LAB
AMPHETAMINES UR QL SCN: NEGATIVE NG/ML
BARBITURATES UR QL SCN: NEGATIVE NG/ML
BENZODIAZ UR QL SCN: NEGATIVE NG/ML
BUPRENORPHINE UR QL: NEGATIVE NG/ML
BZE UR QL SCN: NEGATIVE NG/ML
CANNABINOIDS UR QL SCN: POSITIVE NG/ML
CREAT UR-MCNC: 146.3 MG/DL (ref 20–300)
METHADONE UR QL SCN: NEGATIVE NG/ML
OPIATES UR QL SCN: NEGATIVE NG/ML
OXYCODONE+OXYMORPHONE UR QL SCN: NEGATIVE NG/ML
PCP UR QL: NEGATIVE NG/ML
PH UR: 5.7 [PH] (ref 4.5–8.9)
PLEASE NOTE:, 733163: ABNORMAL
PROPOXYPH UR QL SCN: NEGATIVE NG/ML

## 2018-12-27 DIAGNOSIS — J41.1 MUCOPURULENT CHRONIC BRONCHITIS (HCC): ICD-10-CM

## 2018-12-27 RX ORDER — BUDESONIDE AND FORMOTEROL FUMARATE DIHYDRATE 160; 4.5 UG/1; UG/1
AEROSOL RESPIRATORY (INHALATION)
Qty: 3 INHALER | Refills: 1 | Status: SHIPPED | OUTPATIENT
Start: 2018-12-27 | End: 2019-05-13 | Stop reason: SDUPTHER

## 2019-01-18 ENCOUNTER — OFFICE VISIT (OUTPATIENT)
Dept: FAMILY MEDICINE CLINIC | Age: 34
End: 2019-01-18

## 2019-01-18 VITALS
HEART RATE: 81 BPM | OXYGEN SATURATION: 94 % | SYSTOLIC BLOOD PRESSURE: 106 MMHG | TEMPERATURE: 98.8 F | DIASTOLIC BLOOD PRESSURE: 69 MMHG | WEIGHT: 168 LBS | RESPIRATION RATE: 18 BRPM | HEIGHT: 62 IN | BODY MASS INDEX: 30.91 KG/M2

## 2019-01-18 DIAGNOSIS — F41.9 ANXIETY: ICD-10-CM

## 2019-01-18 DIAGNOSIS — G56.03 BILATERAL CARPAL TUNNEL SYNDROME: ICD-10-CM

## 2019-01-18 DIAGNOSIS — K52.9 COLITIS: Primary | ICD-10-CM

## 2019-01-18 NOTE — PROGRESS NOTES
Mary Chinchilla is a 26 y.o.  female and presents with    Chief Complaint   Patient presents with   [facility] Follow Up     Subjective:  Ms. Arin Tracy presents for f/u after hospitalization for pneumonia; she was admitted to [facility] 31 Dec 18 and discharge 2 Jan 19. She reports that cough improved. She was diagnosed with colitis and has f/u with GI in 2 weeks. She denies diarrhea. She denies fever or chills. She denies pain. Anxiety Review:  Patient is seen for anxiety disorder. Current treatment includes remeron, zoloft and buspar and individual therapy. Ongoing symptoms include: racing thoughts, psychomotor agitation, feelings of losing control, difficulty concentrating. Patient denies: palpitations, sweating, chest pain, shortness of breath, suicidal ideation. Reported side effects from the treatment: none. Patient Active Problem List   Diagnosis Code    Anxiety F41.9    Neuropathy G62.9    Arnold-Chiari malformation ([facility]) Q07.00    Bilateral carpal tunnel syndrome G56.03     Patient Active Problem List    Diagnosis Date Noted    Anxiety 09/28/2018    Neuropathy 09/28/2018    [facility] 09/28/2018    Bilateral carpal tunnel syndrome 09/28/2018     Current Outpatient Medications   Medication Sig Dispense Refill    SYMBICORT 160-4.5 mcg/actuation HFAA INHALE 2 PUFFS BY MOUTH 2 TIMES A DAY 3 Inhaler 1    oxyCODONE IR (ROXICODONE) 5 mg immediate release tablet Take 1 Tab by mouth every four (4) hours as needed for Pain. Max Daily Amount: 30 mg. 12 Tab 0    ondansetron (ZOFRAN ODT) 4 mg disintegrating tablet Take 1 tablet every 8 hours as needed for nausea and vomiting. 30 Tab 0    albuterol (PROVENTIL HFA, VENTOLIN HFA, PROAIR HFA) 90 mcg/actuation inhaler Take 1 Puff by inhalation every four (4) hours as needed for Wheezing.  1 Inhaler 0    albuterol (PROVENTIL VENTOLIN) 2.5 mg /3 mL (0.083 %) nebulizer solution 3 mL by Nebulization route every four (4) hours as needed for Wheezing. 60 Each 0    mirtazapine (REMERON) 45 mg tablet Take 1 Tab by mouth nightly. 30 Tab 11    ondansetron hcl (ZOFRAN) 8 mg tablet Take 1 Tab by mouth every eight (8) hours as needed for Nausea. 30 Tab 1    atenolol (TENORMIN) 50 mg tablet Take 1 Tab by mouth daily. 30 Tab 11    gabapentin (NEURONTIN) 300 mg capsule Take 1 Cap by mouth three (3) times daily. 90 Cap 11    busPIRone (BUSPAR) 15 mg tablet Take 1 Tab by mouth three (3) times daily as needed. 90 Tab 11    QUEtiapine (SEROQUEL) 300 mg tablet Take 2 Tabs by mouth nightly. 60 Tab 11    hydrocortisone (PROCTOSOL HC) 2.5 % rectal cream Insert  into rectum three (3) times daily as needed for Hemorrhoids. 30 g 2    baclofen (LIORESAL) 20 mg tablet Take 1 Tab by mouth three (3) times daily. 90 Tab 11    acyclovir (ZOVIRAX) 400 mg tablet Take 1 Tab by mouth every four (4) hours (while awake). 40 Tab 1    sertraline (ZOLOFT) 100 mg tablet Take 2 Tabs by mouth daily. 60 Tab 11    albuterol sulfate (PROAIR RESPICLICK) 90 mcg/actuation aepb Take 2 Puffs by inhalation every four (4) hours as needed. 1 Inhaler 0    dicyclomine (BENTYL) 10 mg capsule TAKE ONE CAPSULE BY MOUTH FOUR TIMES A  Cap 1     Allergies   Allergen Reactions    Sulfa (Sulfonamide Antibiotics) Anaphylaxis    Amoxicillin Hives    Toradol [Ketorolac] Hives    Vistaril [Hydroxyzine Pamoate] Unknown (comments)     Past Medical History:   Diagnosis Date    Anal fissure     Hep C w/o coma, chronic (HCC)     Methamphetamine abuse in remission (HCC)     Schizo-affective psychosis (HCC)     Tachycardia      Past Surgical History:   Procedure Laterality Date    HX APPENDECTOMY      HX CHOLECYSTECTOMY      HX COLONOSCOPY  2014    HX HYSTERECTOMY      HX OTHER SURGICAL      right middle lobe wedge resection       ROS   Constitutional: Negative for chills and fever. HENT: Negative for ear pain and sore throat.    Eyes: Negative for pain and visual disturbance. Respiratory: Negative for cough and shortness of breath.    Cardiovascular: Negative for chest pain and palpitations. Gastrointestinal: Positive for abdominal distention, abdominal pain, nausea and vomiting. Negative for diarrhea. Genitourinary: Negative for flank pain. Musculoskeletal: Negative for back pain and neck pain.   Neurological: Negative for syncope and headaches. Psychiatric/Behavioral:  She has concentration difficulty; her irritation is improved with the zoloft. All other systems reviewed and are negative. Objective:  Vitals:    01/18/19 1526   BP: 106/69   Pulse: 81   Resp: 18   Temp: 98.8 °F (37.1 °C)   TempSrc: Oral   SpO2: 94%   Weight: 168 lb (76.2 kg)   Height: 5' 2\" (1.575 m)   PainSc:   0 - No pain        alert, well appearing, and in no distress, oriented to person, place, and time and overweight  Mental status - anxious  Chest - clear to auscultation, no wheezes, rales or rhonchi, symmetric air entry  Heart - normal rate, regular rhythm, normal S1, S2, no murmurs, rubs, clicks or gallops  Abdomen - soft, nontender, + mild distension; no masses   Neuro - no asterixis    Assessment/Plan:    1. Colitis  Continue treatment and f/u with GI    2. Anxiety  Continue treatment and f/u with mental health    3. Bilateral carpal tunnel syndrome  Night braces      Lab review: no lab studies available for review at time of visit      I have discussed the diagnosis with the patient and the intended plan as seen in the above orders. The patient has received an after-visit summary and questions were answered concerning future plans. I have discussed medication side effects and warnings with the patient as well. I have reviewed the plan of care with the patient, accepted their input and they are in agreement with the treatment goals. Follow-up Disposition:  Return if symptoms worsen or fail to improve.
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
## 2019-02-01 ENCOUNTER — OFFICE VISIT (OUTPATIENT)
Dept: FAMILY MEDICINE CLINIC | Age: 34
End: 2019-02-01

## 2019-02-01 VITALS
OXYGEN SATURATION: 96 % | HEART RATE: 83 BPM | BODY MASS INDEX: 30.91 KG/M2 | DIASTOLIC BLOOD PRESSURE: 73 MMHG | TEMPERATURE: 98.6 F | RESPIRATION RATE: 16 BRPM | WEIGHT: 168 LBS | SYSTOLIC BLOOD PRESSURE: 124 MMHG | HEIGHT: 62 IN

## 2019-02-01 DIAGNOSIS — R10.11 RUQ ABDOMINAL PAIN: ICD-10-CM

## 2019-02-01 DIAGNOSIS — S01.01XA LACERATION OF SCALP, INITIAL ENCOUNTER: ICD-10-CM

## 2019-02-01 DIAGNOSIS — F41.9 ANXIETY: ICD-10-CM

## 2019-02-01 DIAGNOSIS — M54.50 LUMBAR PAIN: Primary | ICD-10-CM
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
## 2019-02-01 RX ORDER — IBUPROFEN 800 MG/1
800 TABLET ORAL
Qty: 30 TAB | Refills: 1 | Status: SHIPPED | OUTPATIENT
Start: 2019-02-01 | End: 2019-04-26 | Stop reason: ALTCHOICE

## 2019-02-01 RX ORDER — OXYCODONE HYDROCHLORIDE 5 MG/1
5 TABLET ORAL
Qty: 12 TAB | Refills: 0 | Status: SHIPPED | OUTPATIENT
Start: 2019-02-01 | End: 2019-02-06 | Stop reason: SDUPTHER
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
## 2019-02-06 ENCOUNTER — OFFICE VISIT (OUTPATIENT)
Dept: FAMILY MEDICINE CLINIC | Age: 34
End: 2019-02-06

## 2019-02-06 VITALS
OXYGEN SATURATION: 97 % | TEMPERATURE: 98.6 F | WEIGHT: 167.4 LBS | BODY MASS INDEX: 30.8 KG/M2 | SYSTOLIC BLOOD PRESSURE: 129 MMHG | HEIGHT: 62 IN | RESPIRATION RATE: 17 BRPM | HEART RATE: 74 BPM | DIASTOLIC BLOOD PRESSURE: 83 MMHG

## 2019-02-06 DIAGNOSIS — M54.50 LUMBAR PAIN: ICD-10-CM

## 2019-02-06 DIAGNOSIS — Z48.02 VISIT FOR SUTURE REMOVAL: Primary | ICD-10-CM

## 2019-02-06 RX ORDER — OXYCODONE HYDROCHLORIDE 5 MG/1
5 TABLET ORAL
Qty: 12 TAB | Refills: 0 | Status: CANCELLED | OUTPATIENT
Start: 2019-02-06

## 2019-02-06 RX ORDER — OXYCODONE HYDROCHLORIDE 5 MG/1
5 TABLET ORAL
Qty: 12 TAB | Refills: 0 | Status: SHIPPED | OUTPATIENT
Start: 2019-02-06 | End: 2019-04-26

## 2019-02-06 RX ORDER — LIDOCAINE 50 MG/G
PATCH TOPICAL
Qty: 30 EACH | Refills: 0 | Status: SHIPPED | OUTPATIENT
Start: 2019-02-06 | End: 2019-05-31
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
## 2019-02-06 NOTE — PROGRESS NOTES
Charlee Simms is a 26 y.o female that is present in the office for a routine appointment for suture removal and back pain. Patient complains of right hip pain. 1. Have you been to the ER, urgent care clinic since your last visit? Hospitalized since your last visit? No    2. Have you seen or consulted any other health care providers outside of the [address] [place] [address] since your last visit? Include any pap smears or colon screening.  No     Health Maintenance Due   Topic Date Due    DTaP/Tdap/Td series (1 - Tdap) 11/08/2004    PAP AKA CERVICAL CYTOLOGY  11/08/2004

## 2019-02-11 ENCOUNTER — TELEPHONE (OUTPATIENT)
Dept: FAMILY MEDICINE CLINIC | Age: 34
End: 2019-02-11

## 2019-02-11 NOTE — TELEPHONE ENCOUNTER
Arianna Quintanilla with Radiology contacted the office and stated that the patient imaging (CT Lumbar) requires a Peer to Peer with [facility]. Phone number is [phone]. Tracking number is #[id number]. Please advise.  Thank you

## 2019-02-15 ENCOUNTER — TELEPHONE (OUTPATIENT)
Dept: FAMILY MEDICINE CLINIC | Age: 34
End: 2019-02-15

## 2019-02-15 NOTE — TELEPHONE ENCOUNTER
Please see message below, Salome Monaco with [facility] coordinator wanting to speak with you her phone number is listed below

## 2019-02-15 NOTE — TELEPHONE ENCOUNTER
Pt. Is requesting referrals to [facility] or [facility] Neurology and also to [facility].  Please assist.

## 2019-02-15 NOTE — TELEPHONE ENCOUNTER
Mauro Ga from [facility] called in and stated that she is the new care coordinator and she was wanting to run a few things by Dr. Elizabeth Finch when he had a chance to speak with her. Please advise.

## 2019-02-22 NOTE — TELEPHONE ENCOUNTER
LVM for patient to call office back to schedule an appointment with , next available regarding referrals. No further details given.

## 2019-03-13 ENCOUNTER — TELEPHONE (OUTPATIENT)
Dept: FAMILY MEDICINE CLINIC | Age: 34
End: 2019-03-13

## 2019-03-13 NOTE — TELEPHONE ENCOUNTER
Supply order with progress notes has been faxed to [address] [place] [address] Rd. @ [phone]. Fax confirmation was received and sent to central scanning.

## 2019-03-13 NOTE — TELEPHONE ENCOUNTER
Patient called stating she still has not received a nebulizer. She contacted [facility] at [phone]. They need prescription for nebulizer along with office notes.

## 2019-03-20 ENCOUNTER — OFFICE VISIT (OUTPATIENT)
Dept: FAMILY MEDICINE CLINIC | Age: 34
End: 2019-03-20

## 2019-03-20 VITALS
TEMPERATURE: 98.6 F | SYSTOLIC BLOOD PRESSURE: 129 MMHG | BODY MASS INDEX: 30.59 KG/M2 | RESPIRATION RATE: 16 BRPM | HEART RATE: 85 BPM | OXYGEN SATURATION: 96 % | HEIGHT: 62 IN | DIASTOLIC BLOOD PRESSURE: 67 MMHG | WEIGHT: 166.2 LBS

## 2019-03-20 DIAGNOSIS — F31.5 BIPOLAR DISORDER, CURRENT EPISODE DEPRESSED, SEVERE, WITH PSYCHOTIC FEATURES (HCC): Primary | ICD-10-CM

## 2019-03-20 DIAGNOSIS — K04.7 TOOTH ABSCESS: ICD-10-CM

## 2019-03-20 DIAGNOSIS — F41.9 ANXIETY: ICD-10-CM

## 2019-03-20 RX ORDER — DIAZEPAM 5 MG/1
5 TABLET ORAL
Qty: 5 TAB | Refills: 0 | Status: SHIPPED | OUTPATIENT
Start: 2019-03-20 | End: 2019-05-31

## 2019-03-20 RX ORDER — QUETIAPINE FUMARATE 300 MG/1
900 TABLET, FILM COATED ORAL
Qty: 90 TAB | Refills: 11 | Status: SHIPPED | OUTPATIENT
Start: 2019-03-20

## 2019-03-20 RX ORDER — CEFTRIAXONE 1 G/1
1 INJECTION, POWDER, FOR SOLUTION INTRAMUSCULAR; INTRAVENOUS ONCE
Qty: 1 VIAL | Refills: 0
Start: 2019-03-20 | End: 2019-03-20

## 2019-03-20 RX ORDER — HYDROCODONE BITARTRATE AND ACETAMINOPHEN 10; 325 MG/1; MG/1
1 TABLET ORAL
COMMUNITY
End: 2019-05-31

## 2019-03-20 RX ORDER — ERYTHROMYCIN 500 MG/1
TABLET, COATED ORAL DAILY
COMMUNITY
End: 2019-05-31

## 2019-03-20 NOTE — PROGRESS NOTES
Chaz Story is a 26 y.o.  female and presents with    Chief Complaint   Patient presents with    Dental Pain    Anxiety     Subjective:  Ms. Donaldo Berrios presents c/o increased anxiety and anger and fear and her medications are not effective. She is with her boyfriend who verifies her reports of anger and anxiety and paranoid behavior. She has been depressed over her anger. She has been taking quetiapine 900 mg at bedtime. She has used zyprexa and haloperidol in the past as needed for psychosis episodes. She has not established care with a psychiatrist.      Nely Pinedaks: positive for  - fatigue  negative for - chills or fever  Psychological ROS: positive for - anxiety  Ophthalmic ROS: positive for - blurry vision  ENT ROS: positive for - headaches  Endocrine ROS: negative for - temperature intolerance  Respiratory ROS: no cough, shortness of breath, or wheezing  Cardiovascular ROS: no chest pain or dyspnea on exertion  Gastrointestinal ROS: positive for - nausea; no ongoing bowel incontinence  Genito-Urinary ROS: no dysuria, trouble voiding, or hematuria  Dermatological ROS: no rashes or lesions    All other systems reviewed and are negative. Objective:  Vitals:    03/20/19 1607   BP: 129/67   Pulse: 85   Resp: 16   Temp: 98.6 °F (37 °C)   TempSrc: Oral   SpO2: 96%   Weight: 166 lb 3.2 oz (75.4 kg)   Height: 5' 2\" (1.575 m)   PainSc:   8   PainLoc: Face       alert, well appearing, and in no distress, oriented to person, place, and time, anxious and crying  Chest - clear to auscultation, no wheezes, rales or rhonchi, symmetric air entry  Heart - normal rate, regular rhythm, normal S1, S2, no murmurs, rubs, clicks or gallops    Assessment/Plan:    1. Anxiety  Return to previous dose mood stabilizer; encourage f/u with psychiatry  - QUEtiapine (SEROQUEL) 300 mg tablet; Take 3 Tabs by mouth nightly. Dispense: 90 Tab; Refill: 11  - diazePAM (VALIUM) 5 mg tablet;  Take 1 Tab by mouth every twelve (12) hours as needed for Anxiety. Max Daily Amount: 10 mg. Indications: anxious  Dispense: 5 Tab; Refill: 0    2. Bipolar disorder, current episode depressed, severe, with psychotic features ([facility]    - REFERRAL TO PSYCHIATRY  - QUEtiapine (SEROQUEL) 300 mg tablet; Take 3 Tabs by mouth nightly. Dispense: 90 Tab; Refill: 11    3. Tooth abscess  Continue abx therapy; f/u with dentist  - cefTRIAXone (ROCEPHIN) 1 gram injection; 1 g by IntraMUSCular route once for 1 dose. Dispense: 1 Vial; Refill: 0  - CEFTRIAXONE SODIUM INJECTION  MG  - AL THER/PROPH/DIAG INJECTION, SUBCUT/IM    Lab review: no lab studies available for review at time of visit      I have discussed the diagnosis with the patient and the intended plan as seen in the above orders. The patient has received an after-visit summary and questions were answered concerning future plans. I have discussed medication side effects and warnings with the patient as well. I have reviewed the plan of care with the patient, accepted their input and they are in agreement with the treatment goals.

## 2019-03-20 NOTE — PROGRESS NOTES
Chief Complaint   Patient presents with    Dental Pain    Anxiety     1. Have you been to the ER, urgent care clinic since your last visit? Hospitalized since your last visit? No    2. Have you seen or consulted any other health care providers outside of the [address] [place] [address] since your last visit? Include any pap smears or colon screening.  No

## 2019-03-22 ENCOUNTER — TELEPHONE (OUTPATIENT)
Dept: FAMILY MEDICINE CLINIC | Age: 34
End: 2019-03-22

## 2019-03-22 NOTE — TELEPHONE ENCOUNTER
Patient recently came in 3/20/19 and received physciatry referral. They however do not accept patients insurance. Patient found [facility] does accept her insurance but needs a referral/phone call from nurse/doctor to answer a few questions regarding the patient. Please advise, thank you.        Phone number: [phone]

## 2019-03-22 NOTE — TELEPHONE ENCOUNTER
Patient referred to [facility]. Patient referral, progress notes and demographics faxed. Fax confirmation received and sent to central scanning.

## 2019-04-26 ENCOUNTER — OFFICE VISIT (OUTPATIENT)
Dept: FAMILY MEDICINE CLINIC | Age: 34
End: 2019-04-26

## 2019-04-26 VITALS
DIASTOLIC BLOOD PRESSURE: 89 MMHG | TEMPERATURE: 98.8 F | WEIGHT: 172 LBS | SYSTOLIC BLOOD PRESSURE: 119 MMHG | HEIGHT: 62 IN | OXYGEN SATURATION: 96 % | RESPIRATION RATE: 16 BRPM | HEART RATE: 82 BPM | BODY MASS INDEX: 31.65 KG/M2

## 2019-04-26 DIAGNOSIS — G62.9 NEUROPATHY: ICD-10-CM

## 2019-04-26 DIAGNOSIS — F41.9 ANXIETY: ICD-10-CM

## 2019-04-26 DIAGNOSIS — K04.7 TOOTH ABSCESS: ICD-10-CM

## 2019-04-26 DIAGNOSIS — M54.50 LUMBAR PAIN: ICD-10-CM

## 2019-04-26 DIAGNOSIS — F31.5 BIPOLAR DISORDER, CURRENT EPISODE DEPRESSED, SEVERE, WITH PSYCHOTIC FEATURES (HCC): ICD-10-CM

## 2019-04-26 DIAGNOSIS — J98.11 ATELECTASIS: Primary | ICD-10-CM

## 2019-04-26 RX ORDER — NAPROXEN 500 MG/1
500 TABLET ORAL 2 TIMES DAILY WITH MEALS
Qty: 60 TAB | Refills: 11 | Status: SHIPPED | OUTPATIENT
Start: 2019-04-26 | End: 2019-12-18 | Stop reason: ALTCHOICE

## 2019-04-26 RX ORDER — GABAPENTIN 300 MG/1
600 CAPSULE ORAL 3 TIMES DAILY
Qty: 180 CAP | Refills: 11 | Status: SHIPPED | OUTPATIENT
Start: 2019-04-26 | End: 2019-08-08 | Stop reason: SDUPTHER

## 2019-04-26 NOTE — PROGRESS NOTES
Madelaine Moreno is a 26 y.o.  female and presents with Chief Complaint Patient presents with  Pre-op Exam  
 
Subjective: 
Ms. Kenn Hatch presents for pre op exam for tooth extraction; she was [facility] in [facility] 1 week ago for shortness of breath; she has h/o substance abuse and pneumonia; she has been sober for 2 months . She told the oral surgeon that she has a pneumothorax based on her ER evaluation; she has shortness of breath during sex. She has cough; she has pain with deep inspiration ROS General ROS: positive for  - fatigue 
negative for - chills or fever Psychological ROS: positive for - anxiety Ophthalmic ROS: positive for - blurry vision ENT ROS: positive for - headaches Endocrine ROS: negative for - temperature intolerance Gastrointestinal ROS: positive for - nausea; no ongoing bowel incontinence Genito-Urinary ROS: no dysuria, trouble voiding, or hematuria Dermatological ROS: no rashes or lesions All other systems reviewed and are negative. Objective: 
Vitals:  
 04/26/19 0857 BP: 119/89 Pulse: 82 Resp: 16 Temp: 98.8 °F (37.1 °C) TempSrc: Oral  
SpO2: 96% Weight: 172 lb (78 kg) Height: 5' 2\" (1.575 m) PainSc:  10 - Worst pain ever PainLoc: Rib Cage  
 
 
 
alert, well appearing, and in no distress, oriented to person, place, and time, anxious and crying Chest - clear to auscultation, no wheezes, rales or rhonchi, symmetric air entry Heart - normal rate, regular rhythm, normal S1, S2, no murmurs, rubs, clicks or gallops Mental status - anxious TESTS Chest xray - atelectasis Assessment/Plan: 1. Atelectasis Inspiratory exercises demonstrated; pulmonary toilet 2. Tooth abscess F/u with oral surgeon; cleared for procedure 3. Bipolar disorder, current episode depressed, severe, with psychotic features ([facility] [address] Psych appt scheduled 4. Anxiety Medications effective; pet therapy 5. Lumbar pain 
nsaid and stretching - naproxen (NAPROSYN) 500 mg tablet; Take 1 Tab by mouth two (2) times daily (with meals). Dispense: 60 Tab; Refill: 11 
 
6. Neuropathy Increase dose for better control of pain 
- gabapentin (NEURONTIN) 300 mg capsule; Take 2 Caps by mouth three (3) times daily. Dispense: 180 Cap; Refill: 11 Lab review: labs reviewed, I note that comprehensive metabolic panel normal 
 
 
I have discussed the diagnosis with the patient and the intended plan as seen in the above orders. The patient has received an after-visit summary and questions were answered concerning future plans. I have discussed medication side effects and warnings with the patient as well. I have reviewed the plan of care with the patient, accepted their input and they are in agreement with the treatment goals.

## 2019-04-26 NOTE — PROGRESS NOTES
Chief Complaint Patient presents with  Pre-op Exam  
 
1. Have you been to the ER, urgent care clinic since your last visit? Hospitalized since your last visit? Yes When: 3/22/19,4/7/19,4/13/19,4/21/19,4/23/19 
 
2. Have you seen or consulted any other health care providers outside of the [address] [place] [address] since your last visit? Include any pap smears or colon screening.  No

## 2019-05-09 ENCOUNTER — OFFICE VISIT (OUTPATIENT)
Dept: FAMILY MEDICINE CLINIC | Age: 34
End: 2019-05-09

## 2019-05-09 VITALS
WEIGHT: 167.2 LBS | BODY MASS INDEX: 30.77 KG/M2 | OXYGEN SATURATION: 100 % | HEIGHT: 62 IN | SYSTOLIC BLOOD PRESSURE: 124 MMHG | RESPIRATION RATE: 17 BRPM | DIASTOLIC BLOOD PRESSURE: 79 MMHG | HEART RATE: 92 BPM | TEMPERATURE: 99.2 F

## 2019-05-09 DIAGNOSIS — K52.9 COLITIS: Primary | ICD-10-CM

## 2019-05-09 DIAGNOSIS — M79.18 MYOFASCIAL PAIN ON LEFT SIDE: ICD-10-CM

## 2019-05-09 DIAGNOSIS — R11.0 NAUSEA: ICD-10-CM

## 2019-05-09 RX ORDER — TRIAMCINOLONE ACETONIDE 40 MG/ML
40 INJECTION, SUSPENSION INTRA-ARTICULAR; INTRAMUSCULAR ONCE
Qty: 1 ML | Refills: 0
Start: 2019-05-09 | End: 2019-05-09

## 2019-05-09 RX ORDER — ONDANSETRON 4 MG/1
TABLET, ORALLY DISINTEGRATING ORAL
Qty: 30 TAB | Refills: 0 | Status: SHIPPED | OUTPATIENT
Start: 2019-05-09 | End: 2019-05-21 | Stop reason: DRUGHIGH
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
## 2019-05-09 NOTE — PROGRESS NOTES
Sharon Barrera is a 26 y.o female that is present in the office for a routine appointment for back pain. (possible pinched nerve) 1. Have you been to the ER, urgent care clinic since your last visit? Hospitalized since your last visit? No 
 
2. Have you seen or consulted any other health care providers outside of the [address] [place] [address] since your last visit? Include any pap smears or colon screening. No 
 
Health Maintenance Due Topic Date Due  
 DTaP/Tdap/Td series (1 - Tdap) 11/08/2004  PAP AKA CERVICAL CYTOLOGY  11/08/2004

## 2019-05-13 DIAGNOSIS — J41.1 MUCOPURULENT CHRONIC BRONCHITIS (HCC): ICD-10-CM

## 2019-05-14 RX ORDER — ALBUTEROL SULFATE 90 UG/1
AEROSOL, METERED RESPIRATORY (INHALATION)
Qty: 2 INHALER | Refills: 1 | Status: SHIPPED | OUTPATIENT
Start: 2019-05-14 | End: 2019-11-06 | Stop reason: SDUPTHER

## 2019-05-14 RX ORDER — BUDESONIDE AND FORMOTEROL FUMARATE DIHYDRATE 160; 4.5 UG/1; UG/1
AEROSOL RESPIRATORY (INHALATION)
Qty: 3 INHALER | Refills: 3 | Status: SHIPPED | OUTPATIENT
Start: 2019-05-14 | End: 2019-05-31

## 2019-05-21 DIAGNOSIS — R11.0 NAUSEA: ICD-10-CM

## 2019-05-21 RX ORDER — ONDANSETRON HYDROCHLORIDE 8 MG/1
TABLET, FILM COATED ORAL
Qty: 30 TAB | Refills: 0 | Status: SHIPPED | OUTPATIENT
Start: 2019-05-21 | End: 2019-11-20 | Stop reason: DRUGHIGH

## 2019-05-28 ENCOUNTER — TELEPHONE (OUTPATIENT)
Dept: FAMILY MEDICINE CLINIC | Age: 34
End: 2019-05-28

## 2019-05-28 NOTE — TELEPHONE ENCOUNTER
Pt called in wanting to know if she could get a call back from the nurse or doctor as soon as possible. Please advise.

## 2019-05-28 NOTE — TELEPHONE ENCOUNTER
Called patient back. Patient states she needs a pre op clearance done before her scheduled surgery June 5th for teeth extraction. Patient offered appointment 5/31/19 at 12:45 pm. Patient accepted.  Awaiting arrival.

## 2019-05-30 ENCOUNTER — TELEPHONE (OUTPATIENT)
Dept: FAMILY MEDICINE CLINIC | Age: 34
End: 2019-05-30

## 2019-05-30 NOTE — TELEPHONE ENCOUNTER
Gely from Dr. Kelly Meléndez office called in and was requesting that the patient's most recent [address], CBC, CMP, Chest Xray, and EKG be sent to her at [phone]. Please advise.

## 2019-05-31 ENCOUNTER — OFFICE VISIT (OUTPATIENT)
Dept: FAMILY MEDICINE CLINIC | Age: 34
End: 2019-05-31

## 2019-05-31 ENCOUNTER — HOSPITAL ENCOUNTER (OUTPATIENT)
Dept: OTHER | Age: 34
Discharge: HOME | End: 2019-05-31
Payer: MEDICAID

## 2019-05-31 ENCOUNTER — HOSPITAL ENCOUNTER (OUTPATIENT)
Dept: PREADMISSION TESTING | Age: 34
Discharge: HOME | End: 2019-05-31
Payer: MEDICAID

## 2019-05-31 VITALS
WEIGHT: 173.6 LBS | HEIGHT: 62 IN | BODY MASS INDEX: 31.94 KG/M2 | DIASTOLIC BLOOD PRESSURE: 69 MMHG | HEART RATE: 83 BPM | OXYGEN SATURATION: 97 % | RESPIRATION RATE: 17 BRPM | SYSTOLIC BLOOD PRESSURE: 118 MMHG | TEMPERATURE: 99.3 F

## 2019-05-31 DIAGNOSIS — Z01.818 PRE-OP EXAM: ICD-10-CM

## 2019-05-31 DIAGNOSIS — Z01.818 PRE-OP EXAM: Primary | ICD-10-CM

## 2019-05-31 DIAGNOSIS — K04.7 TOOTH ABSCESS: ICD-10-CM

## 2019-05-31 LAB
ALBUMIN SERPL-MCNC: 4.5 G/DL (ref 3.4–5)
ALBUMIN/GLOB SERPL: 1.5 {RATIO} (ref 0.8–1.7)
ALP SERPL-CCNC: 97 U/L (ref 45–117)
ALT SERPL-CCNC: 20 U/L (ref 13–56)
ANION GAP SERPL CALC-SCNC: 9 MMOL/L (ref 3–18)
AST SERPL-CCNC: 13 U/L (ref 15–37)
BASOPHILS # BLD: 0.1 K/UL (ref 0–0.1)
BASOPHILS NFR BLD: 0 % (ref 0–2)
BILIRUB SERPL-MCNC: 0.4 MG/DL (ref 0.2–1)
BUN SERPL-MCNC: 8 MG/DL (ref 7–18)
BUN/CREAT SERPL: 10 (ref 12–20)
CALCIUM SERPL-MCNC: 9.2 MG/DL (ref 8.5–10.1)
CHLORIDE SERPL-SCNC: 104 MMOL/L (ref 100–108)
CO2 SERPL-SCNC: 29 MMOL/L (ref 21–32)
CREAT SERPL-MCNC: 0.83 MG/DL (ref 0.6–1.3)
DIFFERENTIAL METHOD BLD: ABNORMAL
EOSINOPHIL # BLD: 0.3 K/UL (ref 0–0.4)
EOSINOPHIL NFR BLD: 2 % (ref 0–5)
ERYTHROCYTE [DISTWIDTH] IN BLOOD BY AUTOMATED COUNT: 13.9 % (ref 11.6–14.5)
GLOBULIN SER CALC-MCNC: 3.1 G/DL (ref 2–4)
GLUCOSE SERPL-MCNC: 88 MG/DL (ref 74–99)
HCT VFR BLD AUTO: 42.6 % (ref 35–45)
HGB BLD-MCNC: 14.5 G/DL (ref 12–16)
LYMPHOCYTES # BLD: 3.4 K/UL (ref 0.9–3.6)
LYMPHOCYTES NFR BLD: 27 % (ref 21–52)
MCH RBC QN AUTO: 33.9 PG (ref 24–34)
MCHC RBC AUTO-ENTMCNC: 34 G/DL (ref 31–37)
MCV RBC AUTO: 99.5 FL (ref 74–97)
MONOCYTES # BLD: 1 K/UL (ref 0.05–1.2)
MONOCYTES NFR BLD: 8 % (ref 3–10)
NEUTS SEG # BLD: 8 K/UL (ref 1.8–8)
NEUTS SEG NFR BLD: 63 % (ref 40–73)
PLATELET # BLD AUTO: 301 K/UL (ref 135–420)
PMV BLD AUTO: 9.3 FL (ref 9.2–11.8)
POTASSIUM SERPL-SCNC: 4.6 MMOL/L (ref 3.5–5.5)
PROT SERPL-MCNC: 7.6 G/DL (ref 6.4–8.2)
RBC # BLD AUTO: 4.28 M/UL (ref 4.2–5.3)
SODIUM SERPL-SCNC: 142 MMOL/L (ref 136–145)
WBC # BLD AUTO: 12.7 K/UL (ref 4.6–13.2)

## 2019-05-31 PROCEDURE — 36415 COLL VENOUS BLD VENIPUNCTURE: CPT

## 2019-05-31 PROCEDURE — 80053 COMPREHEN METABOLIC PANEL: CPT

## 2019-05-31 PROCEDURE — 85025 COMPLETE CBC W/AUTO DIFF WBC: CPT

## 2019-05-31 PROCEDURE — 71046 X-RAY EXAM CHEST 2 VIEWS: CPT

## 2019-05-31 RX ORDER — CEFTRIAXONE 1 G/1
1 INJECTION, POWDER, FOR SOLUTION INTRAMUSCULAR; INTRAVENOUS ONCE
Qty: 1 VIAL | Refills: 0
Start: 2019-05-31 | End: 2019-05-31

## 2019-05-31 NOTE — PROGRESS NOTES
Tavares Marrero is a 26 y.o.  female and presents with    Chief Complaint   Patient presents with    Pre-op Exam     Subjective:  Ms. Kiki Crain presents for pre op exam prior to tooth extraction which is scheduled for 6 days from now. She has tooth pain and green discharge from gum; she has had recurrent abscesses. ROS   General ROS: negative for - chills, fatigue or fever  Ophthalmic ROS: positive for - uses glasses  ENT ROS: positive for - headaches, negative for - nasal discharge, sinus pain or sore throat  Endocrine ROS: negative for - temperature intolerance  Respiratory ROS: no cough, shortness of breath, or wheezing  Cardiovascular ROS: no chest pain or dyspnea on exertion  Gastrointestinal ROS: positive for - nausea; no ongoing bowel incontinence  Genito-Urinary ROS: no dysuria, trouble voiding, or hematuria  Dermatological ROS: no rashes or lesions    All other systems reviewed and are negative. Objective:  Vitals:    05/31/19 1255   BP: 118/69   Pulse: 83   Resp: 17   Temp: 99.3 °F (37.4 °C)   TempSrc: Oral   SpO2: 97%   Weight: 173 lb 9.6 oz (78.7 kg)   Height: 5' 2\" (1.575 m)   PainSc:   0 - No pain     Visit Vitals  /69 (BP 1 Location: Right arm, BP Patient Position: Sitting)   Pulse 83   Temp 99.3 °F (37.4 °C) (Oral)   Resp 17   Ht 5' 2\" (1.575 m)   Wt 173 lb 9.6 oz (78.7 kg)   SpO2 97%   BMI 31.75 kg/m²       General appearance  alert, cooperative, no distress, appears stated age   Head  Normocephalic, without obvious abnormality, atraumatic   Eyes  conjunctivae/corneas clear. PERRL, EOM's intact   Ears  normal TM's and external ear canals AU   Nose Nares normal. Septum midline. No drainage or sinus tenderness. Throat Lips, mucosa, and tongue normal. Poor dentition; dental abscess   Neck supple, symmetrical, trachea midline, no adenopathy, thyroid: not enlarged, symmetric, no tenderness/mass/nodules   Back   Pain with motion; symmetric, no curvature.  ROM normal   Lungs   clear to auscultation bilaterally   Breasts  Not examined   Heart  regular rate and rhythm, S1, S2 normal, no murmur, click, rub or gallop   Abdomen   soft, non-tender. Bowel sounds normal. No masses,  No organomegaly   Pelvic Deferred   Extremities extremities normal, atraumatic, no cyanosis or edema   Pulses 2+ and symmetric   Skin Skin color, texture, turgor normal. No rashes or lesions   Lymph nodes Cervical, supraclavicular, and axillary nodes normal.   Neurologic Normal     LABS   CBC and CMP   TESTS  EKG - normal sinus rhythm  Chest Xray - no acute cardiopulmonary process    Assessment/Plan:    1. Pre-op exam  Cleared to proceed  - CBC WITH AUTOMATED DIFF; Future  - METABOLIC PANEL, COMPREHENSIVE; Future  - AMB POC EKG ROUTINE W/ 12 LEADS, INTER & REP  - XR CHEST PA LAT; Future    2. Tooth abscess    - CEFTRIAXONE SODIUM INJECTION  MG  - GA THER/PROPH/DIAG INJECTION, SUBCUT/IM      Lab review: labs reviewed, I note that hemogram normal, comprehensive metabolic panel normal      I have discussed the diagnosis with the patient and the intended plan as seen in the above orders. The patient has received an after-visit summary and questions were answered concerning future plans. I have discussed medication side effects and warnings with the patient as well. I have reviewed the plan of care with the patient, accepted their input and they are in agreement with the treatment goals.

## 2019-05-31 NOTE — PROGRESS NOTES
Ulsises Orourke is a 26 y.o female that is present in the office for a routine appointment for pre-op clearance. Patient complains of \"oozing\" inside mouth. 1. Have you been to the ER, urgent care clinic since your last visit? Hospitalized since your last visit? No    2. Have you seen or consulted any other health care providers outside of the [address] [place] [address] since your last visit? Include any pap smears or colon screening.  No    Health Maintenance Due   Topic Date Due    DTaP/Tdap/Td series (1 - Tdap) 11/08/2004    PAP AKA CERVICAL CYTOLOGY  11/08/2004

## 2019-05-31 NOTE — TELEPHONE ENCOUNTER
Patient arrived in office today for her pre-op clearance and labs and chest xray were ordered. EKG was completed during today's appointment. Once labs are resulted, will fax to Dr. Abiodun Bermeo office along with progress notes.

## 2019-05-31 NOTE — PERIOP NOTES
PAT - SURGICAL PRE-ADMISSION INSTRUCTIONS    NAME:  Tavares Marrero                                                          TODAY'S DATE:  5/31/2019    SURGERY DATE:  6/5/2019                                  SURGERY ARRIVAL TIME:   1030    1. Do NOT eat or drink anything, including candy or gum, after MIDNIGHT on 06/04 , unless you have specific instructions from your Surgeon or Anesthesia Provider to do so. 2. No smoking on the day of surgery. 3. No alcohol 24 hours prior to the day of surgery. 4. No recreational drugs for one week prior to the day of surgery. 5. Leave all valuables, including money/purse, at home. 6. Remove all jewelry, nail polish, makeup (including mascara); no lotions, powders, deodorant, or perfume/cologne/after shave. 7. Glasses/Contact lenses and Dentures may be worn to the hospital.  They will be removed prior to surgery. 8. Call your doctor if symptoms of a cold or illness develop within 24 ours prior to surgery. 9. AN ADULT MUST DRIVE YOU HOME AFTER OUTPATIENT SURGERY. 10. If you are having an OUTPATIENT procedure, please make arrangements for a responsible adult to be with you for 24 hours after your surgery. 11. If you are admitted to the hospital, you will be assigned to a bed after surgery is complete. Normally a family member will not be able to see you until you are in your assigned bed. 15. Family is encouraged to accompany you to the hospital.  We ask visitors in the treatment area to be limited to ONE person at a time to ensure patient privacy. EXCEPTIONS WILL BE MADE AS NEEDED. 15. Children under 12 are discouraged from entering the treatment area and need to be supervised by an adult when in the waiting room. Special Instructions: Take these medications the morning of surgery with a sip of water:  Buspar ,Atenol and Gabapentin    Patient Prep:    Anti bacterial shower    These surgical instructions were reviewed with Levar Taylor during the PAT visit.   A printed copy of the instructions was provided to Alesia Ceballos. Directions: On the morning of surgery, please go to the [facility]. Enter the building from the [facility] entrance, 1st floor (next to the Emergency Room entrance). Take the elevator to the 2nd floor. Sign in at the Registration Desk.     If you have any questions and/or concerns, please do not hesitate to call:  (Prior to the day of surgery)  [facility] unit:  [phone]  (Day of surgery)  [facility] unit:  [phone]

## 2019-06-03 ENCOUNTER — TELEPHONE (OUTPATIENT)
Dept: FAMILY MEDICINE CLINIC | Age: 34
End: 2019-06-03

## 2019-06-03 NOTE — TELEPHONE ENCOUNTER
Keshia Louise with Dr. Cara Davis office contacted the office and advised that the H&P along with imaging and labs were needed for patient pre-op clearance. Advised that the H&P needs to be completed and once received, will send to office @ [phone]) ATTN: Trini Nguyen. Please advise. Thank you.

## 2019-06-04 ENCOUNTER — ANESTHESIA EVENT (OUTPATIENT)
Dept: SURGERY | Age: 34
End: 2019-06-04
Payer: MEDICAID

## 2019-06-04 NOTE — TELEPHONE ENCOUNTER
Tosin Morton from Dr. Concepcion Simeon office is checking the status of paperwork. She provided an alternate fax [phone]. She stated Meena Gardner will be cancelling appt by noon, if paperwork is not sent by then. Please advise.

## 2019-06-04 NOTE — TELEPHONE ENCOUNTER
Pre-op clearance has been faxed to Dr. Carmen Orourke office @ [phone] and confirmation received was sent to central scanning.

## 2019-06-05 ENCOUNTER — ANESTHESIA (OUTPATIENT)
Dept: SURGERY | Age: 34
End: 2019-06-05
Payer: MEDICAID

## 2019-06-05 ENCOUNTER — HOSPITAL ENCOUNTER (OUTPATIENT)
Age: 34
Setting detail: OUTPATIENT SURGERY
Discharge: HOME | End: 2019-06-05
Attending: DENTIST | Admitting: DENTIST
Payer: MEDICAID

## 2019-06-05 VITALS
HEIGHT: 62 IN | WEIGHT: 173 LBS | RESPIRATION RATE: 18 BRPM | OXYGEN SATURATION: 93 % | DIASTOLIC BLOOD PRESSURE: 77 MMHG | BODY MASS INDEX: 31.83 KG/M2 | HEART RATE: 69 BPM | TEMPERATURE: 97.4 F | SYSTOLIC BLOOD PRESSURE: 111 MMHG

## 2019-06-05 DIAGNOSIS — K02.9 DENTAL CARIES: Primary | ICD-10-CM

## 2019-06-05 LAB
AMPHET UR QL SCN: NEGATIVE
BARBITURATES UR QL SCN: NEGATIVE
BENZODIAZ UR QL: POSITIVE
CANNABINOIDS UR QL SCN: POSITIVE
COCAINE UR QL SCN: NEGATIVE
HCG UR QL: NEGATIVE
HDSCOM,HDSCOM: ABNORMAL
METHADONE UR QL: NEGATIVE
OPIATES UR QL: NEGATIVE
PCP UR QL: NEGATIVE

## 2019-06-05 PROCEDURE — 74011250636 HC RX REV CODE- 250/636: Performed by: NURSE ANESTHETIST, CERTIFIED REGISTERED

## 2019-06-05 PROCEDURE — 77030014006 HC SPNG HEMSTAT J&J -A: Performed by: DENTIST

## 2019-06-05 PROCEDURE — 74011250636 HC RX REV CODE- 250/636: Performed by: DENTIST

## 2019-06-05 PROCEDURE — 74011000250 HC RX REV CODE- 250

## 2019-06-05 PROCEDURE — 77030018836 HC SOL IRR NACL ICUM -A: Performed by: DENTIST

## 2019-06-05 PROCEDURE — 81025 URINE PREGNANCY TEST: CPT

## 2019-06-05 PROCEDURE — 77030032490 HC SLV COMPR SCD KNE COVD -B: Performed by: DENTIST

## 2019-06-05 PROCEDURE — 77030004377 HC BUR EGG STRY -B: Performed by: DENTIST

## 2019-06-05 PROCEDURE — 74011000250 HC RX REV CODE- 250: Performed by: DENTIST

## 2019-06-05 PROCEDURE — 77030028681 HC DRSG NSL ABSRB NASOPORE STRY -C: Performed by: DENTIST

## 2019-06-05 PROCEDURE — 77030002888 HC SUT CHRMC J&J -A: Performed by: DENTIST

## 2019-06-05 PROCEDURE — 80307 DRUG TEST PRSMV CHEM ANLYZR: CPT

## 2019-06-05 PROCEDURE — 76060000034 HC ANESTHESIA 1.5 TO 2 HR: Performed by: DENTIST

## 2019-06-05 PROCEDURE — 76210000006 HC OR PH I REC 0.5 TO 1 HR: Performed by: DENTIST

## 2019-06-05 PROCEDURE — 76210000021 HC REC RM PH II 0.5 TO 1 HR: Performed by: DENTIST

## 2019-06-05 PROCEDURE — 74011250636 HC RX REV CODE- 250/636

## 2019-06-05 PROCEDURE — 74011250637 HC RX REV CODE- 250/637: Performed by: NURSE ANESTHETIST, CERTIFIED REGISTERED

## 2019-06-05 PROCEDURE — 76010000162 HC OR TIME 1.5 TO 2 HR INTENSV-TIER 1: Performed by: DENTIST

## 2019-06-05 RX ORDER — FENTANYL CITRATE 50 UG/ML
50 INJECTION, SOLUTION INTRAMUSCULAR; INTRAVENOUS AS NEEDED
Status: DISCONTINUED | OUTPATIENT
Start: 2019-06-05 | End: 2019-06-05 | Stop reason: HOSPADM

## 2019-06-05 RX ORDER — SODIUM CHLORIDE 0.9 % (FLUSH) 0.9 %
5-40 SYRINGE (ML) INJECTION AS NEEDED
Status: DISCONTINUED | OUTPATIENT
Start: 2019-06-05 | End: 2019-06-05 | Stop reason: HOSPADM

## 2019-06-05 RX ORDER — FENTANYL CITRATE 50 UG/ML
INJECTION, SOLUTION INTRAMUSCULAR; INTRAVENOUS AS NEEDED
Status: DISCONTINUED | OUTPATIENT
Start: 2019-06-05 | End: 2019-06-05 | Stop reason: HOSPADM

## 2019-06-05 RX ORDER — MIDAZOLAM HYDROCHLORIDE 1 MG/ML
INJECTION, SOLUTION INTRAMUSCULAR; INTRAVENOUS AS NEEDED
Status: DISCONTINUED | OUTPATIENT
Start: 2019-06-05 | End: 2019-06-05 | Stop reason: HOSPADM

## 2019-06-05 RX ORDER — SODIUM CHLORIDE, SODIUM LACTATE, POTASSIUM CHLORIDE, CALCIUM CHLORIDE 600; 310; 30; 20 MG/100ML; MG/100ML; MG/100ML; MG/100ML
25 INJECTION, SOLUTION INTRAVENOUS CONTINUOUS
Status: DISCONTINUED | OUTPATIENT
Start: 2019-06-05 | End: 2019-06-05 | Stop reason: HOSPADM

## 2019-06-05 RX ORDER — ONDANSETRON 2 MG/ML
4 INJECTION INTRAMUSCULAR; INTRAVENOUS ONCE
Status: DISCONTINUED | OUTPATIENT
Start: 2019-06-05 | End: 2019-06-05 | Stop reason: HOSPADM

## 2019-06-05 RX ORDER — HYDROMORPHONE HYDROCHLORIDE 2 MG/ML
0.5 INJECTION, SOLUTION INTRAMUSCULAR; INTRAVENOUS; SUBCUTANEOUS
Status: DISCONTINUED | OUTPATIENT
Start: 2019-06-05 | End: 2019-06-05 | Stop reason: HOSPADM

## 2019-06-05 RX ORDER — SODIUM CHLORIDE 0.9 % (FLUSH) 0.9 %
5-40 SYRINGE (ML) INJECTION EVERY 8 HOURS
Status: DISCONTINUED | OUTPATIENT
Start: 2019-06-05 | End: 2019-06-05 | Stop reason: HOSPADM

## 2019-06-05 RX ORDER — PROPOFOL 10 MG/ML
INJECTION, EMULSION INTRAVENOUS AS NEEDED
Status: DISCONTINUED | OUTPATIENT
Start: 2019-06-05 | End: 2019-06-05 | Stop reason: HOSPADM

## 2019-06-05 RX ORDER — CLINDAMYCIN PHOSPHATE 900 MG/50ML
900 INJECTION INTRAVENOUS ONCE
Status: COMPLETED | OUTPATIENT
Start: 2019-06-05 | End: 2019-06-05

## 2019-06-05 RX ORDER — IBUPROFEN 200 MG
800 TABLET ORAL
Qty: 20 TAB | Refills: 0 | Status: SHIPPED | OUTPATIENT
Start: 2019-06-05 | End: 2019-11-25 | Stop reason: SDUPTHER

## 2019-06-05 RX ORDER — DEXAMETHASONE SODIUM PHOSPHATE 4 MG/ML
INJECTION, SOLUTION INTRA-ARTICULAR; INTRALESIONAL; INTRAMUSCULAR; INTRAVENOUS; SOFT TISSUE AS NEEDED
Status: DISCONTINUED | OUTPATIENT
Start: 2019-06-05 | End: 2019-06-05 | Stop reason: HOSPADM

## 2019-06-05 RX ORDER — OXYCODONE AND ACETAMINOPHEN 10; 325 MG/1; MG/1
1 TABLET ORAL
Status: COMPLETED | OUTPATIENT
Start: 2019-06-05 | End: 2019-06-05

## 2019-06-05 RX ORDER — ONDANSETRON 2 MG/ML
INJECTION INTRAMUSCULAR; INTRAVENOUS AS NEEDED
Status: DISCONTINUED | OUTPATIENT
Start: 2019-06-05 | End: 2019-06-05 | Stop reason: HOSPADM

## 2019-06-05 RX ORDER — OXYCODONE AND ACETAMINOPHEN 7.5; 325 MG/1; MG/1
1 TABLET ORAL
Qty: 24 TAB | Refills: 0 | Status: SHIPPED | OUTPATIENT
Start: 2019-06-05 | End: 2019-06-09

## 2019-06-05 RX ORDER — CLINDAMYCIN HYDROCHLORIDE 150 MG/1
300 CAPSULE ORAL 4 TIMES DAILY
Qty: 20 CAP | Refills: 0 | Status: SHIPPED | OUTPATIENT
Start: 2019-06-05 | End: 2019-07-29

## 2019-06-05 RX ORDER — CHLORHEXIDINE GLUCONATE 1.2 MG/ML
RINSE ORAL AS NEEDED
Status: DISCONTINUED | OUTPATIENT
Start: 2019-06-05 | End: 2019-06-05 | Stop reason: HOSPADM

## 2019-06-05 RX ORDER — BUPIVACAINE HYDROCHLORIDE AND EPINEPHRINE 5; 5 MG/ML; UG/ML
INJECTION, SOLUTION EPIDURAL; INTRACAUDAL; PERINEURAL AS NEEDED
Status: DISCONTINUED | OUTPATIENT
Start: 2019-06-05 | End: 2019-06-05 | Stop reason: HOSPADM

## 2019-06-05 RX ORDER — FAMOTIDINE 20 MG/1
20 TABLET, FILM COATED ORAL ONCE
Status: COMPLETED | OUTPATIENT
Start: 2019-06-05 | End: 2019-06-05

## 2019-06-05 RX ORDER — SUCCINYLCHOLINE CHLORIDE 20 MG/ML
INJECTION INTRAMUSCULAR; INTRAVENOUS AS NEEDED
Status: DISCONTINUED | OUTPATIENT
Start: 2019-06-05 | End: 2019-06-05 | Stop reason: HOSPADM

## 2019-06-05 RX ORDER — VECURONIUM BROMIDE FOR INJECTION 1 MG/ML
INJECTION, POWDER, LYOPHILIZED, FOR SOLUTION INTRAVENOUS AS NEEDED
Status: DISCONTINUED | OUTPATIENT
Start: 2019-06-05 | End: 2019-06-05 | Stop reason: HOSPADM

## 2019-06-05 RX ORDER — LIDOCAINE HYDROCHLORIDE 20 MG/ML
INJECTION, SOLUTION EPIDURAL; INFILTRATION; INTRACAUDAL; PERINEURAL AS NEEDED
Status: DISCONTINUED | OUTPATIENT
Start: 2019-06-05 | End: 2019-06-05 | Stop reason: HOSPADM

## 2019-06-05 RX ORDER — MAGNESIUM SULFATE 100 %
4 CRYSTALS MISCELLANEOUS AS NEEDED
Status: DISCONTINUED | OUTPATIENT
Start: 2019-06-05 | End: 2019-06-05 | Stop reason: HOSPADM

## 2019-06-05 RX ORDER — CHLORHEXIDINE GLUCONATE 1.2 MG/ML
15 RINSE ORAL 4 TIMES DAILY
Qty: 960 ML | Refills: 0 | Status: SHIPPED | OUTPATIENT
Start: 2019-06-05 | End: 2019-06-15

## 2019-06-05 RX ADMIN — VECURONIUM BROMIDE FOR INJECTION 1 MG: 1 INJECTION, POWDER, LYOPHILIZED, FOR SOLUTION INTRAVENOUS at 13:29

## 2019-06-05 RX ADMIN — PROPOFOL 150 MG: 10 INJECTION, EMULSION INTRAVENOUS at 13:29

## 2019-06-05 RX ADMIN — SUCCINYLCHOLINE CHLORIDE 100 MG: 20 INJECTION INTRAMUSCULAR; INTRAVENOUS at 13:30

## 2019-06-05 RX ADMIN — PROPOFOL 50 MG: 10 INJECTION, EMULSION INTRAVENOUS at 13:30

## 2019-06-05 RX ADMIN — OXYCODONE AND ACETAMINOPHEN 1 TABLET: 10; 325 TABLET ORAL at 16:32

## 2019-06-05 RX ADMIN — SODIUM CHLORIDE, SODIUM LACTATE, POTASSIUM CHLORIDE, AND CALCIUM CHLORIDE 25 ML/HR: 600; 310; 30; 20 INJECTION, SOLUTION INTRAVENOUS at 12:32

## 2019-06-05 RX ADMIN — LIDOCAINE HYDROCHLORIDE 80 MG: 20 INJECTION, SOLUTION EPIDURAL; INFILTRATION; INTRACAUDAL; PERINEURAL at 13:29

## 2019-06-05 RX ADMIN — FENTANYL CITRATE 100 MCG: 50 INJECTION, SOLUTION INTRAMUSCULAR; INTRAVENOUS at 13:29

## 2019-06-05 RX ADMIN — FAMOTIDINE 20 MG: 20 TABLET, FILM COATED ORAL at 12:37

## 2019-06-05 RX ADMIN — DEXAMETHASONE SODIUM PHOSPHATE 8 MG: 4 INJECTION, SOLUTION INTRA-ARTICULAR; INTRALESIONAL; INTRAMUSCULAR; INTRAVENOUS; SOFT TISSUE at 13:30

## 2019-06-05 RX ADMIN — ONDANSETRON 4 MG: 2 INJECTION INTRAMUSCULAR; INTRAVENOUS at 13:30

## 2019-06-05 RX ADMIN — CLINDAMYCIN PHOSPHATE 900 MG: 900 INJECTION INTRAVENOUS at 13:24

## 2019-06-05 RX ADMIN — MIDAZOLAM HYDROCHLORIDE 2 MG: 1 INJECTION, SOLUTION INTRAMUSCULAR; INTRAVENOUS at 13:22

## 2019-06-05 NOTE — PERIOP NOTES
1150 Assumed care    Urine sent to lab for DS. Communicated to Dr. Manuelito Rea.  Pt's friend is Joaquim Ochoa [phone]  May be in his car.

## 2019-06-05 NOTE — H&P
Date of Surgery Update:  Meghana Morfin was seen and examined. History and physical has been reviewed. The patient has been examined.  There have been no significant clinical changes since the completion of the originally dated History and Physical.    Signed By: Mirlande Walters DDS     June 5, 2019 12:39 PM

## 2019-06-05 NOTE — PERIOP NOTES
Phase 2 Recovery Summary  Patient arrived to Phase 2 at 1600  Report received from Ailyn Vail Dr:    06/05/19 1515 06/05/19 1520 06/05/19 1535 06/05/19 1600   BP: 120/78 123/82 117/77 111/77   Pulse: 72 73 72 69   Resp: 28 16 25 18   Temp:   97.4 °F (36.3 °C)    SpO2: 92% 99% 92% 93%   Weight:       Height:           oriented to time, place, person and situation    Lines and Drains  Peripheral Intravenous Line:      Wound  Wound Mouth (Active)   Dressing Status Breakthrough drainage 6/5/2019  4:02 PM   Dressing Type Packing 6/5/2019  4:02 PM   Incision Site Well Approximated Yes 6/5/2019  4:02 PM   Drainage Amount Small 6/5/2019  4:02 PM   Drainage Color Sanguinous 6/5/2019  4:02 PM   Number of days: 0      Pt presents to phase 2 from PACU. Pt c/o of /10 pain.  notified, percocet ordered. VSS. Pt ambulated from bed to chair with minimal assistance. Pt tolerated PO pill and small amount IV removed. Pt allowed to get dressed. Provided pt with gauze. Discharge instructions reviewed with pt and boyfriend. Boyfriend signed for discharge. Pt brought down via wheelchair.        Patient discharged to home with boyfriend  at [address] [place] [address]

## 2019-06-05 NOTE — PERIOP NOTES
1500: Received PT from OR via stretcher, nasal airway in place, O2 mask in place at 10L, gauze in mouth jesus. Pt in no acute distress at this time    1545: Report given to phase 2 recovery nurse, Penny Atkinson RN. Packing Dry in pt's mouth jesus. Ice pack to both sides of face. Pt in no acute distress.

## 2019-06-05 NOTE — DISCHARGE INSTRUCTIONS
Geraldine Aschoff    Appointment:  Please call [phone] for your post-operative appointment. POST-OPERATIVE INSTRUCTIONS AND INFORMATION FOR PATIENTS    Surgery of any kind places a stress on your body. Get adequate rest and avoid strenuous activity for a few days following your procedure. It is important for someone to stay with you until you have recovered from the effects of these medications. Swelling, discomfort, and restricted jaw function are expected, and need not cause alarm. These may be minimized by the following instructions. Please read them carefully. It is strongly urged that they be followed. 1.   BLEEDING  Apply constant pressure on the gauze over the surgical site. This acts as a pressure dressing to control any active bleeding. If bleeding is excessive, place a roll of gauze or a moistened tea bag over the surgical site and bite firmly for 45 minutes with constant pressure. Repeat if necessary. Avoid rinsing, spilling, smoking and drinking through a straw, as this will disturb the blood clot and reinitiate bleeding. Assume a semi-upright position; use two pillows in bed. If significant bleeding still continues, call the office for advice. 2.   SWELLING  Swelling and bruising are normal reactions to surgery, and vary from patient to patient. Application of ice to the surgical sites during the first 24 hours helps reduce the amount of swelling. Use the ice packs 20 minutes on the site 20 minutes off the site or alternate from side to side every 20 minutes. After the first 24 hours, the ice will not have much effect on swelling, but may make the surgical site feel better. Swelling reaches its peak about 48-72 hours after surgery. It is not unusual to have difficulty opening the mouth due to post-operative swelling in the muscles. This should resolve on its own with time.  Application of moist heat 4 to 6 times per day to the surgical sites beginning 24 hours after surgery, increases the blood circulation and helps take away swelling. Bruising will resolve on its own, but may take up to a week or more. 3.   INFECTION  Most surgical procedures in healthy patients have a low risk of developing an infection. Some patients may be placed on antibiotic medication. It is important to follow the directions on the label and take tlie medication until it is completely gone. If you develop hives or a rash, discontinue all medication and contact the office immediately. There may be a slight elevation in temperature for 24 to 48 hours after surgery. This is a normal body response to the surgery. If temperature persists or is elevated above 101 degrees Farenheit, please notify the office. 4. PAIN  After any surgical procedure, swelling and some discomfort are anticipated. Pain normally reaches its peak 48-72 hours after surgery, then starts to decrease. If you only have minor pain, try over-the counter drugs, such as Tylenol,. Advil, or Aleve. Avoid aspirin and aspirin containing products unless this medication was prescribed by another physician, as these interfere with blood clotting. If you have been given a prescription for a stronger pain medication, have it filled at a pharmacy and take the medication as directed. Pain medication should never be taken on an empty stomach. If you develop hives or a rash, discontinue all medication and contact the office immediately. 5.   NAUSEA  Post-operative nausea is usually due to swallowing a small amount of blood during and/or after surgery. A small amount of carbonated drink, such as 7-Up or ginger ale every hour for five to six hours wilt usually relieve this feeling. Sometimes pain medications can cause nausea. If nausea continues, contact the office. 6. DIET  A bland liquid diet is recommended for the day of surgery. Following this, soft food high In protein and vitamins is recommended. Avoid crunchy foods, which may irritate the surgical site.  Resume your normal diet as soon as possible. 7.   ORAL HYGIENE  Do not rinse your mouth for the first 12 hours following your surgery. This will loosen the blood clots and reinitiate bleeding. The day following surgery, the mouth should be rinsed with warm salt water 5 or 6 times a day after meals and at bedtime. If you were given an litigating syringe, use it to gently flush out the extraction site(s) with warm salt water 3-4 times per day, starting three days after surgery. You may brush your teeth beginning the day after surgery. 8.   SMOKING  Smoking is a great irritation to the surgical site. Smoking should be avoided or greatly reduced during the healing period. 9.   SUTURES  Sutures may have been used to close the surgical wound. They are the type that dissolve. They will begin to come out in about 5-7 days. Should you have any post-operative problems or questions, do not hesitate to call the office so we may help. Please call [phone]                 If you are having a medical emergency, call [phone]. DISCHARGE SUMMARY from Nurse    PATIENT INSTRUCTIONS:    After general anesthesia or intravenous sedation, for 24 hours or while taking prescription Narcotics:  · Limit your activities  · Do not drive and operate hazardous machinery  · Do not make important personal or business decisions  · Do  not drink alcoholic beverages  · If you have not urinated within 8 hours after discharge, please contact your surgeon on call.     Report the following to your surgeon:  · Excessive pain, swelling, redness or odor of or around the surgical area  · Temperature over 100.5  · Nausea and vomiting lasting longer than 4 hours or if unable to take medications  · Any signs of decreased circulation or nerve impairment to extremity: change in color, persistent  numbness, tingling, coldness or increase pain  · Any questions    What to do at Home:  Recommended activity: Activity as tolerated and no driving for today,     These are general instructions for a healthy lifestyle:    No smoking/ No tobacco products/ Avoid exposure to second hand smoke  Surgeon General's Warning:  Quitting smoking now greatly reduces serious risk to your health. Obesity, smoking, and sedentary lifestyle greatly increases your risk for illness    A healthy diet, regular physical exercise & weight monitoring are important for maintaining a healthy lifestyle    You may be retaining fluid if you have a history of heart failure or if you experience any of the following symptoms:  Weight gain of 3 pounds or more overnight or 5 pounds in a week, increased swelling in our hands or feet or shortness of breath while lying flat in bed. Please call your doctor as soon as you notice any of these symptoms; do not wait until your next office visit. Recognize signs and symptoms of STROKE:    F-face looks uneven    A-arms unable to move or move unevenly    S-speech slurred or non-existent    T-time-call 911 as soon as signs and symptoms begin-DO NOT go       Back to bed or wait to see if you get better-TIME IS BRAIN. Warning Signs of HEART ATTACK     Call 911 if you have these symptoms:   Chest discomfort. Most heart attacks involve discomfort in the center of the chest that lasts more than a few minutes, or that goes away and comes back. It can feel like uncomfortable pressure, squeezing, fullness, or pain.  Discomfort in other areas of the upper body. Symptoms can include pain or discomfort in one or both arms, the back, neck, jaw, or stomach.  Shortness of breath with or without chest discomfort.  Other signs may include breaking out in a cold sweat, nausea, or lightheadedness. Don't wait more than five minutes to call 911 - MINUTES MATTER! Fast action can save your life. Calling 911 is almost always the fastest way to get lifesaving treatment.  Emergency Medical Services staff can begin treatment when they arrive -- up to an hour sooner than if someone gets to the hospital by car. The discharge information has been reviewed with the patient. The patient verbalized understanding. Discharge medications reviewed with the patient and appropriate educational materials and side effects teaching were provided. Patient armband removed and given to patient to take home.   Patient was informed of the privacy risks if armband lost or stolen

## 2019-06-05 NOTE — ANESTHESIA PREPROCEDURE EVALUATION
Relevant Problems   No relevant active problems       Anesthetic History   No history of anesthetic complications            Review of Systems / Medical History  Patient summary reviewed and pertinent labs reviewed    Pulmonary    COPD: moderate               Neuro/Psych   Within defined limits           Cardiovascular  Within defined limits                Exercise tolerance: >4 METS     GI/Hepatic/Renal           Liver disease     Endo/Other  Within defined limits           Other Findings              Physical Exam    Airway  Mallampati: III  TM Distance: 4 - 6 cm  Neck ROM: decreased range of motion   Mouth opening: Normal     Cardiovascular    Rhythm: regular  Rate: normal         Dental    Dentition: Poor dentition     Pulmonary  Breath sounds clear to auscultation               Abdominal  GI exam deferred       Other Findings            Anesthetic Plan    ASA: 3  Anesthesia type: general          Induction: Intravenous  Anesthetic plan and risks discussed with: Patient

## 2019-06-05 NOTE — ANESTHESIA POSTPROCEDURE EVALUATION
Procedure(s):  Full mouth extractions, hannah removal, alveoplasty. general    Anesthesia Post Evaluation      Multimodal analgesia: multimodal analgesia used between 6 hours prior to anesthesia start to PACU discharge  Patient location during evaluation: bedside  Patient participation: complete - patient participated  Level of consciousness: awake  Pain management: adequate  Airway patency: patent  Anesthetic complications: no  Cardiovascular status: stable  Respiratory status: acceptable  Hydration status: acceptable  Post anesthesia nausea and vomiting:  controlled      Vitals Value Taken Time   /80 6/5/2019  3:50 PM   Temp 36.3 °C (97.4 °F) 6/5/2019  3:35 PM   Pulse 69 6/5/2019  3:54 PM   Resp 20 6/5/2019  3:54 PM   SpO2 90 % 6/5/2019  3:54 PM   Vitals shown include unvalidated device data.

## 2019-06-05 NOTE — BRIEF OP NOTE
BRIEF OPERATIVE NOTE    Date of Procedure: 6/5/2019   Preoperative Diagnosis: K02.9  Postoperative Diagnosis: K02.9    Procedure(s):  Full mouth extractions, hannah removal, alveoplasty  Surgeon(s) and Role:     * Mirian Wheat DDS - Primary         Surgical Assistant: Yessenia Velez    Surgical Staff:  Circ-1: Moe Ortiz  Circ-2: Clinton RIVERS  Scrub Tech-1: Carmen Sommer  Surg Asst-1:  Rena Munoz Time In Time Out   Incision Start 1345    Incision Close 1442      Anesthesia: General via nasotracheal intubation  Estimated Blood Loss: 50mL  Specimens: none  Findings: as expected from pre-op diagnoses   Complications: small tear in the floor of mouth repaired primarily with 3 interrupted sutures  Implants: None

## 2019-06-06 NOTE — OP NOTES
[facility]  OPERATIVE REPORT    Name:  Rogelio Pollard  MR#:   [id number]  :  1985  ACCOUNT #:  [de-identified]  DATE OF SERVICE:  2019    PREOPERATIVE DIAGNOSES:  1. Dental caries. 2.  Dental abscesses. 3.  Retained roots. POSTOPERATIVE DIAGNOSES:  1. Dental caries. 2.  Dental abscesses. 3.  Retained roots. PROCEDURE PERFORMED:  1. Surgical extraction erupted teeth: 4, 5, 6, 8, 9, 13, 14, 20, 21, 22, 27, 28, and 29  2. Simple extraction of erupted teeth: 23, 24, 25, and 26  3. Surgical extraction of retained roots #7, 10, 11, 12, 15, 18, 19, 30, and 31  2. Alveoloplasties in the upper right, lower right, upper left, and lower left quadrants. 3.  Surgical removal of bilateral mandibular hannah. SURGEON:  Maile Saleh DDS    ASSISTANT:  See OR log    ANESTHESIA:  General via nasal endotracheal tube. COMPLICATIONS:  Small mucosal tear 3mm in right floor of mouth. SPECIMENS REMOVED:  None. DRAINS:  None. IMPLANTS:  None. ESTIMATED BLOOD LOSS:  50 mL. COUNTS:  Instrument count was correct x2 per the OR staff. Marlene Kothari FLUIDS:  See Anesthesia records. INDICATIONS:  This patient presented to [facility] for evaluation and extraction of all remaining teeth and removal of mandibular hannah. Due to her medical comorbidities, it was indicated the patient would require the procedure under general anesthesia in hospital setting. Consents were obtained after all risks, benefits and alternatives were discussed and reviewed with the patient. FINDINGS:  As expected from preoperative diagnosis. TECHNIQUE:  The patient was correctly identified in the preoperative holding area and taken to operating room #8. She was transferred from the stretcher to the OR table under her own volition with assistance from the [facility] [address] staff. The patient underwent induction and nasotracheal intubation under satisfactory conditions.   The endotracheal tube was secured with my assistance and the eyes were taped by the Anesthesia team.  The patient was moved away from Anesthesia and prepped and draped in normal fashion for oral surgery. The time-out was performed with all OR staff present. A total of 10cc of 0.5 bupivicaine 1:200,000 was injected. The oral cavity and oropharynx were suctioned and a throat pack was placed. Attention was directed at the upper right quadrant teeth and a crestal incision was made from distal #4, mesial #8 with a distal buccal release. The full mucoperiosteal flap was elevated with a #9 periosteal elevator. Buccal bone was removed from each tooth using a #702 bur. The teeth were elevated and removed with the appropriate forceps. Curettes were used to remove all remaining abscesses at the apices of the extraction sites. The bone was then smoothed with a #4 oval gerhard and a bone file. The flaps were irrigated and the site was closed with 3-0 chromic gut suture in continuous, locking fashion. Attention was directed at the lower right quadrant. A sulcular incision was made from the distal tooth #31 to mesial tooth #25 with a distobuccal release. Full mucoperiosteal flap was elevated with a #9 periosteal elevator. Buccal bone was removed from all teeth in the quadrant using a #702 bur. The teeth were elevated and removed with the appropriate forceps. All extraction sites were curetted and abscesses were removed with a hemostat. A lingual flap was then elevated with a #9 periosteal  elevator exposing the lingual torus. The alveoloplasty and lingual torus were removed using a #4 oval gerhard under constant irrigation. A small tear was noted in the right floor of mouth ~3mm in length. The site of the extractions, alveoloplasties, mucosal tear, and mandibular torus removal were then irrigated with normal saline. The site of the extractions was then closed in continuous, locking fashion with 3-0 chromic gut suture.   The site of the mucosal tear was closed in interrupted fashion with 3-0 chromic gut. Attention was directed to the left side of the oral cavity and all remaining teeth, alveoloplasties, and removal of mandibular hannah were completed in similar fashion without a tear in the floor of mouth mucosa. The oral cavity was irrigated and suctioned dry. The throat pack was suctioned and removed. The patient was then undraped, moved towards Anesthesia, and underwent emergence and extubation without complication. The patient [facility] and taken to PACU in stable condition.         PALMA Phan/[email or web address]  D:  06/05/2019 15:08  T:  06/05/2019 17:26  JOB #:  [id number]

## 2019-06-22 ENCOUNTER — HOSPITAL ENCOUNTER (EMERGENCY)
Age: 34
Discharge: HOME | End: 2019-06-23
Attending: EMERGENCY MEDICINE | Admitting: EMERGENCY MEDICINE
Payer: MEDICAID

## 2019-06-22 ENCOUNTER — APPOINTMENT (OUTPATIENT)
Dept: CT IMAGING | Age: 34
End: 2019-06-22
Attending: PHYSICIAN ASSISTANT
Payer: MEDICAID

## 2019-06-22 DIAGNOSIS — L03.211 FACIAL CELLULITIS: ICD-10-CM

## 2019-06-22 DIAGNOSIS — M86.9 BONE INFECTION (HCC): Primary | ICD-10-CM

## 2019-06-22 LAB
ANION GAP SERPL CALC-SCNC: 7 MMOL/L (ref 3–18)
BASOPHILS # BLD: 0.1 K/UL (ref 0–0.1)
BASOPHILS NFR BLD: 1 % (ref 0–2)
BUN SERPL-MCNC: 7 MG/DL (ref 7–18)
BUN/CREAT SERPL: 10 (ref 12–20)
CALCIUM SERPL-MCNC: 8.5 MG/DL (ref 8.5–10.1)
CHLORIDE SERPL-SCNC: 106 MMOL/L (ref 100–108)
CO2 SERPL-SCNC: 26 MMOL/L (ref 21–32)
CREAT SERPL-MCNC: 0.67 MG/DL (ref 0.6–1.3)
DIFFERENTIAL METHOD BLD: ABNORMAL
EOSINOPHIL # BLD: 0.3 K/UL (ref 0–0.4)
EOSINOPHIL NFR BLD: 2 % (ref 0–5)
ERYTHROCYTE [DISTWIDTH] IN BLOOD BY AUTOMATED COUNT: 13.5 % (ref 11.6–14.5)
GLUCOSE SERPL-MCNC: 98 MG/DL (ref 74–99)
HCT VFR BLD AUTO: 40.5 % (ref 35–45)
HGB BLD-MCNC: 13.9 G/DL (ref 12–16)
LYMPHOCYTES # BLD: 4.1 K/UL (ref 0.9–3.6)
LYMPHOCYTES NFR BLD: 32 % (ref 21–52)
MCH RBC QN AUTO: 33.6 PG (ref 24–34)
MCHC RBC AUTO-ENTMCNC: 34.3 G/DL (ref 31–37)
MCV RBC AUTO: 97.8 FL (ref 74–97)
MONOCYTES # BLD: 1 K/UL (ref 0.05–1.2)
MONOCYTES NFR BLD: 8 % (ref 3–10)
NEUTS SEG # BLD: 7.4 K/UL (ref 1.8–8)
NEUTS SEG NFR BLD: 57 % (ref 40–73)
PLATELET # BLD AUTO: 364 K/UL (ref 135–420)
PMV BLD AUTO: 8.7 FL (ref 9.2–11.8)
POTASSIUM SERPL-SCNC: 3.7 MMOL/L (ref 3.5–5.5)
RBC # BLD AUTO: 4.14 M/UL (ref 4.2–5.3)
SODIUM SERPL-SCNC: 139 MMOL/L (ref 136–145)
WBC # BLD AUTO: 12.8 K/UL (ref 4.6–13.2)

## 2019-06-22 PROCEDURE — 85025 COMPLETE CBC W/AUTO DIFF WBC: CPT

## 2019-06-22 PROCEDURE — 74011250637 HC RX REV CODE- 250/637: Performed by: EMERGENCY MEDICINE

## 2019-06-22 PROCEDURE — 80048 BASIC METABOLIC PNL TOTAL CA: CPT

## 2019-06-22 PROCEDURE — 96374 THER/PROPH/DIAG INJ IV PUSH: CPT

## 2019-06-22 PROCEDURE — 74011636320 HC RX REV CODE- 636/320: Performed by: EMERGENCY MEDICINE

## 2019-06-22 PROCEDURE — 99284 EMERGENCY DEPT VISIT MOD MDM: CPT

## 2019-06-22 PROCEDURE — 70487 CT MAXILLOFACIAL W/DYE: CPT

## 2019-06-22 PROCEDURE — 74011250636 HC RX REV CODE- 250/636: Performed by: PHYSICIAN ASSISTANT

## 2019-06-22 RX ORDER — HYDROCODONE BITARTRATE AND ACETAMINOPHEN 5; 325 MG/1; MG/1
1 TABLET ORAL
Status: COMPLETED | OUTPATIENT
Start: 2019-06-22 | End: 2019-06-22

## 2019-06-22 RX ORDER — KETOROLAC TROMETHAMINE 30 MG/ML
30 INJECTION, SOLUTION INTRAMUSCULAR; INTRAVENOUS
Status: COMPLETED | OUTPATIENT
Start: 2019-06-22 | End: 2019-06-22

## 2019-06-22 RX ADMIN — IOPAMIDOL 80 ML: 755 INJECTION, SOLUTION INTRAVENOUS at 22:12

## 2019-06-22 RX ADMIN — HYDROCODONE BITARTRATE AND ACETAMINOPHEN 1 TABLET: 5; 325 TABLET ORAL at 22:40

## 2019-06-22 RX ADMIN — KETOROLAC TROMETHAMINE 30 MG: 30 INJECTION, SOLUTION INTRAMUSCULAR at 21:53

## 2019-06-23 VITALS
OXYGEN SATURATION: 100 % | DIASTOLIC BLOOD PRESSURE: 81 MMHG | BODY MASS INDEX: 31.65 KG/M2 | RESPIRATION RATE: 14 BRPM | WEIGHT: 172 LBS | TEMPERATURE: 97.8 F | HEART RATE: 74 BPM | HEIGHT: 62 IN | SYSTOLIC BLOOD PRESSURE: 115 MMHG

## 2019-06-23 PROCEDURE — 74011250637 HC RX REV CODE- 250/637: Performed by: PHYSICIAN ASSISTANT

## 2019-06-23 RX ORDER — CLINDAMYCIN HYDROCHLORIDE 300 MG/1
300 CAPSULE ORAL 4 TIMES DAILY
Qty: 40 CAP | Refills: 0 | Status: SHIPPED | OUTPATIENT
Start: 2019-06-23 | End: 2019-07-03

## 2019-06-23 RX ORDER — TRAMADOL HYDROCHLORIDE 50 MG/1
50 TABLET ORAL
Qty: 15 TAB | Refills: 0 | Status: SHIPPED | OUTPATIENT
Start: 2019-06-23 | End: 2019-06-26

## 2019-06-23 RX ORDER — CLINDAMYCIN HYDROCHLORIDE 150 MG/1
300 CAPSULE ORAL
Status: COMPLETED | OUTPATIENT
Start: 2019-06-23 | End: 2019-06-23

## 2019-06-23 RX ORDER — IBUPROFEN 800 MG/1
800 TABLET ORAL
Qty: 20 TAB | Refills: 0 | Status: SHIPPED | OUTPATIENT
Start: 2019-06-23 | End: 2019-06-30

## 2019-06-23 RX ORDER — HYDROCODONE BITARTRATE AND ACETAMINOPHEN 5; 325 MG/1; MG/1
1 TABLET ORAL
Status: COMPLETED | OUTPATIENT
Start: 2019-06-23 | End: 2019-06-23

## 2019-06-23 RX ADMIN — HYDROCODONE BITARTRATE AND ACETAMINOPHEN 1 TABLET: 5; 325 TABLET ORAL at 00:20

## 2019-06-23 RX ADMIN — CLINDAMYCIN HYDROCHLORIDE 300 MG: 150 CAPSULE ORAL at 00:20

## 2019-06-23 NOTE — DISCHARGE INSTRUCTIONS

## 2019-06-23 NOTE — ED PROVIDER NOTES
EMERGENCY DEPARTMENT HISTORY AND PHYSICAL EXAM    Date: 6/22/2019  Patient Name: Staci Lozano    History of Presenting Illness     Chief Complaint   Patient presents with    Facial Swelling         History Provided By: Patient    Chief Complaint: facial swelling  Duration: 1 Days  Timing:  Acute  Location: right side of face  Quality: Aching and Tightness  Severity: Moderate  Modifying Factors: none  Associated Symptoms: denies any other associated signs or symptoms      HPI: Staci Lozano is a 26 y.o. female with a PMH of Schizoaffective psychosis, anal fissure, hep C, COPD who presents to the ER complaining of right-sided facial swelling. Patient states she woke up this morning and noted swelling to the right side of her face. She recently had dental surgery at the beginning of the month and had all of her teeth removed due to poor dentition and caries. She reports recovering without difficulty however he noted the swelling today concerning for cellulitis or abscess. She has not had any recent fevers. She is not taking medications for her symptoms. She has no other symptoms or complaints. PCP: Miracle Dahl MD    Current Facility-Administered Medications   Medication Dose Route Frequency Provider Last Rate Last Dose    clindamycin (CLEOCIN) capsule 300 mg  300 mg Oral NOW Irene Kiser PA-C        HYDROcodone-acetaminophen (NORCO) 5-325 mg per tablet 1 Tab  1 Tab Oral NOW Lana Thomas PA-C         Current Outpatient Medications   Medication Sig Dispense Refill    clindamycin (CLEOCIN) 300 mg capsule Take 1 Cap by mouth four (4) times daily for 10 days. 40 Cap 0    traMADol (ULTRAM) 50 mg tablet Take 1 Tab by mouth every six (6) hours as needed for Pain for up to 3 days. Max Daily Amount: 200 mg. 15 Tab 0    ibuprofen (MOTRIN) 800 mg tablet Take 1 Tab by mouth every eight (8) hours as needed for Pain for up to 7 days.  20 Tab 0    ibuprofen (MOTRIN) 200 mg tablet Take 4 Tabs by mouth every six (6) hours as needed for Pain. 20 Tab 0    clindamycin (CLEOCIN) 150 mg capsule Take 2 Caps by mouth four (4) times daily. 20 Cap 0    ondansetron hcl (ZOFRAN) 8 mg tablet TAKE ONE TABLET BY MOUTH EVERY 8 HOUR AS NEEDED FOR NAUSEA 30 Tab 0    VENTOLIN HFA 90 mcg/actuation inhaler INHALE 1 PUFF EVERY 4 HOURS AS NEEDED FOR WHEEZING 2 Inhaler 1    gabapentin (NEURONTIN) 300 mg capsule Take 2 Caps by mouth three (3) times daily. 180 Cap 11    naproxen (NAPROSYN) 500 mg tablet Take 1 Tab by mouth two (2) times daily (with meals). 60 Tab 11    QUEtiapine (SEROQUEL) 300 mg tablet Take 3 Tabs by mouth nightly. 90 Tab 11    albuterol (PROVENTIL VENTOLIN) 2.5 mg /3 mL (0.083 %) nebulizer solution 3 mL by Nebulization route every four (4) hours as needed for Wheezing. 60 Each 0    mirtazapine (REMERON) 45 mg tablet Take 1 Tab by mouth nightly. 30 Tab 11    atenolol (TENORMIN) 50 mg tablet Take 1 Tab by mouth daily. 30 Tab 11    busPIRone (BUSPAR) 15 mg tablet Take 1 Tab by mouth three (3) times daily as needed. 90 Tab 11    baclofen (LIORESAL) 20 mg tablet Take 1 Tab by mouth three (3) times daily. 90 Tab 11    sertraline (ZOLOFT) 100 mg tablet Take 2 Tabs by mouth daily. 60 Tab 11    albuterol sulfate (PROAIR RESPICLICK) 90 mcg/actuation aepb Take 2 Puffs by inhalation every four (4) hours as needed.  1 Inhaler 0       Past History     Past Medical History:  Past Medical History:   Diagnosis Date    Anal fissure     Chronic obstructive pulmonary disease [facility] [address]     Hep C w/o coma, chronic (HCC)     Methamphetamine abuse in remission ([address]     Schizo-affective psychosis ([facility] [address]     Tachycardia        Past Surgical History:  Past Surgical History:   Procedure Laterality Date    HX ADENOIDECTOMY      HX APPENDECTOMY      HX CHOLECYSTECTOMY      HX COLONOSCOPY  2014    HX DILATION AND CURETTAGE      HX HYSTERECTOMY      HX TONSILLECTOMY      MT THORACOSCOPY W/LOBECTOMY SINGLE LOBE Right     [place]edge       Family History:  Family History   Problem Relation Age of Onset   [facility] Cancer Mother 62        lung    Alcohol abuse Mother     Heart Disease Father     Alcohol abuse Father        Social History:  Social History     Tobacco Use    Smoking status: Current Every Day Smoker     Packs/day: 1.50     Years: 20.00     Pack years: 30.00     Types: Cigarettes    Smokeless tobacco: Never Used   Substance Use Topics    Alcohol use: No    Drug use: Not Currently     Types: Methamphetamines     Comment: 20 months clean       Allergies: Allergies   Allergen Reactions    Sulfa (Sulfonamide Antibiotics) Anaphylaxis    Amoxicillin Hives    Toradol [Ketorolac] Nausea and Vomiting    Vistaril [Hydroxyzine Pamoate] Other (comments)     Mean         Review of Systems   Review of Systems   Constitutional: Negative for chills, fatigue and fever. HENT: Positive for facial swelling. Negative for sore throat. Eyes: Negative. Respiratory: Negative for cough and shortness of breath. Cardiovascular: Negative for chest pain and palpitations. Gastrointestinal: Negative for abdominal pain, nausea and vomiting. Genitourinary: Negative for dysuria. Musculoskeletal: Negative. Skin: Negative. Neurological: Negative for dizziness, weakness, light-headedness and headaches. Psychiatric/Behavioral: Negative. All other systems reviewed and are negative. Physical Exam     Vitals:    06/22/19 2002 06/22/19 2150 06/22/19 2304   BP: 132/81 114/69 109/62   Pulse: 80 84 73   Resp: 13 16 16   Temp: 97.8 °F (36.6 °C)     SpO2: 97% 99% 96%   Weight: 78 kg (172 lb)     Height: 5' 2\" (1.575 m)       Physical Exam   Constitutional: She is oriented to person, place, and time. She appears well-developed and well-nourished. No distress. HENT:   Head: Normocephalic and atraumatic.        Right Ear: External ear and ear canal normal.   Left Ear: External ear and ear canal normal.   Nose: Nose normal.   Mouth/Throat: Uvula is midline, oropharynx is clear and moist and mucous membranes are normal. No trismus in the jaw. Abnormal dentition. No uvula swelling. All dentition recently removed   Eyes: Conjunctivae are normal. No scleral icterus. Neck: Neck supple. No JVD present. No tracheal deviation present. Cardiovascular: Normal rate, regular rhythm and normal heart sounds. No murmur heard. Pulmonary/Chest: Effort normal and breath sounds normal. No respiratory distress. She has no wheezes. She has no rales. Abdominal: Soft. There is no tenderness. Musculoskeletal: Normal range of motion. Lymphadenopathy:     She has no cervical adenopathy. Neurological: She is alert and oriented to person, place, and time. She has normal strength. Gait normal. GCS eye subscore is 4. GCS verbal subscore is 5. GCS motor subscore is 6. Skin: Skin is warm and dry. She is not diaphoretic. Psychiatric: She has a normal mood and affect. Nursing note and vitals reviewed.         Diagnostic Study Results     Labs -     Recent Results (from the past 12 hour(s))   METABOLIC PANEL, BASIC    Collection Time: 06/22/19  9:30 PM   Result Value Ref Range    Sodium 139 136 - 145 mmol/L    Potassium 3.7 3.5 - 5.5 mmol/L    Chloride 106 100 - 108 mmol/L    CO2 26 21 - 32 mmol/L    Anion gap 7 3.0 - 18 mmol/L    Glucose 98 74 - 99 mg/dL    BUN 7 7.0 - 18 MG/DL    Creatinine 0.67 0.6 - 1.3 MG/DL    BUN/Creatinine ratio 10 (L) 12 - 20      GFR est AA >60 >60 ml/min/1.73m2    GFR est non-AA >60 >60 ml/min/1.73m2    Calcium 8.5 8.5 - 10.1 MG/DL   CBC WITH AUTOMATED DIFF    Collection Time: 06/22/19  9:30 PM   Result Value Ref Range    WBC 12.8 4.6 - 13.2 K/uL    RBC 4.14 (L) 4.20 - 5.30 M/uL    HGB 13.9 12.0 - 16.0 g/dL    HCT 40.5 35.0 - 45.0 %    MCV 97.8 (H) 74.0 - 97.0 FL    MCH 33.6 24.0 - 34.0 PG    MCHC 34.3 31.0 - 37.0 g/dL    RDW 13.5 11.6 - 14.5 %    PLATELET 269 449 - 931 K/uL    MPV 8.7 (L) 9.2 - 11.8 FL    NEUTROPHILS 57 40 - 73 %    LYMPHOCYTES 32 21 - 52 %    MONOCYTES 8 3 - 10 %    EOSINOPHILS 2 0 - 5 %    BASOPHILS 1 0 - 2 %    ABS. NEUTROPHILS 7.4 1.8 - 8.0 K/UL    ABS. LYMPHOCYTES 4.1 (H) 0.9 - 3.6 K/UL    ABS. MONOCYTES 1.0 0.05 - 1.2 K/UL    ABS. EOSINOPHILS 0.3 0.0 - 0.4 K/UL    ABS. BASOPHILS 0.1 0.0 - 0.1 K/UL    DF AUTOMATED         Radiologic Studies -   CT MAXILLOFACIAL W CONT    (Results Pending)     CT Results  (Last 48 hours)    None        CXR Results  (Last 48 hours)    None            Medical Decision Making   I am the first provider for this patient. I reviewed the vital signs, available nursing notes, past medical history, past surgical history, family history and social history. Vital Signs-Reviewed the patient's vital signs. Records Reviewed: Nursing Notes and Old Medical Records     516 PM  69-year-old female who presents the ER complaining of acute onset of right-sided facial swelling. Patient reports recent dental surgery to have all of her teeth removed earlier this month. She had recovered appropriately however woke up this morning with acute swelling to her right side of her face concerning for infection. No recent fevers. We will plan on labs and appropriate imaging to rule out developing abscess. Jeanette Green PA-C     12:15 AM  Patient noted to have mildly elevated white count. CT of the maxillofacial with contrast shows right midface cellulitis. 1.5 cm area of bony erosion. Early for phlegmatous change without a drainable abscess. Erosive changes concerning for possible osteomyelitis. Discussed patient with PALMA Moreira who performed pts surgery a few weeks prior. He advised patient did not make her office follow-up last week. He recommends starting her on clindamycin 300 mg 4 times daily for 10 days and have her follow-up in the office this week. Also recommends to have her consult with infectious disease as an outpatient as she may need IV antibiotics for possible osteomyelitis.   Discussed all results and plan of care with patient. Will give initial dose of antibiotics here while in the ER and plan for discharge. All questions answered and patient in agreement with plan of care. Will plan for discharge. Christina Cox PA-C    Disposition:  discharged    DISCHARGE NOTE:       Care plan outlined and precautions discussed. Patient has no new complaints, changes, or physical findings. Results of labs, imaging were reviewed with the patient. All medications were reviewed with the patient; will d/c home with clindamycin, motrin and tramadol. All of pt's questions and concerns were addressed. Patient was instructed and agrees to follow up with Dr. Emi Horton and I/D, as well as to return to the ED upon further deterioration. Patient is ready to go home. Follow-up Information     Follow up With Specialties Details Why [facility] EMERGENCY DEPT Emergency Medicine  If symptoms worsen [address] [place] [address] [address] [place] [address]    Juan Price DDS Oral Surgery Call in 2 days dental follow up for facial cellulitis [id number]      Linh Skinner MD Infectious Diseases, Internal Medicine Call in 2 days Infectious disease follow up for suspected osteomyelitis of maxilla bone [place] [address]  [phone]            Current Discharge Medication List      START taking these medications    Details   !! clindamycin (CLEOCIN) 300 mg capsule Take 1 Cap by mouth four (4) times daily for 10 days. Qty: 40 Cap, Refills: 0      traMADol (ULTRAM) 50 mg tablet Take 1 Tab by mouth every six (6) hours as needed for Pain for up to 3 days. Max Daily Amount: 200 mg. Qty: 15 Tab, Refills: 0    Associated Diagnoses: Bone infection ([address] Facial cellulitis      !! ibuprofen (MOTRIN) 800 mg tablet Take 1 Tab by mouth every eight (8) hours as needed for Pain for up to 7 days.   Qty: 20 Tab, Refills: 0       !! - Potential duplicate medications found. Please discuss with provider. CONTINUE these medications which have NOT CHANGED    Details   !! ibuprofen (MOTRIN) 200 mg tablet Take 4 Tabs by mouth every six (6) hours as needed for Pain. Qty: 20 Tab, Refills: 0      !! clindamycin (CLEOCIN) 150 mg capsule Take 2 Caps by mouth four (4) times daily. Qty: 20 Cap, Refills: 0      ondansetron hcl (ZOFRAN) 8 mg tablet TAKE ONE TABLET BY MOUTH EVERY 8 HOUR AS NEEDED FOR NAUSEA  Qty: 30 Tab, Refills: 0    Associated Diagnoses: Nausea      VENTOLIN HFA 90 mcg/actuation inhaler INHALE 1 PUFF EVERY 4 HOURS AS NEEDED FOR WHEEZING  Qty: 2 Inhaler, Refills: 1    Associated Diagnoses: Mucopurulent chronic bronchitis (HCC)      gabapentin (NEURONTIN) 300 mg capsule Take 2 Caps by mouth three (3) times daily. Qty: 180 Cap, Refills: 11    Comments: Discontinue previous dose  Associated Diagnoses: Neuropathy      naproxen (NAPROSYN) 500 mg tablet Take 1 Tab by mouth two (2) times daily (with meals). Qty: 60 Tab, Refills: 11    Associated Diagnoses: Lumbar pain      QUEtiapine (SEROQUEL) 300 mg tablet Take 3 Tabs by mouth nightly. Qty: 90 Tab, Refills: 11    Comments: Discontinue previous order  Associated Diagnoses: Anxiety; Bipolar disorder, current episode depressed, severe, with psychotic features (HCC)      albuterol (PROVENTIL VENTOLIN) 2.5 mg /3 mL (0.083 %) nebulizer solution 3 mL by Nebulization route every four (4) hours as needed for Wheezing. Qty: 60 Each, Refills: 0    Associated Diagnoses: Mucopurulent chronic bronchitis (HCC)      mirtazapine (REMERON) 45 mg tablet Take 1 Tab by mouth nightly. Qty: 30 Tab, Refills: 11    Associated Diagnoses: Anxiety      atenolol (TENORMIN) 50 mg tablet Take 1 Tab by mouth daily. Qty: 30 Tab, Refills: 11    Associated Diagnoses: Tachycardia      busPIRone (BUSPAR) 15 mg tablet Take 1 Tab by mouth three (3) times daily as needed.   Qty: 90 Tab, Refills: 11    Comments: Discontinue previous order  Associated Diagnoses: Anxiety      baclofen (LIORESAL) 20 mg tablet Take 1 Tab by mouth three (3) times daily. Qty: 90 Tab, Refills: 11    Associated Diagnoses: Muscle spasm      sertraline (ZOLOFT) 100 mg tablet Take 2 Tabs by mouth daily. Qty: 60 Tab, Refills: 11    Associated Diagnoses: Anxiety      albuterol sulfate (PROAIR RESPICLICK) 90 mcg/actuation aepb Take 2 Puffs by inhalation every four (4) hours as needed. Qty: 1 Inhaler, Refills: 0       !! - Potential duplicate medications found. Please discuss with provider. Provider Notes (Medical Decision Making):     Procedures:  Procedures        Diagnosis     Clinical Impression:   1. Bone infection ([address]    2.  Facial cellulitis

## 2019-06-23 NOTE — ED TRIAGE NOTES
Ambulatory to triage with c/o swelling to right side of face. Patient states she had extensive oral surgery on 6/5/19.

## 2019-06-23 NOTE — ED NOTES
I have reviewed discharge instructions with the patient. The patient verbalized understanding. Patient verbalized understanding not to drive due narcotic pain medication given to her during her visit. Patient's , Karson Urena, who is present in ED agrees to drive patient home. Patient ambulatory out of ED.

## 2019-07-29 ENCOUNTER — OFFICE VISIT (OUTPATIENT)
Dept: FAMILY MEDICINE CLINIC | Age: 34
End: 2019-07-29

## 2019-07-29 VITALS
BODY MASS INDEX: 31.1 KG/M2 | TEMPERATURE: 99.5 F | HEART RATE: 77 BPM | RESPIRATION RATE: 18 BRPM | OXYGEN SATURATION: 96 % | SYSTOLIC BLOOD PRESSURE: 114 MMHG | DIASTOLIC BLOOD PRESSURE: 77 MMHG | WEIGHT: 169 LBS | HEIGHT: 62 IN

## 2019-07-29 DIAGNOSIS — F43.10 PTSD (POST-TRAUMATIC STRESS DISORDER): ICD-10-CM

## 2019-07-29 DIAGNOSIS — F31.5 BIPOLAR DISORDER, CURRENT EPISODE DEPRESSED, SEVERE, WITH PSYCHOTIC FEATURES (HCC): Primary | ICD-10-CM

## 2019-07-29 DIAGNOSIS — F41.9 ANXIETY: ICD-10-CM

## 2019-07-29 RX ORDER — HYDROXYZINE 25 MG/1
25 TABLET, FILM COATED ORAL
Qty: 90 TAB | Refills: 1 | Status: SHIPPED | OUTPATIENT
Start: 2019-07-29 | End: 2019-10-16 | Stop reason: SDUPTHER

## 2019-07-29 NOTE — PROGRESS NOTES
1. Have you been to the ER, urgent care clinic since your last visit? Hospitalized since your last visit? No    2. Have you seen or consulted any other health care providers outside of the [address] [place] [address] since your last visit? Include any pap smears or colon screening.  No

## 2019-07-29 NOTE — PROGRESS NOTES
Siva Hull is a 26 y.o.  female and presents with    Chief Complaint   Patient presents with    Medication Evaluation     Subjective:  Ms. Inés Schmid presents c/o increased anxiety and anger and fear and her medications are not effective. She is worried that it is the anniversary of her mother's death and her boyfriend. She is with her boyfriend who verifies her reports of anger and anxiety and paranoid behavior. She has been depressed over her anger. She has been taking quetiapine 900 mg at bedtime. She is taking buspirone for anxiety. She has not established care with a psychiatrist.       ROS   General ROS: positive for  - fatigue  negative for - chills or fever  Psychological ROS: positive for - anxiety  Ophthalmic ROS: positive for - blurry vision  ENT ROS: positive for - headaches  Endocrine ROS: negative for - temperature intolerance  Respiratory ROS: no cough, shortness of breath, or wheezing  Cardiovascular ROS: no chest pain or dyspnea on exertion  Gastrointestinal ROS: positive for - nausea; no ongoing bowel incontinence  Genito-Urinary ROS: no dysuria, trouble voiding, or hematuria  Dermatological ROS: no rashes or lesions     Objective:  Vitals:    07/29/19 1450   BP: 114/77   Pulse: 77   Resp: 18   Temp: 99.5 °F (37.5 °C)   TempSrc: Oral   SpO2: 96%   Weight: 169 lb (76.7 kg)   Height: 5' 2\" (1.575 m)   PainSc:   0 - No pain        alert, well appearing, and in no distress, oriented to person, place, and time, anxious and crying  Chest - clear to auscultation, no wheezes, rales or rhonchi, symmetric air entry  Heart - normal rate, regular rhythm, normal S1, S2, no murmurs, rubs, clicks or gallops    Assessment/Plan:    1. Anxiety  Start hydroxyzine; no bzd at this time    2. Bipolar disorder, current episode depressed, severe, with psychotic features ([facility] [address]  Pt has undergone screening    3.  PTSD (post-traumatic stress disorder)  Continue buspirone      Lab review: no lab studies available for review at time of visit      I have discussed the diagnosis with the patient and the intended plan as seen in the above orders. The patient has received an after-visit summary and questions were answered concerning future plans. I have discussed medication side effects and warnings with the patient as well. I have reviewed the plan of care with the patient, accepted their input and they are in agreement with the treatment goals.

## 2019-08-08 DIAGNOSIS — G62.9 NEUROPATHY: ICD-10-CM

## 2019-08-08 NOTE — TELEPHONE ENCOUNTER
Patient needs hard copy of gabapentin printed, she cannot refill until then. Please advise, thank you.

## 2019-08-09 ENCOUNTER — TELEPHONE (OUTPATIENT)
Dept: FAMILY MEDICINE CLINIC | Age: 34
End: 2019-08-09

## 2019-08-09 RX ORDER — GABAPENTIN 300 MG/1
600 CAPSULE ORAL 3 TIMES DAILY
Qty: 180 CAP | Refills: 1 | Status: SHIPPED | OUTPATIENT
Start: 2019-08-09 | End: 2019-10-16 | Stop reason: SDUPTHER

## 2019-09-13 DIAGNOSIS — R00.0 TACHYCARDIA: ICD-10-CM

## 2019-09-16 ENCOUNTER — CLINICAL SUPPORT (OUTPATIENT)
Dept: FAMILY MEDICINE CLINIC | Age: 34
End: 2019-09-16

## 2019-09-16 VITALS
TEMPERATURE: 98.6 F | HEART RATE: 88 BPM | RESPIRATION RATE: 18 BRPM | OXYGEN SATURATION: 98 % | HEIGHT: 62 IN | WEIGHT: 165 LBS | BODY MASS INDEX: 30.36 KG/M2

## 2019-09-16 DIAGNOSIS — Z23 NEED FOR VACCINATION FOR PNEUMOCOCCUS: ICD-10-CM

## 2019-09-16 DIAGNOSIS — Z23 NEEDS FLU SHOT: Primary | ICD-10-CM

## 2019-09-16 NOTE — PROGRESS NOTES
1. Have you been to the ER, urgent care clinic since your last visit? Hospitalized since your last visit? No    2. Have you seen or consulted any other health care providers outside of the [address] [place] [address] since your last visit? Include any pap smears or colon screening.  No

## 2019-09-17 RX ORDER — ATENOLOL 50 MG/1
TABLET ORAL
Qty: 30 TAB | Refills: 0 | Status: SHIPPED | OUTPATIENT
Start: 2019-09-17 | End: 2019-10-16 | Stop reason: SDUPTHER

## 2019-09-20 DIAGNOSIS — M62.838 MUSCLE SPASM: ICD-10-CM

## 2019-09-23 RX ORDER — BACLOFEN 20 MG/1
TABLET ORAL
Qty: 90 TAB | Refills: 0 | Status: SHIPPED | OUTPATIENT
Start: 2019-09-23 | End: 2019-10-16 | Stop reason: SDUPTHER

## 2019-10-16 ENCOUNTER — OFFICE VISIT (OUTPATIENT)
Dept: FAMILY MEDICINE CLINIC | Age: 34
End: 2019-10-16

## 2019-10-16 VITALS
RESPIRATION RATE: 16 BRPM | DIASTOLIC BLOOD PRESSURE: 79 MMHG | BODY MASS INDEX: 29.92 KG/M2 | OXYGEN SATURATION: 96 % | HEIGHT: 62 IN | WEIGHT: 162.6 LBS | SYSTOLIC BLOOD PRESSURE: 110 MMHG | HEART RATE: 91 BPM | TEMPERATURE: 98.2 F

## 2019-10-16 DIAGNOSIS — M62.838 MUSCLE SPASM: ICD-10-CM

## 2019-10-16 DIAGNOSIS — R00.0 TACHYCARDIA: ICD-10-CM

## 2019-10-16 DIAGNOSIS — F07.81 POST CONCUSSION SYNDROME: Primary | ICD-10-CM

## 2019-10-16 DIAGNOSIS — J41.1 MUCOPURULENT CHRONIC BRONCHITIS (HCC): ICD-10-CM

## 2019-10-16 DIAGNOSIS — F31.5 BIPOLAR DISORDER, CURRENT EPISODE DEPRESSED, SEVERE, WITH PSYCHOTIC FEATURES (HCC): ICD-10-CM

## 2019-10-16 DIAGNOSIS — F41.9 ANXIETY: ICD-10-CM

## 2019-10-16 DIAGNOSIS — G62.9 NEUROPATHY: ICD-10-CM

## 2019-10-16 DIAGNOSIS — F25.1 SCHIZOAFFECTIVE DISORDER, DEPRESSIVE TYPE (HCC): ICD-10-CM

## 2019-10-16 RX ORDER — BACLOFEN 20 MG/1
TABLET ORAL
Qty: 90 TAB | Refills: 11 | Status: SHIPPED | OUTPATIENT
Start: 2019-10-16

## 2019-10-16 RX ORDER — GABAPENTIN 300 MG/1
600 CAPSULE ORAL 3 TIMES DAILY
Qty: 180 CAP | Refills: 3 | Status: SHIPPED | OUTPATIENT
Start: 2019-10-16 | End: 2020-02-04

## 2019-10-16 RX ORDER — HYDROXYZINE 25 MG/1
25 TABLET, FILM COATED ORAL
Qty: 90 TAB | Refills: 11 | Status: SHIPPED | OUTPATIENT
Start: 2019-10-16 | End: 2020-02-19 | Stop reason: DRUGHIGH

## 2019-10-16 RX ORDER — SERTRALINE HYDROCHLORIDE 100 MG/1
100 TABLET, FILM COATED ORAL DAILY
Qty: 30 TAB | Refills: 11 | Status: SHIPPED | OUTPATIENT
Start: 2019-10-16 | End: 2020-02-19 | Stop reason: DRUGHIGH

## 2019-10-16 RX ORDER — BUSPIRONE HYDROCHLORIDE 15 MG/1
15 TABLET ORAL
Qty: 90 TAB | Refills: 11 | Status: SHIPPED | OUTPATIENT
Start: 2019-10-16

## 2019-10-16 RX ORDER — ATENOLOL 50 MG/1
TABLET ORAL
Qty: 90 TAB | Refills: 3 | Status: SHIPPED | OUTPATIENT
Start: 2019-10-16

## 2019-10-16 NOTE — PROGRESS NOTES
1. Have you been to the ER, urgent care clinic since your last visit? Hospitalized since your last visit? No    2. Have you seen or consulted any other health care providers outside of the [address] [place] [address] since your last visit? Include any pap smears or colon screening.  No

## 2019-10-16 NOTE — PROGRESS NOTES
Linda Lopez is a 26 y.o.  female and presents with    Chief Complaint   Patient presents with    Ear Fullness    Medication Refill     Subjective:  Ms. Caryle Hun presents c/o increased anxiety and anger and fear . She has tried to get in with psychiatry and has been turned down by 5 offices; she c/o post concussive syndrome. She has been depressed over her anger. Jaswant Gandara has been taking quetiapine 900 mg at bedtime. She is taking buspirone for anxiety.  She has not established care with a psychiatrist.       Asthma  Current control: Fair   Current level: moderate persistent  Current symptoms: cough  Current controller: none  Last flareup: 2 weeks ago. Number of flareups in past year:3-4  Current symptom relief med: Proventil    ROS   General ROS: positive for  - fatigue  negative for - chills or fever  Psychological ROS: positive for - anxiety  Ophthalmic ROS: positive for - blurry vision  ENT ROS: positive for - headaches; she c/o left ear fullness; she denies pain  Endocrine ROS: negative for - temperature intolerance  Respiratory ROS: no cough, shortness of breath, or wheezing  Cardiovascular ROS: no chest pain or dyspnea on exertion  Gastrointestinal ROS: positive for - nausea; no ongoing bowel incontinence  Genito-Urinary ROS: no dysuria, trouble voiding, or hematuria  Dermatological ROS: no rashes or lesions  All other systems reviewed and are negative. Objective:  Vitals:    10/16/19 0920   BP: 110/79   Pulse: 91   Resp: 16   Temp: 98.2 °F (36.8 °C)   TempSrc: Oral   SpO2: 96%   Weight: 162 lb 9.6 oz (73.8 kg)   Height: 5' 2\" (1.575 m)   PainSc:   0 - No pain     alert, well appearing, and in no distress, oriented to person, place, and time, anxious   Ears - TMs normal  Chest - clear to auscultation, no wheezes, rales or rhonchi, symmetric air entry  Heart - normal rate, regular rhythm, normal S1, S2, no murmurs, rubs, clicks or gallops    Assessment/Plan:    1.  Tachycardia  Symptoms improved  - atenolol (TENORMIN) 50 mg tablet; TAKE ONE TABLET BY MOUTH DAILY  Dispense: 90 Tab; Refill: 3    2. Post concussion syndrome  Betablocker and brain exercises    3. Anxiety  Continue   - hydrOXYzine HCl (ATARAX) 25 mg tablet; Take 1 Tab by mouth three (3) times daily as needed for Anxiety. Dispense: 90 Tab; Refill: 11  - sertraline (ZOLOFT) 100 mg tablet; Take 1 Tab by mouth daily. Dispense: 30 Tab; Refill: 11    4. Neuropathy    - gabapentin (NEURONTIN) 300 mg capsule; Take 2 Caps by mouth three (3) times daily. Dispense: 180 Cap; Refill: 3    5. Bipolar disorder, current episode depressed, severe, with psychotic features [facility])  Needs psychiatry evaluation    6. Muscle spasm    - baclofen (LIORESAL) 20 mg tablet; TAKE ONE TABLET BY MOUTH 3 TIMES A DAY  Dispense: 90 Tab; Refill: 11    7. Schizoaffective disorder, depressive type (HCC)    - sertraline (ZOLOFT) 100 mg tablet; Take 1 Tab by mouth daily. Dispense: 30 Tab; Refill: 11      Lab review: no lab studies available for review at time of visit      I have discussed the diagnosis with the patient and the intended plan as seen in the above orders. The patient has received an after-visit summary and questions were answered concerning future plans. I have discussed medication side effects and warnings with the patient as well. I have reviewed the plan of care with the patient, accepted their input and they are in agreement with the treatment goals.

## 2019-11-04 ENCOUNTER — OFFICE VISIT (OUTPATIENT)
Dept: FAMILY MEDICINE CLINIC | Age: 34
End: 2019-11-04

## 2019-11-04 VITALS
SYSTOLIC BLOOD PRESSURE: 105 MMHG | DIASTOLIC BLOOD PRESSURE: 62 MMHG | HEIGHT: 62 IN | BODY MASS INDEX: 30.8 KG/M2 | RESPIRATION RATE: 16 BRPM | OXYGEN SATURATION: 97 % | HEART RATE: 87 BPM | TEMPERATURE: 99.8 F | WEIGHT: 167.4 LBS

## 2019-11-04 DIAGNOSIS — R51.9 FRONTAL HEADACHE: ICD-10-CM

## 2019-11-04 DIAGNOSIS — F07.81 POST CONCUSSION SYNDROME: Primary | ICD-10-CM

## 2019-11-04 DIAGNOSIS — J41.1 MUCOPURULENT CHRONIC BRONCHITIS (HCC): ICD-10-CM

## 2019-11-04 DIAGNOSIS — G62.9 NEUROPATHY: ICD-10-CM

## 2019-11-04 DIAGNOSIS — F31.5 BIPOLAR DISORDER, CURRENT EPISODE DEPRESSED, SEVERE, WITH PSYCHOTIC FEATURES (HCC): ICD-10-CM

## 2019-11-04 NOTE — PROGRESS NOTES
Carlota Romero is a 26 y.o.  female and presents with    Chief Complaint   Patient presents with    Migraine     x one month       Subjective:  Ms Frances Gupta presents c/o frontal headache which has been present for 1 month; she denies fall; she has h/o concussion 10 months ago. This was the 13th concussion over the past decade. She has nausea and vomiting associated with the pain. She is taking tylenol and zofran. She is here for f/u anxiety and anger and fear . She has tried to get in with psychiatry and has been turned down by 5 offices; she is waiting for [facility]. she c/o post concussive syndrome.  She has been depressed over her anger. Blu Ravi has been taking quetiapine 900 mg at bedtime. She is taking buspirone for anxiety.  She has not established care with a psychiatrist.       Asthma  Current control: Fair   Current level: moderate persistent  Current symptoms: cough  Current controller: none  Last flareup: 2 weeks ago.   Number of flareups in past year:3-4  Current symptom relief med: Proventil     ROS   General ROS: positive for  - fatigue  negative for - chills or fever  Psychological ROS: positive for - anxiety  Ophthalmic ROS: positive for - blurry vision  ENT ROS: positive for - headaches; she c/o left ear fullness; she denies pain  Endocrine ROS: negative for - temperature intolerance  Respiratory ROS: no cough, shortness of breath, or wheezing  Cardiovascular ROS: no chest pain or dyspnea on exertion  Gastrointestinal ROS: positive for - nausea; no ongoing bowel incontinence  Genito-Urinary ROS: no dysuria, trouble voiding, or hematuria  Dermatological ROS: no rashes or lesions  All other systems reviewed and are negative.     Objective:  Vitals:    11/04/19 1056   BP: 105/62   Pulse: 87   Resp: 16   Temp: 99.8 °F (37.7 °C)   TempSrc: Oral   SpO2: 97%   Weight: 167 lb 6.4 oz (75.9 kg)   Height: 5' 2\" (1.575 m)   PainSc:   6   PainLoc: Head       alert, well appearing, and in no distress, oriented to person, place, and time and obese  Mental status - normal mood, behavior, speech, dress, motor activity, and thought processes  Eyes - pupils equal and reactive, extraocular eye movements intact  Chest - clear to auscultation, no wheezes, rales or rhonchi, symmetric air entry  Heart - normal rate, regular rhythm, normal S1, S2, no murmurs, rubs, clicks or gallops  Neurological - cranial nerves II through XII intact, normal muscle tone, no tremors, strength 5/5, normal gait and station    Assessment/Plan:    1. Post concussion syndrome  Daily headache new onset with h/o concussion   - MRI BRAIN W WO CONT; Future    2. Frontal headache  Pain management and imaging  - MRI BRAIN W WO CONT; Future    3. Neuropathy  Continue treatment    4. Bipolar disorder, current episode depressed, severe, with psychotic features ([facility] [address]  F/u with psychiatry    5. Mucopurulent chronic bronchitis ([facility] [address]  Continue inhaled therapy      Lab review: no lab studies available for review at time of visit      I have discussed the diagnosis with the patient and the intended plan as seen in the above orders. The patient has received an after-visit summary and questions were answered concerning future plans. I have discussed medication side effects and warnings with the patient as well. I have reviewed the plan of care with the patient, accepted their input and they are in agreement with the treatment goals.

## 2019-11-04 NOTE — PROGRESS NOTES
Urvashi Brice is a 26 y.o female that is present in the office for a routine appointment for migraines x 1 month. Patient request MRI for constant headaches. 1. Have you been to the ER, urgent care clinic since your last visit? Hospitalized since your last visit? No    2. Have you seen or consulted any other health care providers outside of the [address] [place] [address] since your last visit? Include any pap smears or colon screening.  No    Health Maintenance Due   Topic Date Due    DTaP/Tdap/Td series (1 - Tdap) 11/08/2004    PAP AKA CERVICAL CYTOLOGY  11/08/2004    Influenza Age 9 to Adult  08/01/2019

## 2019-11-06 DIAGNOSIS — J41.1 MUCOPURULENT CHRONIC BRONCHITIS (HCC): ICD-10-CM

## 2019-11-06 RX ORDER — ALBUTEROL SULFATE 90 UG/1
AEROSOL, METERED RESPIRATORY (INHALATION)
Qty: 18 G | Refills: 0 | Status: SHIPPED | OUTPATIENT
Start: 2019-11-06 | End: 2019-12-30

## 2019-11-15 ENCOUNTER — HOSPITAL ENCOUNTER (OUTPATIENT)
Dept: MRI IMAGING | Age: 34
Discharge: HOME | End: 2019-11-15
Attending: FAMILY MEDICINE
Payer: MEDICAID

## 2019-11-15 VITALS — WEIGHT: 165 LBS | BODY MASS INDEX: 30.18 KG/M2

## 2019-11-15 DIAGNOSIS — F07.81 POST CONCUSSION SYNDROME: ICD-10-CM

## 2019-11-15 DIAGNOSIS — R51.9 FRONTAL HEADACHE: ICD-10-CM

## 2019-11-15 PROCEDURE — A9575 INJ GADOTERATE MEGLUMI 0.1ML: HCPCS | Performed by: FAMILY MEDICINE

## 2019-11-15 PROCEDURE — 74011636320 HC RX REV CODE- 636/320: Performed by: FAMILY MEDICINE

## 2019-11-15 PROCEDURE — 70553 MRI BRAIN STEM W/O & W/DYE: CPT

## 2019-11-15 RX ADMIN — GADOTERATE MEGLUMINE 15 ML: 376.9 INJECTION INTRAVENOUS at 07:37

## 2019-11-20 RX ORDER — ONDANSETRON 4 MG/1
TABLET, ORALLY DISINTEGRATING ORAL
Qty: 30 TAB | Refills: 0 | Status: SHIPPED | OUTPATIENT
Start: 2019-11-20

## 2019-11-25 ENCOUNTER — OFFICE VISIT (OUTPATIENT)
Dept: FAMILY MEDICINE CLINIC | Age: 34
End: 2019-11-25

## 2019-11-25 VITALS
WEIGHT: 167.8 LBS | SYSTOLIC BLOOD PRESSURE: 108 MMHG | RESPIRATION RATE: 17 BRPM | BODY MASS INDEX: 30.88 KG/M2 | HEART RATE: 86 BPM | HEIGHT: 62 IN | TEMPERATURE: 99.1 F | OXYGEN SATURATION: 97 % | DIASTOLIC BLOOD PRESSURE: 70 MMHG

## 2019-11-25 DIAGNOSIS — J41.1 MUCOPURULENT CHRONIC BRONCHITIS (HCC): ICD-10-CM

## 2019-11-25 DIAGNOSIS — J01.00 SUBACUTE MAXILLARY SINUSITIS: Primary | ICD-10-CM

## 2019-11-25 DIAGNOSIS — F07.81 POST CONCUSSION SYNDROME: ICD-10-CM

## 2019-11-25 DIAGNOSIS — R51.9 FRONTAL HEADACHE: ICD-10-CM

## 2019-11-25 DIAGNOSIS — F31.5 BIPOLAR DISORDER, CURRENT EPISODE DEPRESSED, SEVERE, WITH PSYCHOTIC FEATURES (HCC): ICD-10-CM

## 2019-11-25 DIAGNOSIS — G62.9 NEUROPATHY: ICD-10-CM

## 2019-11-25 RX ORDER — IBUPROFEN 200 MG
800 TABLET ORAL
Qty: 20 TAB | Refills: 0 | Status: SHIPPED | OUTPATIENT
Start: 2019-11-25 | End: 2019-12-18 | Stop reason: ALTCHOICE

## 2019-11-25 RX ORDER — CLINDAMYCIN HYDROCHLORIDE 300 MG/1
300 CAPSULE ORAL 3 TIMES DAILY
Qty: 30 CAP | Refills: 0 | Status: SHIPPED | OUTPATIENT
Start: 2019-11-25 | End: 2019-12-05

## 2019-11-25 RX ORDER — ACETAMINOPHEN AND CODEINE PHOSPHATE 300; 30 MG/1; MG/1
1 TABLET ORAL
Qty: 20 TAB | Refills: 0 | Status: SHIPPED | OUTPATIENT
Start: 2019-11-25 | End: 2019-12-09

## 2019-11-25 NOTE — PROGRESS NOTES
Glen Albert is a 37 y.o female that is present in the office for a routine appointment for medication evaluation and daily headaches. Patient request MRI results. 1. Have you been to the ER, urgent care clinic since your last visit? Hospitalized since your last visit? No    2. Have you seen or consulted any other health care providers outside of the [address] [place] [address] since your last visit? Include any pap smears or colon screening.  No    Health Maintenance Due   Topic Date Due    DTaP/Tdap/Td series (1 - Tdap) 11/08/1994    PAP AKA CERVICAL CYTOLOGY  11/08/2004    Influenza Age 9 to Adult  08/01/2019

## 2019-11-25 NOTE — PROGRESS NOTES
Byron Almeida is a 37 y.o.  female and presents with    Chief Complaint   Patient presents with    Medication Evaluation    Migraine     Subjective:  Ms Giselle Orellana presents c/o frontal headache which has been present for 2 months with improvement today; she underwent MRI and is here for results; she denies fall; she has h/o concussion 11 months ago. This was the 13th concussion over the past decade. She has nausea and vomiting associated with the pain. She is taking tylenol and zofran. She is here for f/u anxiety and anger and fear . She has tried to get in with psychiatry and is waiting for [facility]. she c/o post concussive syndrome.  She has been depressed over her anger. Kennedi Garcia has been taking quetiapine 900 mg at bedtime. She is taking buspirone for anxiety.  She has not established care with a psychiatrist.       Asthma  Current control: Fair   Current level: moderate persistent  Current symptoms: cough  Current controller: none  Last flareup: 2 weeks ago. Number of flareups in past year:3-4  Current symptom relief med: Proventil     ROS   General ROS: positive for  - fatigue  negative for - chills or fever  Psychological ROS: positive for - anxiety  Ophthalmic ROS: positive for - blurry vision  ENT ROS: positive for - headaches; she c/o left ear fullness; she denies pain  Endocrine ROS: negative for - temperature intolerance  Respiratory ROS: no cough, shortness of breath, or wheezing  Cardiovascular ROS: no chest pain or dyspnea on exertion  Gastrointestinal ROS: positive for - nausea; no ongoing bowel incontinence  Genito-Urinary ROS: no dysuria, trouble voiding, or hematuria  Dermatological ROS: no rashes or lesions    All other systems reviewed and are negative.       Objective:  Vitals:    11/25/19 0835   BP: 108/70   Pulse: 86   Resp: 17   Temp: 99.1 °F (37.3 °C)   TempSrc: Oral   SpO2: 97%   Weight: 167 lb 12.8 oz (76.1 kg)   Height: 5' 2\" (1.575 m)   PainSc:   0 - No pain     alert, well appearing, and in no distress, oriented to person, place, and time and obese  Mental status - normal mood, behavior, speech, dress, motor activity, and thought processes  Eyes - pupils equal and reactive, extraocular eye movements intact  Nose - maxillary sinus ttp  Chest - clear to auscultation, no wheezes, rales or rhonchi, symmetric air entry  Heart - normal rate, regular rhythm, normal S1, S2, no murmurs, rubs, clicks or gallops  Neurological - cranial nerves II through XII intact, normal muscle tone, no tremors, strength 5/5, normal gait and station    TESTS  MRI brain  IMPRESSION:     1. No evidence of acute intracranial finding.     2. Chiari one malformation with effacement foramen magnum and mild flattening  cervical medullary junction.     3. Minimal white matter signal changes very nonspecific, commonly seen in  patients of all ages.     4. Small fluid levels left maxillary and sphenoid sinus, possible acute  Sinusitis. Assessment/Plan:    1. Subacute maxillary sinusitis  Start abx and use saline rinse of sinuses  - clindamycin (CLEOCIN) 300 mg capsule; Take 1 Cap by mouth three (3) times daily for 10 days. Dispense: 30 Cap; Refill: 0    2. Mucopurulent chronic bronchitis ([address]  Continue inhaled therapy    3. Post concussion syndrome  Pain management  - acetaminophen-codeine (TYLENOL #3) 300-30 mg per tablet; Take 1 Tab by mouth every four (4) hours as needed for Pain for up to 14 days. Max Daily Amount: 6 Tabs. Dispense: 20 Tab; Refill: 0    4. Frontal headache  Likely complicated by sinusitis  - ibuprofen (MOTRIN) 200 mg tablet; Take 4 Tabs by mouth every six (6) hours as needed for Pain. Dispense: 20 Tab; Refill: 0  - acetaminophen-codeine (TYLENOL #3) 300-30 mg per tablet; Take 1 Tab by mouth every four (4) hours as needed for Pain for up to 14 days. Max Daily Amount: 6 Tabs. Dispense: 20 Tab; Refill: 0    5. Neuropathy      6.  Bipolar disorder, current episode depressed, severe, with psychotic features [facility])  Psychology/psychiatry appointment pending    Lab review: no lab studies available for review at time of visit      I have discussed the diagnosis with the patient and the intended plan as seen in the above orders. The patient has received an after-visit summary and questions were answered concerning future plans. I have discussed medication side effects and warnings with the patient as well. I have reviewed the plan of care with the patient, accepted their input and they are in agreement with the treatment goals.

## 2019-12-11 ENCOUNTER — OFFICE VISIT (OUTPATIENT)
Dept: FAMILY MEDICINE CLINIC | Age: 34
End: 2019-12-11

## 2019-12-11 VITALS
SYSTOLIC BLOOD PRESSURE: 124 MMHG | DIASTOLIC BLOOD PRESSURE: 82 MMHG | HEIGHT: 62 IN | TEMPERATURE: 99.4 F | OXYGEN SATURATION: 95 % | RESPIRATION RATE: 16 BRPM | HEART RATE: 84 BPM | BODY MASS INDEX: 30.55 KG/M2 | WEIGHT: 166 LBS

## 2019-12-11 DIAGNOSIS — G62.9 NEUROPATHY: ICD-10-CM

## 2019-12-11 DIAGNOSIS — M94.0 COSTOCHONDRITIS: Primary | ICD-10-CM

## 2019-12-11 DIAGNOSIS — B00.9 HSV-1 (HERPES SIMPLEX VIRUS 1) INFECTION: ICD-10-CM

## 2019-12-11 DIAGNOSIS — R51.9 FRONTAL HEADACHE: ICD-10-CM

## 2019-12-11 RX ORDER — ACYCLOVIR 400 MG/1
400 TABLET ORAL
Qty: 40 TAB | Refills: 1 | Status: SHIPPED | OUTPATIENT
Start: 2019-12-11

## 2019-12-11 NOTE — PATIENT INSTRUCTIONS

## 2019-12-11 NOTE — PROGRESS NOTES
Rosalio Mendoza is a 37 y.o.  female and presents with    Chief Complaint   Patient presents with    Headache    Bipolar     Subjective:  Chest Wall Pain  Patient presents for f/u ER visit 6 days ago  for presents evaluation of chest wall pain which was diagnosed 8 months ago. . Pain description: character of chest pain: sharp   Mechanism of injury: none known. Previous visits for this problem: yes, last seen 6 days ago by emergency department at [facility]. Evaluation to date: EKG: a week ago: normal  CXR: a week ago: normal  Treatment to date: OTC analgesics: not very effective    She c/o frontal headache which has been present for 3 months with improvement today; she underwent MRI and is here for results; she denies fall; she has h/o concussion 12 months ago. Yunior Terry was the 13th concussion over the past decade. Sara Bravo has nausea and vomiting associated with the pain.  She is taking tylenol and zofran.    She is here for f/u anxiety and anger and fear . She has tried to get in with psychiatry and is waiting for [facility].    she c/o post concussive syndrome.  She has been depressed over her anger. Sara Bravo has been taking quetiapine 900 mg at bedtime. She is taking buspirone for anxiety.  She has not established care with a psychiatrist.       Asthma  Current control: Fair   Current level: moderate persistent  Current symptoms: cough  Current controller: none  Last flareup: 2 weeks ago.   Number of flareups in past year:3-4  Current symptom relief med: Proventil     ROS   General ROS: positive for  - fatigue  negative for - chills or fever  Psychological ROS: positive for - anxiety  Ophthalmic ROS: positive for - blurry vision  ENT ROS: positive for - headaches; she c/o left ear fullness; she denies pain  Endocrine ROS: negative for - temperature intolerance  Respiratory ROS: no cough, shortness of breath, or wheezing  Cardiovascular ROS: no chest pain or dyspnea on exertion  Gastrointestinal ROS: positive for - nausea; no ongoing bowel incontinence  Genito-Urinary ROS: no dysuria, trouble voiding, or hematuria  Dermatological ROS: no rashes or lesions  All other systems reviewed and are negative. Objective:  Vitals:    12/11/19 1128   BP: 124/82   Pulse: 84   Resp: 16   Temp: 99.4 °F (37.4 °C)   TempSrc: Oral   SpO2: 95%   Weight: 166 lb (75.3 kg)   Height: 5' 2\" (1.575 m)   PainSc:   5   PainLoc: Generalized       alert, well appearing, and in no distress, oriented to person, place, and time and obese  Mental status - normal mood, behavior, speech, dress, motor activity, and thought processes  Eyes - pupils equal and reactive, extraocular eye movements intact  Nose - maxillary sinus ttp  Chest - + chest wall ttp; clear to auscultation, no wheezes, rales or rhonchi, symmetric air entry  Heart - normal rate, regular rhythm, normal S1, S2, no murmurs, rubs, clicks or gallops  Neurological - cranial nerves II through XII intact, normal muscle tone, no tremors, strength 5/5, normal gait and station    TESTS  ekg nsr    Assessment/Plan:    1. HSV-1 (herpes simplex virus 1) infection    - acyclovir (ZOVIRAX) 400 mg tablet; Take 1 Tab by mouth every four (4) hours (while awake). Dispense: 40 Tab; Refill: 1    2. Costochondritis  nsaid    3. Frontal headache  Pain management; hydrate    4. Neuropathy  Pain management      Lab review: labs reviewed, I note that comprehensive metabolic panel normal      I have discussed the diagnosis with the patient and the intended plan as seen in the above orders. The patient has received an after-visit summary and questions were answered concerning future plans. I have discussed medication side effects and warnings with the patient as well. I have reviewed the plan of care with the patient, accepted their input and they are in agreement with the treatment goals.

## 2019-12-11 NOTE — PROGRESS NOTES
1. Have you been to the ER, urgent care clinic since your last visit? Hospitalized since your last visit? No    2. Have you seen or consulted any other health care providers outside of the [address] [place] [address] since your last visit? Include any pap smears or colon screening. No     Patient presents in office today for routine care.   Patient concerns: requesting referral to physical therapy and a refill for acyclovir

## 2019-12-16 ENCOUNTER — TELEPHONE (OUTPATIENT)
Dept: FAMILY MEDICINE CLINIC | Age: 34
End: 2019-12-16

## 2019-12-16 NOTE — TELEPHONE ENCOUNTER
Pt called in and informed me that when she was here she had discussed with Dr. Kamini Lazaro about being prescribed a different anti-inflammatory and she had at [facility] the motrin was fine but she is now wanting to know if Dr. Kamini Lazaro would please prescribe her that different prescription. Please advise.

## 2019-12-18 DIAGNOSIS — R51.9 FRONTAL HEADACHE: Primary | ICD-10-CM

## 2019-12-18 DIAGNOSIS — M94.0 COSTOCHONDRITIS: ICD-10-CM

## 2019-12-18 RX ORDER — MELOXICAM 7.5 MG/1
7.5 TABLET ORAL DAILY
Qty: 30 TAB | Refills: 1 | Status: SHIPPED | OUTPATIENT
Start: 2019-12-18

## 2019-12-30 DIAGNOSIS — J41.1 MUCOPURULENT CHRONIC BRONCHITIS (HCC): ICD-10-CM

## 2019-12-30 RX ORDER — ALBUTEROL SULFATE 90 UG/1
AEROSOL, METERED RESPIRATORY (INHALATION)
Qty: 18 G | Refills: 0 | Status: SHIPPED | OUTPATIENT
Start: 2019-12-30

## 2020-02-03 DIAGNOSIS — G62.9 NEUROPATHY: ICD-10-CM

## 2020-02-03 NOTE — TELEPHONE ENCOUNTER
Pharmacy and the patient called in requesting a refill but pt wants to know if she needs to make an appointment or if she can just  the medication. Please advise.

## 2020-02-04 RX ORDER — GABAPENTIN 300 MG/1
CAPSULE ORAL
Qty: 180 CAP | Refills: 1 | Status: SHIPPED | OUTPATIENT
Start: 2020-02-04

## 2020-02-19 ENCOUNTER — OFFICE VISIT (OUTPATIENT)
Dept: FAMILY MEDICINE CLINIC | Age: 35
End: 2020-02-19

## 2020-02-19 ENCOUNTER — HOSPITAL ENCOUNTER (OUTPATIENT)
Dept: LAB | Age: 35
Discharge: HOME | End: 2020-02-19
Payer: COMMERCIAL

## 2020-02-19 VITALS
BODY MASS INDEX: 30.07 KG/M2 | HEIGHT: 62 IN | TEMPERATURE: 98.6 F | RESPIRATION RATE: 18 BRPM | HEART RATE: 89 BPM | DIASTOLIC BLOOD PRESSURE: 70 MMHG | WEIGHT: 163.4 LBS | SYSTOLIC BLOOD PRESSURE: 120 MMHG | OXYGEN SATURATION: 95 %

## 2020-02-19 DIAGNOSIS — R23.2 HOT FLASHES: ICD-10-CM

## 2020-02-19 DIAGNOSIS — F25.1 SCHIZOAFFECTIVE DISORDER, DEPRESSIVE TYPE (HCC): ICD-10-CM

## 2020-02-19 DIAGNOSIS — L65.9 HAIR LOSS: ICD-10-CM

## 2020-02-19 DIAGNOSIS — F41.9 ANXIETY: ICD-10-CM

## 2020-02-19 DIAGNOSIS — G62.9 NEUROPATHY: Primary | ICD-10-CM

## 2020-02-19 DIAGNOSIS — R51.9 FRONTAL HEADACHE: ICD-10-CM

## 2020-02-19 LAB
FSH SERPL-ACNC: 10 MIU/ML
LH SERPL-ACNC: 6.1 MIU/ML
TSH SERPL DL<=0.05 MIU/L-ACNC: 1.48 UIU/ML (ref 0.36–3.74)

## 2020-02-19 PROCEDURE — 83001 ASSAY OF GONADOTROPIN (FSH): CPT

## 2020-02-19 PROCEDURE — 84443 ASSAY THYROID STIM HORMONE: CPT

## 2020-02-19 PROCEDURE — 36415 COLL VENOUS BLD VENIPUNCTURE: CPT

## 2020-02-19 RX ORDER — SERTRALINE HYDROCHLORIDE 100 MG/1
150 TABLET, FILM COATED ORAL DAILY
Qty: 45 TAB | Refills: 11 | Status: SHIPPED | OUTPATIENT
Start: 2020-02-19

## 2020-02-19 RX ORDER — HYDROXYZINE 50 MG/1
50 TABLET, FILM COATED ORAL
Qty: 90 TAB | Refills: 3 | Status: SHIPPED | OUTPATIENT
Start: 2020-02-19

## 2020-02-19 NOTE — PROGRESS NOTES
1. Have you been to the ER, urgent care clinic since your last visit? Hospitalized since your last visit? No    2. Have you seen or consulted any other health care providers outside of the [address] [place] [address] since your last visit? Include any pap smears or colon screening. No     Patient presents with concerns of having frequent night sweats, losing her hair and requesting to obtain a medication dosage increase on certain meds.

## 2020-02-19 NOTE — PROGRESS NOTES
Daniel Hernandez is a 37 y.o.  female and presents with    Chief Complaint   Patient presents with    Hair/Scalp Problem     losing hair    Sweats    Medication Evaluation     Subjective:  Ms Bentley Bryant presents with a friend complaining of increased anxiety described as periods of elevated \"adrenaline and anxiety\" where the patient experiences shaking and increased heart rate for periods lasting up to 4 hours. She reports these episodes beginning as early as 6 months ago, and are triggered when her fiance does not call her at least once every 2 hours prompting her to worry. Patient also describes anxiety from 2 weeks of hair loss and night sweats, which she attributes to early onset menopause, patient underwent partial hysterectomy in 2012. She reports attempting to establish care with 6 different psychiatrists w/ no success and is currently attempting to establish care with another, she is requesting and increase in Zoloft, Buspirone, and Atarax as she has no established care with a psychiatrist.      Additional Concerns: Patient endorses auditory hallucinations, mainly at night where she hears her door being opened and a unknown individual walking loudly inside, patient locks and chains door shut nightly.  Patient also describes possible tactile hallucinations, described as warm sensations in her throat and her that occur at night and feel similar to what she experienced when using amphetamines in the past. Patient reports no VH, SI, HI, or dissociative periods.      ROS   General ROS: positive for chills (experienced during bouts of anxiety)  negative for fever, fatigue  Psychological ROS: positive for - anxiety, AH, negative for depression, SI, VH  Ophthalmic ROS: no changes in vision  ENT ROS: reports periods of warmness inside ear at night  Endocrine ROS: negative for - temperature intolerance  Respiratory ROS: no cough, shortness of breath, or wheezing  Cardiovascular ROS: no chest pain or dyspnea on exertion  Gastrointestinal ROS: positive for - nausea, occurring in the morning, positive for -diarrhea  Genito-Urinary ROS: no dysuria, trouble voiding, or hematuria  Dermatological ROS: no rashes or lesions  All other systems reviewed and are negative. Objective:  Vitals:    02/19/20 0831   BP: 120/70   Pulse: 89   Resp: 18   Temp: 98.6 °F (37 °C)   TempSrc: Oral   SpO2: 95%   Weight: 163 lb 6.4 oz (74.1 kg)   Height: 5' 2\" (1.575 m)   PainSc:   0 - No pain        alert, well appearing, and in no distress, oriented to person, place, and time and obese  Mental status - normal mood, behavior, speech, dress, motor activity, and thought processes  Eyes - pupils equal and reactive, extraocular eye movements intact  Chest - clear to auscultation, no wheezes, rales or rhonchi, symmetric air entry  Heart - normal rate, regular rhythm, normal S1, S2, no murmurs, rubs, clicks or gallops  Neurological - cranial nerves II through XII intact, normal muscle tone, no tremors, strength 5/5, normal gait and station    LABS   1.48    Assessment/Plan:    1. Anxiety  Increase SSRI and anxiolytic  - sertraline (ZOLOFT) 100 mg tablet; Take 1.5 Tabs by mouth daily. Dispense: 45 Tab; Refill: 11  - hydroxyzine HCL (ATARAX) 50 mg tablet; Take 1 Tab by mouth three (3) times daily as needed for Anxiety. Dispense: 90 Tab; Refill: 3    2. Schizoaffective disorder, depressive type (HCC)  Increase zoloft dose  - sertraline (ZOLOFT) 100 mg tablet; Take 1.5 Tabs by mouth daily. Dispense: 45 Tab; Refill: 11    3. Neuropathy  Continue gabapentin    4. Frontal headache  Continue meloxicam    5. Hair loss  Assess for etiology; ddx hyperthyroid  - FSH AND LH; Future  - TSH 3RD GENERATION; Future    6. Hot flashes  Assess for endocrine etiology  - FSH AND LH; Future  - TSH 3RD GENERATION;  Future      Lab review: labs reviewed, I note that TSH normal, orders written for new lab studies as appropriate; see orders      I have discussed the diagnosis with the patient and the intended plan as seen in the above orders. The patient has received an after-visit summary and questions were answered concerning future plans. I have discussed medication side effects and warnings with the patient as well. I have reviewed the plan of care with the patient, accepted their input and they are in agreement with the treatment goals.

## 2020-02-19 NOTE — PROGRESS NOTES
*ATTENTION:  This note has been created by a medical student for educational purposes only. Please do not refer to the content of this note for clinical decision-making, billing, or other purposes. Please see attending physicians note to obtain clinical information on this patient. *     Tavares Marrero is a 37 y. o.female with h/o schizoaffective disorder, Bipolar II, and substance abuse (28 months sober) who presents for a medication evaluation (Zoloft, Buspirone) and hair loss. Chief Complaint   Patient presents with    Hair/Scalp Problem     losing hair    Sweats    Medication Evaluation           Subjective:    Patient describes increased anxiety describes as periods of elevated \"adrenaline and anxiety\" where the patient experiences shaking and increased heart rate for periods lasting up to 4 hours. She reports these episodes beginning as early as 6 months ago, and are triggered when her fiance does not call her at least once every 2 hours prompting her to worry. Patient also describes anxiety from 2 weeks of hair loss and night sweats, which she attributes to early onset menopause, patient underwent partial hysterectomy in 2012. She reports attempting to establish care with 6 different psychiatrists w/ no success and is currently attempting to establish care with another, she is requesting and increase in Zoloft, Buspirone, and Atarax as she has no established care with a psychiatrist.     Additional Concerns: Patient endorses auditory hallucinations, mainly at night where she hears her door being opened and a unknown individual walking loudly inside, patient locks and chains door shut nightly. Patient also describes possible tactile hallucinations, described as warm sensations in her throat and her that occur at night and feel similar to what she experienced when using amphetamines in the past. Patient reports no VH, SI, HI, or dissociative periods.      ROS   General ROS: positive for chills (experienced during bouts of anxiety)  negative for fever, fatigue  Psychological ROS: positive for - anxiety, AH, negative for depression, SI, VH  Ophthalmic ROS: no changes in vision  ENT ROS: reports periods of warmness inside ear at night  Endocrine ROS: negative for - temperature intolerance  Respiratory ROS: no cough, shortness of breath, or wheezing  Cardiovascular ROS: no chest pain or dyspnea on exertion  Gastrointestinal ROS: positive for - nausea, occurring in the morning, positive for -diarrhea  Genito-Urinary ROS: no dysuria, trouble voiding, or hematuria  Dermatological ROS: no rashes or lesions  All other systems reviewed and are negative. All other systems reviewed and are negative. Objective:  Vitals:    02/19/20 0831   BP: 120/70   Pulse: 89   Resp: 18   Temp: 98.6 °F (37 °C)   TempSrc: Oral   SpO2: 95%   Weight: 163 lb 6.4 oz (74.1 kg)   Height: 5' 2\" (1.575 m)   PainSc:   0 - No pain       alert, well appearing, anxious, in no acute distress  Visit Vitals  /70 (BP 1 Location: Left arm, BP Patient Position: Sitting)   Pulse 89   Temp 98.6 °F (37 °C) (Oral)   Resp 18   Ht 5' 2\" (1.575 m)   Wt 163 lb 6.4 oz (74.1 kg)   SpO2 95%   BMI 29.89 kg/m²     LABS     TESTS      Assessment/Plan    Demi Tatum is a 37 y. o.female with h/o schizoaffective disorder, Bipolar II, and substance abuse (28 months sober) who presents for a medication evaluation (Zoloft, Buspirone) and hair loss. HPI notable for increased anxiety with reported 4 hour bouts of increased heart rate and increased heart rate as well as AH and reported 2 weeks of hair loss. 1. HSV-1 Infection  - continue acyclovir    2. Anxiety  - increase buspirone to 15 mg TID from BID  - increase zoloft to 150 mg qD  - increasea atarax    3. Schizoaffective Disorder  - continue seroquel  - continue counseling importance of establishing psychiatric care    4.  Nausea  - reports well controlled with Zofran prn    5. 2 weeks hairloss  - Assess TSH  - Assess FSH/LH        I have discussed the diagnosis with the patient and the intended plan as seen in the above orders. The patient has received an after-visit summary and questions were answered concerning future plans. I have discussed medication side effects and warnings with the patient as well. I have reviewed the plan of care with the patient, accepted their input and they are in agreement with the treatment goals. Follow-up and Dispositions    · Return in about 1 month (around 3/19/2020) for medication evaluation.        Nic Wilson MS3
